# Patient Record
Sex: FEMALE | Race: WHITE | NOT HISPANIC OR LATINO | Employment: OTHER | URBAN - METROPOLITAN AREA
[De-identification: names, ages, dates, MRNs, and addresses within clinical notes are randomized per-mention and may not be internally consistent; named-entity substitution may affect disease eponyms.]

---

## 2017-01-04 ENCOUNTER — OFFICE VISIT (OUTPATIENT)
Dept: MEDICAL GROUP | Facility: MEDICAL CENTER | Age: 77
End: 2017-01-04
Payer: MEDICARE

## 2017-01-04 VITALS
TEMPERATURE: 98.7 F | WEIGHT: 147 LBS | HEIGHT: 60 IN | SYSTOLIC BLOOD PRESSURE: 126 MMHG | DIASTOLIC BLOOD PRESSURE: 80 MMHG | OXYGEN SATURATION: 93 % | RESPIRATION RATE: 16 BRPM | BODY MASS INDEX: 28.86 KG/M2 | HEART RATE: 91 BPM

## 2017-01-04 DIAGNOSIS — E03.9 ACQUIRED HYPOTHYROIDISM: ICD-10-CM

## 2017-01-04 DIAGNOSIS — R73.02 GLUCOSE INTOLERANCE (IMPAIRED GLUCOSE TOLERANCE): ICD-10-CM

## 2017-01-04 DIAGNOSIS — I10 ESSENTIAL HYPERTENSION: ICD-10-CM

## 2017-01-04 DIAGNOSIS — M25.562 CHRONIC PAIN OF LEFT KNEE: ICD-10-CM

## 2017-01-04 DIAGNOSIS — G89.29 CHRONIC PAIN OF LEFT KNEE: ICD-10-CM

## 2017-01-04 PROCEDURE — 99214 OFFICE O/P EST MOD 30 MIN: CPT | Performed by: INTERNAL MEDICINE

## 2017-01-04 RX ORDER — VERAPAMIL HYDROCHLORIDE 240 MG/1
TABLET, FILM COATED, EXTENDED RELEASE ORAL
COMMUNITY
Start: 2016-10-19 | End: 2017-09-08

## 2017-01-04 ASSESSMENT — PATIENT HEALTH QUESTIONNAIRE - PHQ9: CLINICAL INTERPRETATION OF PHQ2 SCORE: 0

## 2017-01-04 NOTE — MR AVS SNAPSHOT
Jill Diaz   2017 11:20 AM   Office Visit   MRN: [id number]    Department:  [address] [place] [address]   Dept Phone:  [phone]    Description:  Female : 1940   Provider:  Hector Neville M.D.           Reason for Visit     Follow-Up Labs      Allergies as of 2017     Allergen Noted Reactions    Demerol 2011   Vomiting    Nsaids 2011       swelling      You were diagnosed with     Acquired hypothyroidism   [3297580]       Essential hypertension   [1056571]       Glucose intolerance (impaired glucose tolerance)   [836778]       Chronic pain of left knee   [231126]         Vital Signs     Blood Pressure Pulse Temperature Respirations Height Weight    126/80 mmHg 91 37.1 °C (98.7 °F) 16 1.524 m (5') 66.679 kg (147 lb)    Body Mass Index Oxygen Saturation Smoking Status             28.71 kg/m2 93% Former Smoker         Basic Information     Date Of Birth Sex Race Ethnicity Preferred Language    1940 Female White Non- English      Your appointments     2017 11:00 AM   Established Patient with Hector Neville M.D.   [facility] [address]    [facility] [address]  [facility] [address] [phone]           You will be receiving a confirmation call a few days before your appointment from our automated call confirmation system.              Problem List              ICD-10-CM Priority Class Noted - Resolved    Essential hypertension I10 Medium  2011 - Present    Dyslipidemia E78.5 Medium  2011 - Present    Acquired hypothyroidism E03.9 Medium Chronic 2014 - Present    Glucose intolerance (impaired glucose tolerance) R73.02 Medium Chronic 2014 - Present    Chronic low back pain M54.5, G89.29 Medium Chronic 2014 - Present    Arrhythmia I49.9 High  10/16/2014 - Present    Chronic pain of left knee M25.562, G89.29   2016 - Present    Skin neoplasm D49.2   2016 - Present    Gastroesophageal reflux disease without  esophagitis K21.9   8/29/2016 - Present      Health Maintenance        Date Due Completion Dates    IMM DTaP/Tdap/Td Vaccine (1 - Tdap) 8/20/1959 ---    IMM ZOSTER VACCINE 8/20/2000 ---    MAMMOGRAM 10/14/2017 10/14/2016, 10/3/2016, 3/1/2014 (Prv Comp), 3/28/2013, 3/22/2012, 6/27/2011, 3/25/2011, 3/2/2011, 1/14/2009, 1/14/2009, 7/25/2007, 7/25/2007, 11/1/2005, 5/25/2004    Override on 3/1/2014: Previously completed    BONE DENSITY 10/17/2019 10/17/2014 (Prv Comp), 6/24/2011    Override on 10/17/2014: Previously completed    COLONOSCOPY 8/21/2023 8/21/2013            Current Immunizations     13-VALENT PCV PREVNAR 8/29/2015    Influenza TIV (IM) 9/1/2014    Influenza Vaccine Adult HD 9/4/2016, 10/6/2015 10:19 AM, 9/30/2014    Pneumococcal polysaccharide vaccine (PPSV-23) 1/10/2012      Below and/or attached are the medications your provider expects you to take. Review all of your home medications and newly ordered medications with your provider and/or pharmacist. Follow medication instructions as directed by your provider and/or pharmacist. Please keep your medication list with you and share with your provider. Update the information when medications are discontinued, doses are changed, or new medications (including over-the-counter products) are added; and carry medication information at all times in the event of emergency situations     Allergies:  DEMEROL - Vomiting     NSAIDS - (reactions not documented)               Medications  Valid as of: January 04, 2017 - 11:37 AM    Generic Name Brand Name Tablet Size Instructions for use    Aspirin (Tablet Delayed Response) ECOTRIN 81 MG Take 81 mg by mouth every day.        Cholecalciferol   Take 2,000 Units by mouth every day.        Diclofenac Sodium (Gel) Diclofenac Sodium 1 % Apply 1 Inch to skin as directed 4 times a day.        Furosemide (Tab) LASIX 20 MG TAKE ONE TABLET BY MOUTH ONCE DAILY        Levothyroxine Sodium (Tab) SYNTHROID 88 MCG Take 0.5 Tabs by mouth  Every morning on an empty stomach.        Losartan Potassium (Tab) COZAAR 25 MG TAKE ONE TABLET BY MOUTH ONCE DAILY IN THE MORNING        Metoprolol Succinate (TABLET SR 24 HR) TOPROL XL 50 MG TAKE ONE TABLET BY MOUTH ONCE DAILY        Omeprazole (CAPSULE DELAYED RELEASE) PRILOSEC 20 MG TAKE ONE CAPSULE BY MOUTH ONCE DAILY        Potassium Chloride (Tab CR) KLOR-CON 10 MEQ TAKE ONE TABLET BY MOUTH TWICE DAILY        Simvastatin (Tab) ZOCOR 40 MG TAKE ONE TABLET BY MOUTH ONCE DAILY        Verapamil HCl (CAPSULE SR 24 HR) Verapamil HCl  MG TAKE ONE CAPSULE BY MOUTH ONCE DAILY        Verapamil HCl (Tab CR) CALAN- MG         .                 Medicines prescribed today were sent to:     [facility] [address] [place] [address] (), [place] - [address]    [address] () [place] [address]    Phone: [phone] Fax: [phone]    Open 24 Hours?: No      Medication refill instructions:       If your prescription bottle indicates you have medication refills left, it is not necessary to call your provider’s office. Please contact your pharmacy and they will refill your medication.    If your prescription bottle indicates you do not have any refills left, you may request refills at any time through one of the following ways: The online [employer] system (except Urgent Care), by calling your provider’s office, or by asking your pharmacy to contact your provider’s office with a refill request. Medication refills are processed only during regular business hours and may not be available until the next business day. Your provider may request additional information or to have a follow-up visit with you prior to refilling your medication.   *Please Note: Medication refills are assigned a new Rx number when refilled electronically. Your pharmacy may indicate that no refills were authorized even though a new prescription for the same medication is available at the pharmacy. Please request the medicine by name with the  pharmacy before contacting your provider for a refill.        Your To Do List     Future Labs/Procedures Complete By Expires    HEMOGLOBIN A1C  As directed 1/5/2018         MyChart Access Code: Activation code not generated  Current MyChart Status: Active

## 2017-01-04 NOTE — PROGRESS NOTES
CC: Follow-up multiple issues    HPI:   Jill presents today with the following.    1. Acquired hypothyroidism  Thyroid recently checked found to be in a good range denying any hair or skin changes.    2. Essential hypertension  Blood pressures will maintain on current regimen denying any chest pain or shortness of breath no edema.    3. Glucose intolerance (impaired glucose tolerance)  Blood sugars again elevated fasting A1c of 5.7. Her  is diabetic so she's well aware of dietary measures. She is also started exercising since results obtained.    4. Chronic pain of left knee  Reports knee is doing significant better she did fill prescription from the pain specialist however she is not needing medication since her injection.       Patient Active Problem List    Diagnosis Date Noted   • Arrhythmia 10/16/2014     Priority: High   • Chronic low back pain 09/11/2014     Priority: Medium     Class: Chronic   • Acquired hypothyroidism 05/08/2014     Priority: Medium     Class: Chronic   • Glucose intolerance (impaired glucose tolerance) 05/08/2014     Priority: Medium     Class: Chronic   • Essential hypertension 07/05/2011     Priority: Medium   • Dyslipidemia 07/05/2011     Priority: Medium   • Gastroesophageal reflux disease without esophagitis 08/29/2016   • Skin neoplasm 06/27/2016   • Chronic pain of left knee 06/08/2016       Current Outpatient Prescriptions   Medication Sig Dispense Refill   • Diclofenac Sodium (VOLTAREN) 1 % Gel Apply 1 Inch to skin as directed 4 times a day. 5 Tube 3   • verapamil ER (CALAN-SR) 240 MG Tab CR      • potassium chloride ER (KLOR-CON) 10 MEQ tablet TAKE ONE TABLET BY MOUTH TWICE DAILY 60 Tab 6   • levothyroxine (SYNTHROID) 88 MCG Tab Take 0.5 Tabs by mouth Every morning on an empty stomach. 45 Tab 3   • Verapamil HCl  MG CAPSULE SR 24 HR TAKE ONE CAPSULE BY MOUTH ONCE DAILY 90 Cap 4   • omeprazole (PRILOSEC) 20 MG delayed-release capsule TAKE ONE CAPSULE BY MOUTH  ONCE DAILY 90 Cap 4   • simvastatin (ZOCOR) 40 MG Tab TAKE ONE TABLET BY MOUTH ONCE DAILY 90 Tab 3   • losartan (COZAAR) 25 MG Tab TAKE ONE TABLET BY MOUTH ONCE DAILY IN THE MORNING 90 Tab 3   • furosemide (LASIX) 20 MG Tab TAKE ONE TABLET BY MOUTH ONCE DAILY 90 Tab 4   • metoprolol SR (TOPROL XL) 50 MG TABLET SR 24 HR TAKE ONE TABLET BY MOUTH ONCE DAILY 90 Tab 3   • aspirin EC (ECOTRIN) 81 MG TBEC Take 81 mg by mouth every day.     • VITAMIN D, CHOLECALCIFEROL, PO Take 2,000 Units by mouth every day.       No current facility-administered medications for this visit.         Allergies as of 01/04/2017 - Martínez as Reviewed 01/04/2017   Allergen Reaction Noted   • Demerol Vomiting 11/14/2011   • Nsaids  07/05/2011        ROS: As per HPI.    /80 mmHg  Pulse 91  Temp(Src) 37.1 °C (98.7 °F)  Resp 16  Ht 1.524 m (5')  Wt 66.679 kg (147 lb)  BMI 28.71 kg/m2  SpO2 93%    Physical Exam:  Gen:         Alert and oriented, No apparent distress.  Neck:        No Lymphadenopathy or Bruits.  Lungs:     Clear to auscultation bilaterally  CV:          Regular rate and rhythm. No murmurs, rubs or gallops.               Ext:          No clubbing, cyanosis, edema.      Assessment and Plan.   76 y.o. female with the following issues.    1. Acquired hypothyroidism  Hypothyroidism.   Currently adequately replaced, recheck 6 months to one year.      2. Essential hypertension  Currently well controlled, Discuss diet, exercise and salt restriction.    3. Glucose intolerance (impaired glucose tolerance)  Discussed diet and exercise will check every 6 months.  - HEMOGLOBIN A1C; Future    4. Chronic pain of left knee  Placing on topical anti-inflammatory will see back if pain should return and worsen.  - Diclofenac Sodium (VOLTAREN) 1 % Gel; Apply 1 Inch to skin as directed 4 times a day.  Dispense: 5 Tube; Refill: 3

## 2017-03-12 ENCOUNTER — HOSPITAL ENCOUNTER (EMERGENCY)
Facility: MEDICAL CENTER | Age: 77
End: 2017-03-13
Attending: EMERGENCY MEDICINE
Payer: MEDICARE

## 2017-03-12 ENCOUNTER — APPOINTMENT (OUTPATIENT)
Dept: RADIOLOGY | Facility: MEDICAL CENTER | Age: 77
End: 2017-03-12
Attending: EMERGENCY MEDICINE
Payer: MEDICARE

## 2017-03-12 ENCOUNTER — APPOINTMENT (OUTPATIENT)
Dept: RADIOLOGY | Facility: MEDICAL CENTER | Age: 77
End: 2017-03-12
Payer: MEDICARE

## 2017-03-12 DIAGNOSIS — I95.89 OTHER SPECIFIED HYPOTENSION: ICD-10-CM

## 2017-03-12 DIAGNOSIS — R00.2 PALPITATIONS: ICD-10-CM

## 2017-03-12 LAB
ALBUMIN SERPL BCP-MCNC: 4.3 G/DL (ref 3.2–4.9)
ALBUMIN/GLOB SERPL: 1.5 G/DL
ALP SERPL-CCNC: 57 U/L (ref 30–99)
ALT SERPL-CCNC: 23 U/L (ref 2–50)
ANION GAP SERPL CALC-SCNC: 11 MMOL/L (ref 0–11.9)
APPEARANCE UR: ABNORMAL
APTT PPP: 29 SEC (ref 24.7–36)
AST SERPL-CCNC: 22 U/L (ref 12–45)
BACTERIA #/AREA URNS HPF: ABNORMAL /HPF
BASOPHILS # BLD AUTO: 0.1 % (ref 0–1.8)
BASOPHILS # BLD: 0.01 K/UL (ref 0–0.12)
BILIRUB SERPL-MCNC: 0.3 MG/DL (ref 0.1–1.5)
BILIRUB UR QL STRIP.AUTO: NEGATIVE
BNP SERPL-MCNC: 16 PG/ML (ref 0–100)
BUN SERPL-MCNC: 26 MG/DL (ref 8–22)
CALCIUM SERPL-MCNC: 9.4 MG/DL (ref 8.5–10.5)
CHLORIDE SERPL-SCNC: 105 MMOL/L (ref 96–112)
CO2 SERPL-SCNC: 24 MMOL/L (ref 20–33)
COLOR UR: ABNORMAL
CREAT SERPL-MCNC: 1.28 MG/DL (ref 0.5–1.4)
CULTURE IF INDICATED INDCX: YES UA CULTURE
EKG IMPRESSION: NORMAL
EOSINOPHIL # BLD AUTO: 0.07 K/UL (ref 0–0.51)
EOSINOPHIL NFR BLD: 1 % (ref 0–6.9)
EPI CELLS #/AREA URNS HPF: ABNORMAL /HPF
ERYTHROCYTE [DISTWIDTH] IN BLOOD BY AUTOMATED COUNT: 44.1 FL (ref 35.9–50)
GFR SERPL CREATININE-BSD FRML MDRD: 40 ML/MIN/1.73 M 2
GLOBULIN SER CALC-MCNC: 2.8 G/DL (ref 1.9–3.5)
GLUCOSE SERPL-MCNC: 159 MG/DL (ref 65–99)
GLUCOSE UR STRIP.AUTO-MCNC: NEGATIVE MG/DL
HCT VFR BLD AUTO: 44 % (ref 37–47)
HGB BLD-MCNC: 15.4 G/DL (ref 12–16)
IMM GRANULOCYTES # BLD AUTO: 0.04 K/UL (ref 0–0.11)
IMM GRANULOCYTES NFR BLD AUTO: 0.5 % (ref 0–0.9)
INR PPP: 0.94 (ref 0.87–1.13)
KETONES UR STRIP.AUTO-MCNC: NEGATIVE MG/DL
LEUKOCYTE ESTERASE UR QL STRIP.AUTO: ABNORMAL
LIPASE SERPL-CCNC: 44 U/L (ref 11–82)
LYMPHOCYTES # BLD AUTO: 2.41 K/UL (ref 1–4.8)
LYMPHOCYTES NFR BLD: 33.1 % (ref 22–41)
MCH RBC QN AUTO: 34.7 PG (ref 27–33)
MCHC RBC AUTO-ENTMCNC: 35 G/DL (ref 33.6–35)
MCV RBC AUTO: 99.1 FL (ref 81.4–97.8)
MICRO URNS: ABNORMAL
MONOCYTES # BLD AUTO: 0.52 K/UL (ref 0–0.85)
MONOCYTES NFR BLD AUTO: 7.1 % (ref 0–13.4)
NEUTROPHILS # BLD AUTO: 4.23 K/UL (ref 2–7.15)
NEUTROPHILS NFR BLD: 58.2 % (ref 44–72)
NITRITE UR QL STRIP.AUTO: NEGATIVE
NRBC # BLD AUTO: 0 K/UL
NRBC BLD AUTO-RTO: 0 /100 WBC
PH UR STRIP.AUTO: 6 [PH]
PLATELET # BLD AUTO: 258 K/UL (ref 164–446)
PMV BLD AUTO: 9.3 FL (ref 9–12.9)
POTASSIUM SERPL-SCNC: 3.4 MMOL/L (ref 3.6–5.5)
PROT SERPL-MCNC: 7.1 G/DL (ref 6–8.2)
PROT UR QL STRIP: NEGATIVE MG/DL
PROTHROMBIN TIME: 12.9 SEC (ref 12–14.6)
RBC # BLD AUTO: 4.44 M/UL (ref 4.2–5.4)
RBC UR QL AUTO: NEGATIVE
SODIUM SERPL-SCNC: 140 MMOL/L (ref 135–145)
SP GR UR STRIP.AUTO: >1.035
TROPONIN I SERPL-MCNC: <0.01 NG/ML (ref 0–0.04)
WBC # BLD AUTO: 7.3 K/UL (ref 4.8–10.8)
WBC #/AREA URNS HPF: ABNORMAL /HPF

## 2017-03-12 PROCEDURE — 74175 CTA ABDOMEN W/CONTRAST: CPT

## 2017-03-12 PROCEDURE — 87077 CULTURE AEROBIC IDENTIFY: CPT

## 2017-03-12 PROCEDURE — 99284 EMERGENCY DEPT VISIT MOD MDM: CPT

## 2017-03-12 PROCEDURE — 302128 INFUSION PUMP: Performed by: EMERGENCY MEDICINE

## 2017-03-12 PROCEDURE — 700117 HCHG RX CONTRAST REV CODE 255: Performed by: EMERGENCY MEDICINE

## 2017-03-12 PROCEDURE — 84484 ASSAY OF TROPONIN QUANT: CPT

## 2017-03-12 PROCEDURE — 87186 SC STD MICRODIL/AGAR DIL: CPT

## 2017-03-12 PROCEDURE — 85730 THROMBOPLASTIN TIME PARTIAL: CPT

## 2017-03-12 PROCEDURE — 87086 URINE CULTURE/COLONY COUNT: CPT

## 2017-03-12 PROCEDURE — 85610 PROTHROMBIN TIME: CPT

## 2017-03-12 PROCEDURE — 85025 COMPLETE CBC W/AUTO DIFF WBC: CPT

## 2017-03-12 PROCEDURE — 93005 ELECTROCARDIOGRAM TRACING: CPT

## 2017-03-12 PROCEDURE — 83880 ASSAY OF NATRIURETIC PEPTIDE: CPT

## 2017-03-12 PROCEDURE — 71010 DX-CHEST-LIMITED (1 VIEW): CPT

## 2017-03-12 PROCEDURE — 81001 URINALYSIS AUTO W/SCOPE: CPT

## 2017-03-12 PROCEDURE — 80053 COMPREHEN METABOLIC PANEL: CPT

## 2017-03-12 PROCEDURE — 83690 ASSAY OF LIPASE: CPT

## 2017-03-12 RX ADMIN — IOHEXOL 100 ML: 350 INJECTION, SOLUTION INTRAVENOUS at 23:23

## 2017-03-12 ASSESSMENT — PAIN SCALES - GENERAL: PAINLEVEL_OUTOF10: 2

## 2017-03-12 NOTE — ED AVS SNAPSHOT
3/13/2017          Jill Acevedo Emily  [address]  [place] [address]    Dear Jill:    [facility] wants to ensure your discharge home is safe and you or your loved ones have had all your questions answered regarding your care after you leave the hospital.    You may receive a telephone call within two days of your discharge.  This call is to make certain you understand your discharge instructions as well as ensure we provided you with the best care possible during your stay with us.     The call will only last approximately 3-5 minutes and will be done by a nurse.    Once again, we want to ensure your discharge home is safe and that you have a clear understanding of any next steps in your care.  If you have any questions or concerns, please do not hesitate to contact us, we are here for you.  Thank you for choosing [facility] for your healthcare needs.    Sincerely,    Mirza Velásquez    [facility]

## 2017-03-12 NOTE — ED AVS SNAPSHOT
Home Care Instructions                                                                                                                Jill Diaz   MRN: [id number]    Department:  [facility], Emergency Dept   Date of Visit:  3/12/2017            [facility] Emergency Dept    [address] [place] [address]    Phone:  [phone]      You were seen by     Anu Hutchins M.D.      Your Diagnosis Was     Other specified hypotension     I95.89       These are the medications you received during your hospitalization from 03/12/2017 2030 to 03/13/2017 0146     Date/Time Order Dose Route Action    03/12/2017 2323 iohexol (OMNIPAQUE) 350 mg/mL 100 mL Intravenous Given      Follow-up Information     1. Follow up with Tereso Hearn M.D.. Call today.    Specialty:  Cardiology    Why:  For follow up and to schedule Holter monitor.    Contact information    [address] [address] [phone]          2. Go to [facility], Emergency Dept.    Specialty:  Emergency Medicine    Why:  If symptoms worsen    Contact information    [address] [place] [address] [phone]      Medication Information     Review all of your home medications and newly ordered medications with your primary doctor and/or pharmacist as soon as possible. Follow medication instructions as directed by your doctor and/or pharmacist.     Please keep your complete medication list with you and share with your physician. Update the information when medications are discontinued, doses are changed, or new medications (including over-the-counter products) are added; and carry medication information at all times in the event of emergency situations.               Medication List      ASK your doctor about these medications        Instructions    Morning Afternoon Evening Bedtime    aspirin EC 81 MG Tbec   Commonly known as:  ECOTRIN        Take 81 mg  by mouth every day.   Dose:  81 mg                        Diclofenac Sodium 1 % Gel   Commonly known as:  VOLTAREN        Apply 1 Inch to skin as directed 4 times a day.   Dose:  1 Inch                        furosemide 20 MG Tabs   Commonly known as:  LASIX        TAKE ONE TABLET BY MOUTH ONCE DAILY                        levothyroxine 88 MCG Tabs   Commonly known as:  SYNTHROID        Take 0.5 Tabs by mouth Every morning on an empty stomach.   Dose:  44 mcg                        losartan 25 MG Tabs   Commonly known as:  COZAAR        TAKE ONE TABLET BY MOUTH ONCE DAILY IN THE MORNING                        metoprolol SR 50 MG Tb24   Commonly known as:  TOPROL XL        TAKE ONE TABLET BY MOUTH ONCE DAILY                        omeprazole 20 MG delayed-release capsule   Commonly known as:  PRILOSEC        TAKE ONE CAPSULE BY MOUTH ONCE DAILY                        potassium chloride ER 10 MEQ tablet   Commonly known as:  KLOR-CON        TAKE ONE TABLET BY MOUTH TWICE DAILY                        simvastatin 40 MG Tabs   Commonly known as:  ZOCOR        TAKE ONE TABLET BY MOUTH ONCE DAILY                        * Verapamil HCl  MG Cp24        TAKE ONE CAPSULE BY MOUTH ONCE DAILY                        * verapamil  MG Tbcr   Commonly known as:  CALAN-SR                             VITAMIN D (CHOLECALCIFEROL) PO        Take 2,000 Units by mouth every day.   Dose:  2000 Units                        * Notice:  This list has 2 medication(s) that are the same as other medications prescribed for you. Read the directions carefully, and ask your doctor or other care provider to review them with you.            Procedures and tests performed during your visit     APTT    BTYPE NATRIURETIC PEPTIDE    CARDIAC MONITORING    CBC WITH DIFFERENTIAL    COMP METABOLIC PANEL    CONSENT FOR CONTRAST INJECTION    CT-CTA COMPLETE THORACOABDOMINAL AORTA    DX-CHEST-LIMITED (1 VIEW)    EKG (ER)    EKG (NOW)    ESTIMATED GFR     INFUSION PUMP    LIPASE    O2 Protocol    PROTHROMBIN TIME    SALINE LOCK    TROPONIN    URINALYSIS,CULTURE IF INDICATED    URINE CULTURE(NEW)    URINE MICROSCOPIC (W/UA)        Discharge Instructions       Please contact cardiology clinic in the morning for Holter monitor. Return to the emergency department if you develop any new or worsening symptoms, or if your lightheadedness, low blood pressure or palpitations return.      Cardiac Event Monitoring  A cardiac event monitor is a small recording device used to help detect abnormal heart rhythms (arrhythmias). The monitor is used to record heart rhythm when noticeable symptoms such as the following occur:  · Fast heartbeats (palpitations), such as heart racing or fluttering.  · Dizziness.  · Fainting or light-headedness.  · Unexplained weakness.  The monitor is wired to two electrodes placed on your chest. Electrodes are flat, sticky disks that attach to your skin. The monitor can be worn for up to 30 days. You will wear the monitor at all times, except when bathing.   HOW TO USE YOUR CARDIAC EVENT MONITOR  A technician will prepare your chest for the electrode placement. The technician will show you how to place the electrodes, how to work the monitor, and how to replace the batteries. Take time to practice using the monitor before you leave the office. Make sure you understand how to send the information from the monitor to your health care provider. This requires a telephone with a landline, not a cell phone. You need to:  · Wear your monitor at all times, except when you are in water:  ¨ Do not get the monitor wet.  ¨ Take the monitor off when bathing. Do not swim or use a hot tub with it on.  · Keep your skin clean. Do not put body lotion or moisturizer on your chest.  · Change the electrodes daily or any time they stop sticking to your skin. You might need to use tape to keep them on.  · It is possible that your skin under the electrodes could become  irritated. To keep this from happening, try to put the electrodes in slightly different places on your chest. However, they must remain in the area under your left breast and in the upper right section of your chest.  · Make sure the monitor is safely clipped to your clothing or in a location close to your body that your health care provider recommends.  · Press the button to record when you feel symptoms of heart trouble, such as dizziness, weakness, light-headedness, palpitations, thumping, shortness of breath, unexplained weakness, or a fluttering or racing heart. The monitor is always on and records what happened slightly before you pressed the button, so do not worry about being too late to get good information.  · Keep a diary of your activities, such as walking, doing chores, and taking medicine. It is especially important to note what you were doing when you pushed the button to record your symptoms. This will help your health care provider determine what might be contributing to your symptoms. The information stored in your monitor will be reviewed by your health care provider alongside your diary entries.  · Send the recorded information as recommended by your health care provider. It is important to understand that it will take some time for your health care provider to process the results.  · Change the batteries as recommended by your health care provider.  SEEK IMMEDIATE MEDICAL CARE IF:   · You have chest pain.  · You have extreme difficulty breathing or shortness of breath.  · You develop a very fast heartbeat that persists.  · You develop dizziness that does not go away.  · You faint or constantly feel you are about to faint.     This information is not intended to replace advice given to you by your health care provider. Make sure you discuss any questions you have with your health care provider.     Document Released: 09/26/2009 Document Revised: 01/08/2016 Document Reviewed: 06/16/2014  [employer]  Interactive Patient Education ©2016 [employer]            Patient Information     Patient Information    Following emergency treatment: all patient requiring follow-up care must return either to a private physician or a clinic if your condition worsens before you are able to obtain further medical attention, please return to the emergency room.     Billing Information    At [facility], we work to make the billing process streamlined for our patients.  Our Representatives are here to answer any questions you may have regarding your hospital bill.  If you have insurance coverage and have supplied your insurance information to us, we will submit a claim to your insurer on your behalf.  Should you have any questions regarding your bill, we can be reached online or by phone as follows:  Online: You are able pay your bills online or live chat with our representatives about any billing questions you may have. We are here to help Monday - Friday from 8:00am to 7:30pm and 9:00am - 12:00pm on Saturdays.  Please visit [email or web address]  for more information.   Phone:  [phone] or [phone]    Please note that your emergency physician, surgeon, pathologist, radiologist, anesthesiologist, and other specialists are not employed by [facility] and will therefore bill separately for their services.  Please contact them directly for any questions concerning their bills at the numbers below:     Emergency Physician Services:  [phone]  [facility]:  [phone]  [facility]:  [phone]  [facility]:  [phone]    1. Your final bill may vary from the amount quoted upon discharge if all procedures are not complete at that time, or if your doctor has additional procedures of which we are not aware. You will receive an additional bill if you return to the Emergency Department at [facility] for suture removal regardless of the  facility of which the sutures were placed.     2. Please arrange for settlement of this account at the emergency registration.    3. All self-pay accounts are due in full at the time of treatment.  If you are unable to meet this obligation then payment is expected within 4-5 days.     4. If you have had radiology studies (CT, X-ray, Ultrasound, MRI), you have received a preliminary result during your emergency department visit. Please contact the radiology department [phone] to receive a copy of your final result. Please discuss the Final result with your primary physician or with the follow up physician provided.     Crisis Hotline:  [place] Crisis Hotline:  8-296-YIFKAIN or 1-524.810.1520  [place] Crisis Hotline:    1-789.788.4311 or 783-507-5250         ED Discharge Follow Up Questions    1. In order to provide you with very good care, we would like to follow up with a phone call in the next few days.  May we have your permission to contact you?     YES /  NO    2. What is the best phone number to call you? (       )_____-__________    3. What is the best time to call you?      Morning  /  Afternoon  /  Evening                   Patient Signature:  ____________________________________________________________    Date:  ____________________________________________________________      Your appointments     Jul 18, 2017 11:00 AM   Established Patient with Hector Neville M.D.   [facility] [address]     [address]  [facility] [address]  [place] [address]   [phone]           You will be receiving a confirmation call a few days before your appointment from our automated call confirmation system.

## 2017-03-12 NOTE — ED AVS SNAPSHOT
[employer] Access Code: Activation code not generated  Current [employer] Status: Active    [email or web address]  A secure, online tool to manage your health information     [employer]® is a secure, online tool that connects you to your personalized health information from the privacy of your home -- day or night - making it very easy for you to manage your healthcare. Once the activation process is completed, you can even access your medical information using the [employer] catherine, which is available for free in the Apple Catherine store or Google Play store.     [employer] provides the following levels of access (as shown below):   My Chart Features   [facility] Primary Care Doctor [facility]  Specialists [facility]  Urgent  Care Non-[facility]  Primary Care  Doctor   Email your healthcare team securely and privately 24/7 X X X X   Manage appointments: schedule your next appointment; view details of past/upcoming appointments X      Request prescription refills. X      View recent personal medical records, including lab and immunizations X X X X   View health record, including health history, allergies, medications X X X X   Read reports about your outpatient visits, procedures, consult and ER notes X X X X   See your discharge summary, which is a recap of your hospital and/or ER visit that includes your diagnosis, lab results, and care plan. X X       How to register for [employer]:  1. Go to  [email or web address].  2. Click on the Sign Up Now box, which takes you to the New Member Sign Up page. You will need to provide the following information:  a. Enter your [employer] Access Code exactly as it appears at the top of this page. (You will not need to use this code after you’ve completed the sign-up process. If you do not sign up before the expiration date, you must request a new code.)   b. Enter your date of birth.   c. Enter your home email address.   d. Click Submit, and follow the next screen’s instructions.  3. Create a [employer] ID. This will  be your [employer] login ID and cannot be changed, so think of one that is secure and easy to remember.  4. Create a [employer] password. You can change your password at any time.  5. Enter your Password Reset Question and Answer. This can be used at a later time if you forget your password.   6. Enter your e-mail address. This allows you to receive e-mail notifications when new information is available in [employer].  7. Click Sign Up. You can now view your health information.    For assistance activating your [employer] account, call [phone]

## 2017-03-13 VITALS
TEMPERATURE: 97.9 F | SYSTOLIC BLOOD PRESSURE: 145 MMHG | DIASTOLIC BLOOD PRESSURE: 88 MMHG | RESPIRATION RATE: 20 BRPM | HEART RATE: 100 BPM | WEIGHT: 147.71 LBS | BODY MASS INDEX: 29 KG/M2 | OXYGEN SATURATION: 95 % | HEIGHT: 60 IN

## 2017-03-13 NOTE — DISCHARGE INSTRUCTIONS
Please contact cardiology clinic in the morning for Holter monitor. Return to the emergency department if you develop any new or worsening symptoms, or if your lightheadedness, low blood pressure or palpitations return.      Cardiac Event Monitoring  A cardiac event monitor is a small recording device used to help detect abnormal heart rhythms (arrhythmias). The monitor is used to record heart rhythm when noticeable symptoms such as the following occur:  · Fast heartbeats (palpitations), such as heart racing or fluttering.  · Dizziness.  · Fainting or light-headedness.  · Unexplained weakness.  The monitor is wired to two electrodes placed on your chest. Electrodes are flat, sticky disks that attach to your skin. The monitor can be worn for up to 30 days. You will wear the monitor at all times, except when bathing.   HOW TO USE YOUR CARDIAC EVENT MONITOR  A technician will prepare your chest for the electrode placement. The technician will show you how to place the electrodes, how to work the monitor, and how to replace the batteries. Take time to practice using the monitor before you leave the office. Make sure you understand how to send the information from the monitor to your health care provider. This requires a telephone with a landline, not a cell phone. You need to:  · Wear your monitor at all times, except when you are in water:  ¨ Do not get the monitor wet.  ¨ Take the monitor off when bathing. Do not swim or use a hot tub with it on.  · Keep your skin clean. Do not put body lotion or moisturizer on your chest.  · Change the electrodes daily or any time they stop sticking to your skin. You might need to use tape to keep them on.  · It is possible that your skin under the electrodes could become irritated. To keep this from happening, try to put the electrodes in slightly different places on your chest. However, they must remain in the area under your left breast and in the upper right section of your  chest.  · Make sure the monitor is safely clipped to your clothing or in a location close to your body that your health care provider recommends.  · Press the button to record when you feel symptoms of heart trouble, such as dizziness, weakness, light-headedness, palpitations, thumping, shortness of breath, unexplained weakness, or a fluttering or racing heart. The monitor is always on and records what happened slightly before you pressed the button, so do not worry about being too late to get good information.  · Keep a diary of your activities, such as walking, doing chores, and taking medicine. It is especially important to note what you were doing when you pushed the button to record your symptoms. This will help your health care provider determine what might be contributing to your symptoms. The information stored in your monitor will be reviewed by your health care provider alongside your diary entries.  · Send the recorded information as recommended by your health care provider. It is important to understand that it will take some time for your health care provider to process the results.  · Change the batteries as recommended by your health care provider.  SEEK IMMEDIATE MEDICAL CARE IF:   · You have chest pain.  · You have extreme difficulty breathing or shortness of breath.  · You develop a very fast heartbeat that persists.  · You develop dizziness that does not go away.  · You faint or constantly feel you are about to faint.     This information is not intended to replace advice given to you by your health care provider. Make sure you discuss any questions you have with your health care provider.     Document Released: 09/26/2009 Document Revised: 01/08/2016 Document Reviewed: 06/16/2014  [employer] Interactive Patient Education ©2016 [employer]

## 2017-03-13 NOTE — ED NOTES
Patient to follow up with PCP/cardiologist in the morning. Patient verbalizes understanding of discharge instructions and home medications. Patient ambulatory upon discharge.

## 2017-03-13 NOTE — ED PROVIDER NOTES
ED Provider Note    Scribed for Anu Hutchins M.D. by Rito Díaz. 3/12/2017, 9:48 PM.    Primary care provider: Hector Neville M.D.  Means of arrival: Walk In  History obtained from: Patient  History limited by: None    CHIEF COMPLAINT  Chief Complaint   Patient presents with   • Dizziness   • Hypotension     at home x2 days 80/40   • Rapid Heart Beat     HPI  Jill Diaz is a 76 y.o. female who presents to the Emergency Department due to hypotension. Three days ago, patient took her blood pressure to be 81/40. Patient states she discontinued her antihypertensive medications at this time. She has not taken her blood pressure medication in the last three days secondary to her hypotension. Today, patient has had lightheadedness and heart palpitations associated with her hypotension. Symptoms have been persistent for the last 3 days. She states she contacted a relative who is a paramedic today who instructed her to present to the emergency department. She then contacted her primary care physician's office who also recommended presentation to the emergency department. The patient experienced a transient episode of chest discomfort today that has since resolved. Chest discomfort described as pressure, localized to the substernal chest however she did note radiation to the back that is new and different from any previous episodes of tachycardia that she is having the past. She rates her worst chest pain today as being 2/10 in severity. Patient has a previous history of super ventricular tachycardia. She currently takes Cozaar, Verapamil, Toprol for her SVT.   Patient denies any fever, chills, dysuria, nausea, vomiting, shortness of breath.     REVIEW OF SYSTEMS  Pertinent positives include hypotension, lightheadedness, heart palpitations. Pertinent negatives include no fever, chills, dysuria, nausea, vomiting, shortness of breath.  All other systems reviewed and negative.    C.     PAST MEDICAL HISTORY   has a  past medical history of Hypertension (7/5/2011); Hyperlipidemia (7/5/2011); Heart burn; Indigestion; Hiatus hernia syndrome; Urinary bladder disorder; Pneumonia; Renal disorder (2009); Unspecified urinary incontinence; Cancer (CMS-HCC); Arrhythmia; Dental disorder; Psychiatric problem; Kidney stones; and Chronic pain of left knee (6/8/2016).    SURGICAL HISTORY   has past surgical history that includes gyn surgery; colposacropexy robotic (11/14/2011); cystoscopy (11/14/2011); cholecystectomy (1971); hysterectomy, total abdominal (1989); and abdominal hysterectomy total.    SOCIAL HISTORY  Social History   Substance Use Topics   • Smoking status: Former Smoker -- 1.00 packs/day for 12 years     Quit date: 01/01/1975   • Smokeless tobacco: Never Used   • Alcohol Use: No      History   Drug Use No     FAMILY HISTORY  Family History   Problem Relation Age of Onset   • Lung Disease Father    • Cancer Mother    • Arrythmia Sister      CURRENT MEDICATIONS  Home Medications     Reviewed by Leonel Amaro R.N. (Registered Nurse) on 03/12/17 at 2143  Med List Status: <None>    Medication Last Dose Status    aspirin EC (ECOTRIN) 81 MG TBEC  Active    Diclofenac Sodium (VOLTAREN) 1 % Gel  Active    furosemide (LASIX) 20 MG Tab  Active    levothyroxine (SYNTHROID) 88 MCG Tab  Active    losartan (COZAAR) 25 MG Tab  Active    metoprolol SR (TOPROL XL) 50 MG TABLET SR 24 HR  Active    omeprazole (PRILOSEC) 20 MG delayed-release capsule  Active    potassium chloride ER (KLOR-CON) 10 MEQ tablet  Active    simvastatin (ZOCOR) 40 MG Tab  Active    verapamil ER (CALAN-SR) 240 MG Tab CR  Active    Verapamil HCl  MG CAPSULE SR 24 HR  Active    VITAMIN D, CHOLECALCIFEROL, PO  Active              ALLERGIES  Allergies   Allergen Reactions   • Demerol Vomiting   • Nsaids      swelling     PHYSICAL EXAM  Vital Signs: /94 mmHg  Pulse 121  Temp(Src) 36.6 °C (97.9 °F)  Resp 19  Ht 1.524 m (5')  Wt 67 kg (147 lb 11.3 oz)  BMI  28.85 kg/m2  SpO2 95%  Constitutional: Alert, no acute distress  HENT: Normocephalic, atraumatic, moist mucus membranes  Eyes: Pupils equal and reactive, normal conjunctiva, non-icteric  Neck: Supple, normal range of motion, no stridor  Cardiovascular: Regular rhythm, Normal peripheral perfusion, no cyanosis, Normal cardiac auscultation  Pulmonary: No respiratory distress, normal work of breathing, no accessory muscle usage, Clear to auscultation bilaterally  Abdomen: Soft, non tender, no peritoneal signs, bowel sounds are present.   Skin: Warm, dry, no rashes or lesions  Back: No pain with active range of motion  Musculoskeletal: Normal range of motion in all extremities, no swelling or deformity noted  Neurologic: Alert, oriented, normal motor function, no speech deficits  Psychiatric: Normal and appropriate mood and affect    DIAGNOSTIC STUDIES/PROCEDURES:    LABS  Labs Reviewed   CBC WITH DIFFERENTIAL - Abnormal; Notable for the following:     MCV 99.1 (*)     MCH 34.7 (*)     All other components within normal limits    Narrative:     Indicate which anticoagulants the patient is on:->UNKNOWN   COMP METABOLIC PANEL - Abnormal; Notable for the following:     Potassium 3.4 (*)     Glucose 159 (*)     Bun 26 (*)     All other components within normal limits    Narrative:     Indicate which anticoagulants the patient is on:->UNKNOWN   ESTIMATED GFR - Abnormal; Notable for the following:     GFR If  49 (*)     GFR If Non  40 (*)     All other components within normal limits    Narrative:     Indicate which anticoagulants the patient is on:->UNKNOWN   URINALYSIS,CULTURE IF INDICATED - Abnormal; Notable for the following:     Character Sl Cloudy (*)     Specific Gravity >1.035 (*)     Leukocyte Esterase Large (*)     All other components within normal limits   URINE MICROSCOPIC (W/UA) - Abnormal; Notable for the following:     -150 (*)     Bacteria Many (*)     All other  components within normal limits   TROPONIN    Narrative:     Indicate which anticoagulants the patient is on:->UNKNOWN   BTYPE NATRIURETIC PEPTIDE    Narrative:     Indicate which anticoagulants the patient is on:->UNKNOWN   PROTHROMBIN TIME    Narrative:     Indicate which anticoagulants the patient is on:->UNKNOWN   APTT    Narrative:     Indicate which anticoagulants the patient is on:->UNKNOWN   LIPASE    Narrative:     Indicate which anticoagulants the patient is on:->UNKNOWN   URINE CULTURE(NEW)     All labs reviewed by me.    EKG  12 Lead EKG interpreted by me to show:  Weight 127, sinus tachycardia, ST depression in V2, V3, no ST elevation, no T-wave inversions    Radiology results revealed:   CT-CTA COMPLETE THORACOABDOMINAL AORTA   Final Result         1. No evidence of aortic dissection or aneurysm.      2. Tortuous mildly prominent proximal splenic artery with moderate calcified and noncalcified atherosclerotic plaque.      3. Patchy bibasilar opacities, likely atelectasis.      4. Descending and sigmoid diverticulosis.      5. Mild anterolisthesis of L4 on L5.      6. Duodenal diverticulum.      DX-CHEST-LIMITED (1 VIEW)   Final Result         1. No acute cardiopulmonary abnormalities are identified.           COURSE & MEDICAL DECISION MAKING  Pertinent Labs & Imaging studies reviewed. (See chart for details)    Differential diagnoses include but are not limited to: Cardiac arrhythmia, orthostatic hypotension, ACS, aortic dissection, aortic aneurysm, pneumonia, infectious etiology    9:48 PM - Patient seen and examined at bedside. Ordered CT CTA Aorta, Chest X Ray, urinalysis, GFR, troponin, BNP, CBC, CMP, prothrombin time, lipase, APTT to evaluate her symptoms.     Decision Making:  This is a 76 y.o. year old female who presents with palpitations and lightheadedness. She has a history of PAT and SVT, states she is followed by Dr. Hearn, cardiologist. She has had lightheadedness, low blood pressures at  home, and episodes of palpitations last 3 days. She presented today because a family member who is an EMT recommended emergency evaluation.    On physical exam she states her symptoms have completely resolved. She no longer has hypotension, blood pressure on arrival is 149/103. Heart rate on arrival was 136, with rest and without further intervention it improved to 91. She remained asymptomatic and chest pain-free throughout her stay in the emergency department. Most recent episode of chest pain was greater than 6 hours prior to arrival. She had been chest pain-free since that time.    On laboratory evaluation she does have a normal white blood count, no evidence of systemic infection. No electrolyte abnormalities. Troponin is undetectable, BNP is negative. Urinalysis with asymptomatic bacteriuria, will not treat at this time due to risk of C. Diff. As patient is asymptomatic will await culture results to determine this is not a contaminant.    CTA is negative for evidence of aortic dissection or aneurysm. No acute process, bibasilar opacities noted likely atelectasis. Diverticulosis without evidence of diverticulitis.    1:21 AM Case discussed with Dr. Hearn, cardiologist. At this time he does not believe the patient requires admission. Plan is for outpatient Holter monitor which he will set up tomorrow in clinic. He will also schedule a clinic follow-up to review results. Patient and family are in agreement with this plan.    Patient is discharged home in stable condition. She will continue to monitor her home blood pressure, and will return immediately if any of her symptoms recur, specifically chest pain, recurrent hypotension, lightheadedness or any new or worsening symptoms. HEART score of 3 indicating low risk of major adverse cardiac event.     Discharge home in stable condition    FINAL IMPRESSION  1. Other specified hypotension    2. Palpitations          I, Rito Díaz (Scribe), am scribing for, and in the  presence of, Anu Hutchins M.D..    Electronically signed by: Rito Díaz (Scribe), 3/12/2017    I, Anu Hutchins M.D. personally performed the services described in this documentation, as scribed by Rito Díaz in my presence, and it is both accurate and complete.    The note accurately reflects work and decisions made by me.  Anu Hutchins  3/13/2017  1:26 AM

## 2017-03-13 NOTE — ED NOTES
Patient to ED triage via wheelchair with complaints of dizziness, lightheadedness, low BP, elevated HR and chest discomfort. She states she first noticed dizziness at home 2 days ago and found her BP to be 80/40. She rested and did not take her BP medication that next day, but continued to have low BP and elevated HR. Continued to have lightheadedness. She is now experiencing chest discomfort.  in triage. EKG completed.     All DC discussed. Pt verbalized understanding of all DC instructions, prescriptions and follow up recommendations. Pt ambulated to lobby with upright and steady gait.

## 2017-03-15 LAB
BACTERIA UR CULT: ABNORMAL
BACTERIA UR CULT: ABNORMAL
SIGNIFICANT IND 70042: ABNORMAL
SITE SITE: ABNORMAL
SOURCE SOURCE: ABNORMAL

## 2017-03-20 NOTE — ED NOTES
ED Positive Culture Follow-up/Notification Note:    Date: 3/20/17     Patient seen in the ED on 3/12/2017 for hypotension - recently discontinued antihypertensive medications.   1. Other specified hypotension    2. Palpitations       Discharge Medication List as of 3/13/2017  1:46 AM          Allergies: Demerol and Nsaids     Final cultures:   Results     Procedure Component Value Units Date/Time    URINE CULTURE(NEW) [[id number]]  (Abnormal) Collected:  03/12/17 2345    Order Status:  Completed Specimen Information:  Urine Updated:  03/15/17 0841     Significant Indicator POS (POS)      Source UR      Site --      Urine Culture Mixed skin ayla >100,000 cfu/mL (A)      Urine Culture -- (A)      Result:        Escherichia coli  >100,000 cfu/mL  Component of mix skin ayla            Plan:   Per MD - UA likely represents asymptomatic bacteriuria.  Patient denies dysuria and other urinary symptoms.   No need to treat at this time.     Jammie Rolle

## 2017-04-18 ENCOUNTER — OFFICE VISIT (OUTPATIENT)
Dept: MEDICAL GROUP | Facility: MEDICAL CENTER | Age: 77
End: 2017-04-18
Payer: MEDICARE

## 2017-04-18 VITALS
OXYGEN SATURATION: 95 % | HEART RATE: 115 BPM | DIASTOLIC BLOOD PRESSURE: 88 MMHG | BODY MASS INDEX: 28.65 KG/M2 | WEIGHT: 145.94 LBS | TEMPERATURE: 98.6 F | SYSTOLIC BLOOD PRESSURE: 128 MMHG | RESPIRATION RATE: 14 BRPM | HEIGHT: 60 IN

## 2017-04-18 DIAGNOSIS — L98.9 SKIN LESIONS: ICD-10-CM

## 2017-04-18 PROCEDURE — G8419 CALC BMI OUT NRM PARAM NOF/U: HCPCS | Performed by: FAMILY MEDICINE

## 2017-04-18 PROCEDURE — 1101F PT FALLS ASSESS-DOCD LE1/YR: CPT | Performed by: FAMILY MEDICINE

## 2017-04-18 PROCEDURE — G8432 DEP SCR NOT DOC, RNG: HCPCS | Performed by: FAMILY MEDICINE

## 2017-04-18 PROCEDURE — 99214 OFFICE O/P EST MOD 30 MIN: CPT | Performed by: FAMILY MEDICINE

## 2017-04-18 PROCEDURE — 4040F PNEUMOC VAC/ADMIN/RCVD: CPT | Performed by: FAMILY MEDICINE

## 2017-04-18 PROCEDURE — 1036F TOBACCO NON-USER: CPT | Performed by: FAMILY MEDICINE

## 2017-04-18 NOTE — MR AVS SNAPSHOT
Jill Diaz   2017 10:40 AM   Office Visit   MRN: [id number]    Department:  [address] [place] [address]   Dept Phone:  [phone]    Description:  Female : 1940   Provider:  Mikki Smith M.D.           Reason for Visit     Nevus Face/Right cheek, and left arm. would like to have checked      Allergies as of 2017     Allergen Noted Reactions    Demerol 2011   Vomiting    Nsaids 2011       swelling      You were diagnosed with     Skin lesions   [0320216]         Vital Signs     Blood Pressure Pulse Temperature Respirations Height Weight    128/88 mmHg 115 37 °C (98.6 °F) 14 1.524 m (5') 66.2 kg (145 lb 15.1 oz)    Body Mass Index Oxygen Saturation Smoking Status             28.50 kg/m2 95% Former Smoker         Basic Information     Date Of Birth Sex Race Ethnicity Preferred Language    1940 Female White Non- English      Your appointments     2017 11:00 AM   Established Patient with Hector Neville M.D.   [facility] [address]    [address] [facility] [address]  [facility] [address]   [phone]           You will be receiving a confirmation call a few days before your appointment from our automated call confirmation system.              Problem List              ICD-10-CM Priority Class Noted - Resolved    Essential hypertension I10 Medium  2011 - Present    Dyslipidemia E78.5 Medium  2011 - Present    Acquired hypothyroidism E03.9 Medium Chronic 2014 - Present    Glucose intolerance (impaired glucose tolerance) R73.02 Medium Chronic 2014 - Present    Chronic low back pain M54.5, G89.29 Medium Chronic 2014 - Present    Arrhythmia I49.9 High  10/16/2014 - Present    Chronic pain of left knee M25.562, G89.29   2016 - Present    Skin neoplasm D49.2   2016 - Present    Gastroesophageal reflux disease without esophagitis K21.9   2016 - Present      Health Maintenance        Date Due Completion  Dates    IMM DTaP/Tdap/Td Vaccine (1 - Tdap) 8/20/1959 ---    IMM ZOSTER VACCINE 8/20/2000 ---    MAMMOGRAM 10/14/2017 10/14/2016, 10/3/2016, 3/1/2014 (Prv Comp), 3/28/2013, 3/22/2012, 6/27/2011, 3/2/2011, 1/14/2009, 1/14/2009, 7/25/2007, 7/25/2007, 11/1/2005, 5/25/2004    Override on 3/1/2014: Previously completed    BONE DENSITY 10/17/2019 10/17/2014 (Prv Comp), 6/24/2011    Override on 10/17/2014: Previously completed    COLONOSCOPY 8/21/2023 8/21/2013            Current Immunizations     13-VALENT PCV PREVNAR 8/29/2015    Influenza TIV (IM) 9/1/2014    Influenza Vaccine Adult HD 9/4/2016, 10/6/2015 10:19 AM, 9/30/2014    Pneumococcal polysaccharide vaccine (PPSV-23) 1/10/2012      Below and/or attached are the medications your provider expects you to take. Review all of your home medications and newly ordered medications with your provider and/or pharmacist. Follow medication instructions as directed by your provider and/or pharmacist. Please keep your medication list with you and share with your provider. Update the information when medications are discontinued, doses are changed, or new medications (including over-the-counter products) are added; and carry medication information at all times in the event of emergency situations     Allergies:  DEMEROL - Vomiting     NSAIDS - (reactions not documented)               Medications  Valid as of: April 18, 2017 - 11:51 AM    Generic Name Brand Name Tablet Size Instructions for use    Aspirin (Tablet Delayed Response) ECOTRIN 81 MG Take 81 mg by mouth every day.        Cholecalciferol   Take 2,000 Units by mouth every day.        Diclofenac Sodium (Gel) Diclofenac Sodium 1 % Apply 1 Inch to skin as directed 4 times a day.        Furosemide (Tab) LASIX 20 MG TAKE ONE TABLET BY MOUTH ONCE DAILY        Levothyroxine Sodium (Tab) SYNTHROID 88 MCG Take 0.5 Tabs by mouth Every morning on an empty stomach.        Losartan Potassium (Tab) COZAAR 25 MG TAKE ONE TABLET BY  MOUTH ONCE DAILY IN THE MORNING        Metoprolol Succinate (TABLET SR 24 HR) TOPROL XL 50 MG TAKE ONE TABLET BY MOUTH ONCE DAILY        Omeprazole (CAPSULE DELAYED RELEASE) PRILOSEC 20 MG TAKE ONE CAPSULE BY MOUTH ONCE DAILY        Potassium Chloride (Tab CR) KLOR-CON 10 MEQ TAKE ONE TABLET BY MOUTH TWICE DAILY        Simvastatin (Tab) ZOCOR 40 MG TAKE ONE TABLET BY MOUTH ONCE DAILY        Verapamil HCl (CAPSULE SR 24 HR) Verapamil HCl  MG TAKE ONE CAPSULE BY MOUTH ONCE DAILY        Verapamil HCl (Tab CR) CALAN- MG         .                 Medicines prescribed today were sent to:     [facility] [address] [place] [address] (), [place] - [address]    [address] () [place] [address]    Phone: [phone] Fax: [phone]    Open 24 Hours?: No      Medication refill instructions:       If your prescription bottle indicates you have medication refills left, it is not necessary to call your provider’s office. Please contact your pharmacy and they will refill your medication.    If your prescription bottle indicates you do not have any refills left, you may request refills at any time through one of the following ways: The online [employer] system (except Urgent Care), by calling your provider’s office, or by asking your pharmacy to contact your provider’s office with a refill request. Medication refills are processed only during regular business hours and may not be available until the next business day. Your provider may request additional information or to have a follow-up visit with you prior to refilling your medication.   *Please Note: Medication refills are assigned a new Rx number when refilled electronically. Your pharmacy may indicate that no refills were authorized even though a new prescription for the same medication is available at the pharmacy. Please request the medicine by name with the pharmacy before contacting your provider for a refill.        Referral     A referral request has  been sent to our patient care coordination department. Please allow 3-5 business days for us to process this request and contact you either by phone or mail. If you do not hear from us by the 5th business day, please call us at [phone].           [employer] Access Code: Activation code not generated  Current [employer] Status: Active

## 2017-04-19 ENCOUNTER — HOSPITAL ENCOUNTER (OUTPATIENT)
Dept: LAB | Facility: MEDICAL CENTER | Age: 77
End: 2017-04-19
Attending: INTERNAL MEDICINE
Payer: MEDICARE

## 2017-04-19 LAB
ALBUMIN SERPL BCP-MCNC: 4.6 G/DL (ref 3.2–4.9)
ALBUMIN/GLOB SERPL: 1.4 G/DL
ALP SERPL-CCNC: 50 U/L (ref 30–99)
ALT SERPL-CCNC: 17 U/L (ref 2–50)
ANION GAP SERPL CALC-SCNC: 12 MMOL/L (ref 0–11.9)
AST SERPL-CCNC: 19 U/L (ref 12–45)
BILIRUB SERPL-MCNC: 0.5 MG/DL (ref 0.1–1.5)
BUN SERPL-MCNC: 28 MG/DL (ref 8–22)
CALCIUM SERPL-MCNC: 10.5 MG/DL (ref 8.5–10.5)
CHLORIDE SERPL-SCNC: 103 MMOL/L (ref 96–112)
CHOLEST SERPL-MCNC: 159 MG/DL (ref 100–199)
CK SERPL-CCNC: 56 U/L (ref 0–154)
CO2 SERPL-SCNC: 24 MMOL/L (ref 20–33)
CREAT SERPL-MCNC: 1.65 MG/DL (ref 0.5–1.4)
GFR SERPL CREATININE-BSD FRML MDRD: 30 ML/MIN/1.73 M 2
GLOBULIN SER CALC-MCNC: 3.2 G/DL (ref 1.9–3.5)
GLUCOSE SERPL-MCNC: 102 MG/DL (ref 65–99)
HDLC SERPL-MCNC: 49 MG/DL
LDLC SERPL CALC-MCNC: 79 MG/DL
POTASSIUM SERPL-SCNC: 4.4 MMOL/L (ref 3.6–5.5)
PROT SERPL-MCNC: 7.8 G/DL (ref 6–8.2)
SODIUM SERPL-SCNC: 139 MMOL/L (ref 135–145)
TRIGL SERPL-MCNC: 155 MG/DL (ref 0–149)
TSH SERPL DL<=0.005 MIU/L-ACNC: 1.66 UIU/ML (ref 0.3–3.7)

## 2017-04-19 PROCEDURE — 80061 LIPID PANEL: CPT

## 2017-04-19 PROCEDURE — 84443 ASSAY THYROID STIM HORMONE: CPT

## 2017-04-19 PROCEDURE — 80053 COMPREHEN METABOLIC PANEL: CPT

## 2017-04-19 PROCEDURE — 36415 COLL VENOUS BLD VENIPUNCTURE: CPT

## 2017-04-19 PROCEDURE — 82550 ASSAY OF CK (CPK): CPT

## 2017-04-19 NOTE — PROGRESS NOTES
CC: Skin lesions on the face, and forearm.    HPI:   Jill presents today because she has been having skin lesions on :     - The right side of her face, has been there for a while ( more than a year), lately it has been getting more crusty, and sometimes bleeds when patient tries to remove the crust. Patient denies any pain on it.   -  Left forearm,brown lesion,  has been there for more than a year as well, has getting more issue, and it it increases in size, but no bleeding, or pain.      Patient Active Problem List    Diagnosis Date Noted   • Arrhythmia 10/16/2014     Priority: High   • Chronic low back pain 09/11/2014     Priority: Medium     Class: Chronic   • Acquired hypothyroidism 05/08/2014     Priority: Medium     Class: Chronic   • Glucose intolerance (impaired glucose tolerance) 05/08/2014     Priority: Medium     Class: Chronic   • Essential hypertension 07/05/2011     Priority: Medium   • Dyslipidemia 07/05/2011     Priority: Medium   • Gastroesophageal reflux disease without esophagitis 08/29/2016   • Skin neoplasm 06/27/2016   • Chronic pain of left knee 06/08/2016       Current Outpatient Prescriptions   Medication Sig Dispense Refill   • Diclofenac Sodium (VOLTAREN) 1 % Gel Apply 1 Inch to skin as directed 4 times a day. 5 Tube 3   • potassium chloride ER (KLOR-CON) 10 MEQ tablet TAKE ONE TABLET BY MOUTH TWICE DAILY 60 Tab 6   • levothyroxine (SYNTHROID) 88 MCG Tab Take 0.5 Tabs by mouth Every morning on an empty stomach. 45 Tab 3   • omeprazole (PRILOSEC) 20 MG delayed-release capsule TAKE ONE CAPSULE BY MOUTH ONCE DAILY 90 Cap 4   • simvastatin (ZOCOR) 40 MG Tab TAKE ONE TABLET BY MOUTH ONCE DAILY 90 Tab 3   • furosemide (LASIX) 20 MG Tab TAKE ONE TABLET BY MOUTH ONCE DAILY 90 Tab 4   • metoprolol SR (TOPROL XL) 50 MG TABLET SR 24 HR TAKE ONE TABLET BY MOUTH ONCE DAILY 90 Tab 3   • aspirin EC (ECOTRIN) 81 MG TBEC Take 81 mg by mouth every day.     • VITAMIN D, CHOLECALCIFEROL, PO Take 2,000  Units by mouth every day.     • verapamil ER (CALAN-SR) 240 MG Tab CR      • Verapamil HCl  MG CAPSULE SR 24 HR TAKE ONE CAPSULE BY MOUTH ONCE DAILY 90 Cap 4   • losartan (COZAAR) 25 MG Tab TAKE ONE TABLET BY MOUTH ONCE DAILY IN THE MORNING 90 Tab 3     No current facility-administered medications for this visit.         Allergies as of 04/18/2017 - Martínez as Reviewed 04/18/2017   Allergen Reaction Noted   • Demerol Vomiting 11/14/2011   • Nsaids  07/05/2011        ROS: Denies any chest pain, Shortness of breath, Changes bowel or bladder, Lower extremity edema.    Physical Exam:  /88 mmHg  Pulse 115  Temp(Src) 37 °C (98.6 °F)  Resp 14  Ht 1.524 m (5')  Wt 66.2 kg (145 lb 15.1 oz)  BMI 28.50 kg/m2  SpO2 95%  Gen.: Well-developed, well-nourished, no apparent distress,pleasant and cooperative with the examination  Skin:  - Small ( less them 0.5 cm)crusty pimples on the right face            - brown skin lesion, 0.5-1 cm in diameter, no bleeding( left forearm)          Assessment and Plan.   76 y.o. female     1. Skin lesions  On the face (right check): R/O BCC  On the forearm( left): R/O Melanoma.  Will refer to dermatology (urgent referral placed).Call Me or PCP for any issue with referral.    - REFERRAL TO DERMATOLOGY

## 2017-06-01 DIAGNOSIS — I10 ESSENTIAL HYPERTENSION: ICD-10-CM

## 2017-06-01 RX ORDER — FUROSEMIDE 20 MG/1
20 TABLET ORAL DAILY
Qty: 90 TAB | Refills: 3 | Status: SHIPPED | OUTPATIENT
Start: 2017-06-01 | End: 2018-02-02 | Stop reason: SDUPTHER

## 2017-06-01 RX ORDER — METOPROLOL SUCCINATE 50 MG/1
50 TABLET, EXTENDED RELEASE ORAL DAILY
Qty: 90 TAB | Refills: 3 | Status: SHIPPED | OUTPATIENT
Start: 2017-06-01 | End: 2018-06-02 | Stop reason: SDUPTHER

## 2017-06-19 ENCOUNTER — HOSPITAL ENCOUNTER (OUTPATIENT)
Dept: LAB | Facility: MEDICAL CENTER | Age: 77
End: 2017-06-19
Attending: INTERNAL MEDICINE
Payer: MEDICARE

## 2017-06-19 ENCOUNTER — TELEPHONE (OUTPATIENT)
Dept: MEDICAL GROUP | Facility: MEDICAL CENTER | Age: 77
End: 2017-06-19

## 2017-06-19 DIAGNOSIS — N39.0 RECURRENT UTI: ICD-10-CM

## 2017-06-19 DIAGNOSIS — R30.0 DYSURIA: ICD-10-CM

## 2017-06-19 DIAGNOSIS — N30.00 ACUTE CYSTITIS WITHOUT HEMATURIA: ICD-10-CM

## 2017-06-19 LAB
APPEARANCE UR: CLEAR
BACTERIA #/AREA URNS HPF: ABNORMAL /HPF
BILIRUB UR QL STRIP.AUTO: NEGATIVE
COLOR UR: ABNORMAL
CULTURE IF INDICATED INDCX: YES UA CULTURE
EPI CELLS #/AREA URNS HPF: ABNORMAL /HPF
GLUCOSE UR STRIP.AUTO-MCNC: NEGATIVE MG/DL
HYALINE CASTS #/AREA URNS LPF: ABNORMAL /LPF
KETONES UR STRIP.AUTO-MCNC: NEGATIVE MG/DL
LEUKOCYTE ESTERASE UR QL STRIP.AUTO: ABNORMAL
MICRO URNS: ABNORMAL
NITRITE UR QL STRIP.AUTO: NEGATIVE
PH UR STRIP.AUTO: 6 [PH]
PROT UR QL STRIP: NEGATIVE MG/DL
RBC # URNS HPF: ABNORMAL /HPF
RBC UR QL AUTO: NEGATIVE
SP GR UR STRIP.AUTO: 1.01
WBC #/AREA URNS HPF: ABNORMAL /HPF

## 2017-06-19 PROCEDURE — 87077 CULTURE AEROBIC IDENTIFY: CPT

## 2017-06-19 PROCEDURE — 87086 URINE CULTURE/COLONY COUNT: CPT

## 2017-06-19 PROCEDURE — 81001 URINALYSIS AUTO W/SCOPE: CPT

## 2017-06-19 PROCEDURE — 87186 SC STD MICRODIL/AGAR DIL: CPT

## 2017-06-19 RX ORDER — NITROFURANTOIN 25; 75 MG/1; MG/1
100 CAPSULE ORAL 2 TIMES DAILY
Qty: 10 CAP | Refills: 0 | Status: SHIPPED | OUTPATIENT
Start: 2017-06-19 | End: 2017-07-18 | Stop reason: SDUPTHER

## 2017-06-19 RX ORDER — SULFAMETHOXAZOLE AND TRIMETHOPRIM 800; 160 MG/1; MG/1
1 TABLET ORAL 2 TIMES DAILY
Qty: 28 TAB | OUTPATIENT
Start: 2017-06-19

## 2017-06-19 NOTE — TELEPHONE ENCOUNTER
1. Caller Name: Jill Diaz                                           Call Back Number: [phone] (home)         Patient approves a detailed voicemail message: N\A    Patient wanted to know if you would order a urinalysis, patient is having signs of UTI? Please advise

## 2017-06-21 LAB
BACTERIA UR CULT: ABNORMAL
BACTERIA UR CULT: ABNORMAL
SIGNIFICANT IND 70042: ABNORMAL
SOURCE SOURCE: ABNORMAL

## 2017-07-05 ENCOUNTER — HOSPITAL ENCOUNTER (OUTPATIENT)
Dept: LAB | Facility: MEDICAL CENTER | Age: 77
End: 2017-07-05
Attending: INTERNAL MEDICINE
Payer: MEDICARE

## 2017-07-05 DIAGNOSIS — E78.5 DYSLIPIDEMIA: ICD-10-CM

## 2017-07-05 LAB
ANION GAP SERPL CALC-SCNC: 10 MMOL/L (ref 0–11.9)
BUN SERPL-MCNC: 15 MG/DL (ref 8–22)
CALCIUM SERPL-MCNC: 9.5 MG/DL (ref 8.5–10.5)
CHLORIDE SERPL-SCNC: 105 MMOL/L (ref 96–112)
CO2 SERPL-SCNC: 28 MMOL/L (ref 20–33)
CREAT SERPL-MCNC: 1.08 MG/DL (ref 0.5–1.4)
GFR SERPL CREATININE-BSD FRML MDRD: 49 ML/MIN/1.73 M 2
GLUCOSE SERPL-MCNC: 91 MG/DL (ref 65–99)
POTASSIUM SERPL-SCNC: 4.1 MMOL/L (ref 3.6–5.5)
SODIUM SERPL-SCNC: 143 MMOL/L (ref 135–145)

## 2017-07-05 PROCEDURE — 80048 BASIC METABOLIC PNL TOTAL CA: CPT

## 2017-07-05 PROCEDURE — 36415 COLL VENOUS BLD VENIPUNCTURE: CPT

## 2017-07-05 RX ORDER — SIMVASTATIN 40 MG
40 TABLET ORAL EVERY EVENING
Qty: 90 TAB | Refills: 0 | Status: SHIPPED | OUTPATIENT
Start: 2017-07-05 | End: 2017-09-30 | Stop reason: SDUPTHER

## 2017-07-07 ENCOUNTER — HOSPITAL ENCOUNTER (OUTPATIENT)
Dept: CARDIOLOGY | Facility: MEDICAL CENTER | Age: 77
End: 2017-07-07
Attending: INTERNAL MEDICINE
Payer: MEDICARE

## 2017-07-07 DIAGNOSIS — I10 ESSENTIAL HYPERTENSION, BENIGN: ICD-10-CM

## 2017-07-07 PROCEDURE — 93306 TTE W/DOPPLER COMPLETE: CPT

## 2017-07-10 ENCOUNTER — TELEPHONE (OUTPATIENT)
Dept: MEDICAL GROUP | Facility: MEDICAL CENTER | Age: 77
End: 2017-07-10

## 2017-07-10 NOTE — TELEPHONE ENCOUNTER
Future Appointments       Provider Department Center    7/18/2017 11:00 AM Hector Neville M.D. [facility] [address]        ESTABLISHED PATIENT PRE-VISIT PLANNING     Note: Patient will not be contacted if there is no indication to call.     1.  Reviewed note from last office visit with PCP and/or other med group provider: Yes    2.  If any orders were placed at last visit, do we have Results/Consult Notes?        •  Labs - Labs ordered, completed and results are in chart.       •  Imaging - Imaging was not ordered at last office visit.       •  Referrals - Referral ordered, patient has NOT been seen.    3.  Immunizations were updated in [employer] using [employer]?: Yes       •  [employer] Recommendations: HEPATITIS A , MMR , TD and ZOSTAVAX (Shingles)    4.  Patient is due for the following Health Maintenance Topics:   Health Maintenance Due   Topic Date Due   • IMM DTaP/Tdap/Td Vaccine (1 - Tdap) 08/20/1959   • IMM ZOSTER VACCINE  08/20/2000           5.  Patient was not informed to arrive 15 min prior to their scheduled appointment and bring in their medication bottles.

## 2017-07-11 ENCOUNTER — TELEPHONE (OUTPATIENT)
Dept: MEDICAL GROUP | Facility: MEDICAL CENTER | Age: 77
End: 2017-07-11

## 2017-07-11 DIAGNOSIS — R41.3 LOSS OF MEMORY: ICD-10-CM

## 2017-07-11 LAB
LV EJECT FRACT MOD 2C 99903: 59.19
LV EJECT FRACT MOD 4C 99902: 51.89
LV EJECT FRACT MOD BP 99901: 54.85

## 2017-07-11 NOTE — TELEPHONE ENCOUNTER
1. Caller Name: Jill                                         Call Back Number: [phone]      Patient approves a detailed voicemail message: N\A    2. SPECIFIC Action To Be Taken: Referral pending, please sign.    3. Diagnosis/Clinical Reason for Request: Loss of memory    4. Specialty & Provider Name/Lab/Imaging Location: Neurology    5. Is appointment scheduled for requested order/referral: no    Patient informed they will receive a return phone call from the office ONLY if there are any questions before processing their request. Advised to call back if they haven't received a call from the referral department in 5 days.    Patient had an episode at granddaughter's wedding of loss of memory for about an hour. She did not lose consciousness. Her cardiologist would like her to see a neurologist. Insurance requires a referral from PCP.

## 2017-07-11 NOTE — TELEPHONE ENCOUNTER
Patient has been advised about the message below, I did let the patient know about the referral process.

## 2017-07-12 ENCOUNTER — HOSPITAL ENCOUNTER (OUTPATIENT)
Dept: LAB | Facility: MEDICAL CENTER | Age: 77
End: 2017-07-12
Attending: INTERNAL MEDICINE
Payer: MEDICARE

## 2017-07-12 DIAGNOSIS — R73.02 GLUCOSE INTOLERANCE (IMPAIRED GLUCOSE TOLERANCE): ICD-10-CM

## 2017-07-12 LAB
EST. AVERAGE GLUCOSE BLD GHB EST-MCNC: 117 MG/DL
HBA1C MFR BLD: 5.7 % (ref 0–5.6)

## 2017-07-12 PROCEDURE — 83036 HEMOGLOBIN GLYCOSYLATED A1C: CPT

## 2017-07-12 PROCEDURE — 36415 COLL VENOUS BLD VENIPUNCTURE: CPT

## 2017-07-17 RX ORDER — POTASSIUM CHLORIDE 750 MG/1
TABLET, FILM COATED, EXTENDED RELEASE ORAL
Qty: 180 TAB | Refills: 3 | Status: SHIPPED | OUTPATIENT
Start: 2017-07-17 | End: 2018-11-29 | Stop reason: SDUPTHER

## 2017-07-18 ENCOUNTER — OFFICE VISIT (OUTPATIENT)
Dept: MEDICAL GROUP | Facility: MEDICAL CENTER | Age: 77
End: 2017-07-18
Payer: MEDICARE

## 2017-07-18 VITALS
TEMPERATURE: 98.1 F | RESPIRATION RATE: 16 BRPM | HEIGHT: 60 IN | SYSTOLIC BLOOD PRESSURE: 116 MMHG | DIASTOLIC BLOOD PRESSURE: 80 MMHG | HEART RATE: 104 BPM | BODY MASS INDEX: 29.37 KG/M2 | OXYGEN SATURATION: 94 % | WEIGHT: 149.6 LBS

## 2017-07-18 DIAGNOSIS — R73.02 GLUCOSE INTOLERANCE (IMPAIRED GLUCOSE TOLERANCE): ICD-10-CM

## 2017-07-18 DIAGNOSIS — R41.3 TRANSIENT MEMORY LOSS: ICD-10-CM

## 2017-07-18 DIAGNOSIS — E03.9 ACQUIRED HYPOTHYROIDISM: ICD-10-CM

## 2017-07-18 DIAGNOSIS — I10 ESSENTIAL HYPERTENSION: ICD-10-CM

## 2017-07-18 DIAGNOSIS — K21.9 GASTROESOPHAGEAL REFLUX DISEASE WITHOUT ESOPHAGITIS: ICD-10-CM

## 2017-07-18 DIAGNOSIS — Z00.00 MEDICARE ANNUAL WELLNESS VISIT, SUBSEQUENT: ICD-10-CM

## 2017-07-18 DIAGNOSIS — E78.5 DYSLIPIDEMIA: ICD-10-CM

## 2017-07-18 PROCEDURE — G0439 PPPS, SUBSEQ VISIT: HCPCS | Mod: 25 | Performed by: INTERNAL MEDICINE

## 2017-07-18 PROCEDURE — 99213 OFFICE O/P EST LOW 20 MIN: CPT | Mod: 25 | Performed by: INTERNAL MEDICINE

## 2017-07-18 RX ORDER — NITROFURANTOIN 25; 75 MG/1; MG/1
100 CAPSULE ORAL 2 TIMES DAILY
Qty: 10 CAP | Refills: 0 | Status: SHIPPED | OUTPATIENT
Start: 2017-07-18 | End: 2017-07-18

## 2017-07-18 ASSESSMENT — PATIENT HEALTH QUESTIONNAIRE - PHQ9: CLINICAL INTERPRETATION OF PHQ2 SCORE: 0

## 2017-07-18 NOTE — MR AVS SNAPSHOT
Jill Diaz   2017 11:00 AM   Office Visit   MRN: [id number]    Department:  [address] [place] [address]   Dept Phone:  [phone]    Description:  Female : 1940   Provider:  Hector Neville M.D.           Reason for Visit     Memory Loss discuss episode of memory loss    Results review A1c      Allergies as of 2017     Allergen Noted Reactions    Demerol 2011   Vomiting    Nsaids 2011       swelling      You were diagnosed with     Medicare annual wellness visit, subsequent   [713057]       Essential hypertension   [3671294]       Dyslipidemia   [874602]       Acquired hypothyroidism   [1634620]       Glucose intolerance (impaired glucose tolerance)   [865579]       Gastroesophageal reflux disease without esophagitis   [804913]       Transient memory loss   [715149]         Vital Signs     Blood Pressure Pulse Temperature Respirations Height Weight    116/80 mmHg 104 36.7 °C (98.1 °F) 16 1.524 m (5') 67.858 kg (149 lb 9.6 oz)    Body Mass Index Oxygen Saturation Smoking Status             29.22 kg/m2 94% Former Smoker         Basic Information     Date Of Birth Sex Race Ethnicity Preferred Language    1940 Female White Non- English      Problem List              ICD-10-CM Priority Class Noted - Resolved    Essential hypertension I10 Medium  2011 - Present    Dyslipidemia E78.5 Medium  2011 - Present    Acquired hypothyroidism E03.9 Medium Chronic 2014 - Present    Glucose intolerance (impaired glucose tolerance) R73.02 Medium Chronic 2014 - Present    Arrhythmia I49.9 High  10/16/2014 - Present    Chronic pain of left knee M25.562, G89.29   2016 - Present    Skin neoplasm D49.2   2016 - Present    Gastroesophageal reflux disease without esophagitis K21.9   2016 - Present      Health Maintenance        Date Due Completion Dates    IMM ZOSTER VACCINE 2000 ---    IMM INFLUENZA (1) 2017, 10/6/2015, 2014, 2014      MAMMOGRAM 10/14/2017 10/14/2016, 10/3/2016, 3/1/2014 (Prv Comp), 3/28/2013, 3/22/2012, 6/27/2011, 3/2/2011, 1/14/2009, 1/14/2009, 7/25/2007, 7/25/2007, 11/1/2005, 5/25/2004    Override on 3/1/2014: Previously completed    BONE DENSITY 10/17/2019 10/17/2014 (Prv Comp), 6/24/2011    Override on 10/17/2014: Previously completed    COLONOSCOPY 8/21/2023 8/21/2013    IMM DTaP/Tdap/Td Vaccine (2 - Td) 4/18/2025 4/18/2015            Current Immunizations     13-VALENT PCV PREVNAR 8/29/2015    Influenza TIV (IM) 9/1/2014    Influenza Vaccine Adult HD 9/4/2016, 10/6/2015 10:19 AM, 9/30/2014    Pneumococcal polysaccharide vaccine (PPSV-23) 1/10/2012    Tdap Vaccine 4/18/2015      Below and/or attached are the medications your provider expects you to take. Review all of your home medications and newly ordered medications with your provider and/or pharmacist. Follow medication instructions as directed by your provider and/or pharmacist. Please keep your medication list with you and share with your provider. Update the information when medications are discontinued, doses are changed, or new medications (including over-the-counter products) are added; and carry medication information at all times in the event of emergency situations     Allergies:  DEMEROL - Vomiting     NSAIDS - (reactions not documented)               Medications  Valid as of: July 18, 2017 - 11:38 AM    Generic Name Brand Name Tablet Size Instructions for use    Aspirin (Tablet Delayed Response) ECOTRIN 81 MG Take 81 mg by mouth every day.        Cholecalciferol   Take 2,000 Units by mouth every day.        Diclofenac Sodium (Gel) Diclofenac Sodium 1 % Apply 1 Inch to skin as directed 4 times a day.        Furosemide (Tab) LASIX 20 MG Take 1 Tab by mouth every day.        Levothyroxine Sodium (Tab) SYNTHROID 88 MCG Take 0.5 Tabs by mouth Every morning on an empty stomach.        Metoprolol Succinate (TABLET SR 24 HR) TOPROL XL 50 MG Take 1 Tab by mouth  every day.        Nitrofurantoin Monohyd Macro (Cap) MACROBID 100 MG Take 1 Cap by mouth 2 times a day for 5 days.        Omeprazole (CAPSULE DELAYED RELEASE) PRILOSEC 20 MG TAKE ONE CAPSULE BY MOUTH ONCE DAILY        Potassium Chloride (Tab CR) KLOR-CON 10 MEQ TAKE ONE TABLET BY MOUTH TWICE DAILY        Simvastatin (Tab) ZOCOR 40 MG Take 1 Tab by mouth every evening.        Verapamil HCl (Tab CR) CALAN- MG         .                 Medicines prescribed today were sent to:     [address] (), [place] - [address]    [address] () [place] [address]    Phone: [phone] Fax: [phone]    Open 24 Hours?: No      Medication refill instructions:       If your prescription bottle indicates you have medication refills left, it is not necessary to call your provider’s office. Please contact your pharmacy and they will refill your medication.    If your prescription bottle indicates you do not have any refills left, you may request refills at any time through one of the following ways: The online [employer] system (except Urgent Care), by calling your provider’s office, or by asking your pharmacy to contact your provider’s office with a refill request. Medication refills are processed only during regular business hours and may not be available until the next business day. Your provider may request additional information or to have a follow-up visit with you prior to refilling your medication.   *Please Note: Medication refills are assigned a new Rx number when refilled electronically. Your pharmacy may indicate that no refills were authorized even though a new prescription for the same medication is available at the pharmacy. Please request the medicine by name with the pharmacy before contacting your provider for a refill.        Your To Do List     Future Labs/Procedures Complete By Expires    MR-BRAIN-W/O  As directed 7/18/2018    US-CAROTID DOPPLER  As directed 1/18/2018          [employer] Access Code: Activation code not generated  Current [employer] Status: Active

## 2017-07-18 NOTE — PROGRESS NOTES
CC: Memory loss due for wellness examination.    HPI:   Jill presents today with the following.    1. Medicare annual wellness visit, subsequent  Screenings performed below    2. Essential hypertension  Patient recently taken off verapamil and start by her cardiologist. She denies any chest pain or palpitations no edema.    3. Dyslipidemia  Maintain on statin with good control denying any myalgias.    4. Acquired hypothyroidism  Thyroid adequate replaced as last check denying any hair skin changes.    5. Glucose intolerance (impaired glucose tolerance)  History of elevated blood sugar recent check A1c at 5.7 stable. Weight has gone up slightly since last visit.    6. Gastroesophageal reflux disease without esophagitis  Reports controlled symptomology no blood in her stool or dark tarry stool.    7. Transient memory loss  Presents after an episode approximately one week ago while at her daughter's wedding. She reports she has no recollection of the ceremony completely. Family members were concerned for her and she states some nurses at the wedding looked at her and she had no obvious slurred speech blurred vision or focal numbness or weakness. She does not remember the event herself that last approximately 2 hours. She was seen by cardiology who ordered echocardiogram. She denies any residual effects and has not had anything previous similar and nothing similar after.      Depression Screening    Little interest or pleasure in doing things?  0 - not at all  Feeling down, depressed , or hopeless? 0 - not at all  Trouble falling or staying asleep, or sleeping too much?     Feeling tired or having little energy?     Poor appetite or overeating?     Feeling bad about yourself - or that you are a failure or have let yourself or your family down?    Trouble concentrating on things, such as reading the newspaper or watching television?    Moving or speaking so slowly that other people could have noticed.  Or the  opposite - being so fidgety or restless that you have been moving around a lot more than usual?     Thoughts that you would be better off dead, or of hurting yourself?     Patient Health Questionnaire Score:      If depressive symptoms identified deferred to follow up visit unless specifically addressed in assessment and plan.    Interpretation of PHQ-9 Total Score   Score Severity   1-4 No Depression   5-9 Mild Depression   10-14 Moderate Depression   15-19 Moderately Severe Depression   20-27 Severe Depression      Screening for Cognitive Impairment    Three Minute Recall (apple, watch, petar)  3/3    Draw clock face with all 12 numbers set to the hand to show 10 minutes past 11 o'clock  1 5/5  Cognitive concerns identified deferred for follow up unless specifically addressed in assessment and plan.    Fall Risk Assessment    Has the patient had two or more falls in the last year or any fall with injury in the last year?  No    Safety Assessment    Throw rugs on floor.  Yes  Handrails on all stairs.  No  Good lighting in all hallways.  No  Difficulty hearing.  Yes  Patient counseled about all safety risks that were identified.    Functional Assessment ADLs    Are there any barriers preventing you from cooking for yourself or meeting nutritional needs?  No.    Are there any barriers preventing you from driving safely or obtaining transportation?  No.    Are there any barriers preventing you from using a telephone or calling for help?  No.    Are there any barriers preventing you from shopping?  No.    Are there any barriers preventing you from taking care of your own finances?  No.    Are there any barriers preventing you from managing your medications?  No.    Are currently engaging any exercise or physical activity?  Yes.       Health Maintenance Summary                IMM ZOSTER VACCINE Overdue 8/20/2000     Annual Wellness Visit Next Due 8/30/2017      Done 8/29/2016 Visit Dx: Medicare annual wellness visit,  subsequent     Patient has more history with this topic...    IMM INFLUENZA Next Due 9/1/2017      Done 9/4/2016 Imm Admin: Influenza Vaccine Adult HD     Patient has more history with this topic...    MAMMOGRAM Next Due 10/14/2017      Done 10/14/2016 MA-DIAGNOSTIC MAMMO-UNILAT     Patient has more history with this topic...    BONE DENSITY Next Due 10/17/2019      Previously completed 10/17/2014      Patient has more history with this topic...    COLONOSCOPY Next Due 8/21/2023      Done 8/21/2013 AMB REFERRAL TO GI FOR COLONOSCOPY    IMM DTaP/Tdap/Td Vaccine Next Due 4/18/2025      Done 4/18/2015 Imm Admin: Tdap Vaccine          Patient Care Team:  Hector Neville M.D. as PCP - General (Internal Medicine)  Tereso Hearn M.D. as Consulting Physician (Cardiology)  Micaela Judd M.D. as Consulting Physician (Orthopaedics)      Patient Active Problem List    Diagnosis Date Noted   • Arrhythmia 10/16/2014     Priority: High   • Acquired hypothyroidism 05/08/2014     Priority: Medium     Class: Chronic   • Glucose intolerance (impaired glucose tolerance) 05/08/2014     Priority: Medium     Class: Chronic   • Essential hypertension 07/05/2011     Priority: Medium   • Dyslipidemia 07/05/2011     Priority: Medium   • Gastroesophageal reflux disease without esophagitis 08/29/2016   • Skin neoplasm 06/27/2016   • Chronic pain of left knee 06/08/2016       Current Outpatient Prescriptions   Medication Sig Dispense Refill   • nitrofurantoin monohydr macro (MACROBID) 100 MG Cap Take 1 Cap by mouth 2 times a day for 5 days. 10 Cap 0   • potassium chloride ER (KLOR-CON) 10 MEQ tablet TAKE ONE TABLET BY MOUTH TWICE DAILY 180 Tab 3   • simvastatin (ZOCOR) 40 MG Tab Take 1 Tab by mouth every evening. 90 Tab 0   • metoprolol SR (TOPROL XL) 50 MG TABLET SR 24 HR Take 1 Tab by mouth every day. 90 Tab 3   • furosemide (LASIX) 20 MG Tab Take 1 Tab by mouth every day. 90 Tab 3   • Diclofenac Sodium (VOLTAREN) 1 % Gel Apply 1 Inch to skin  as directed 4 times a day. 5 Tube 3   • levothyroxine (SYNTHROID) 88 MCG Tab Take 0.5 Tabs by mouth Every morning on an empty stomach. 45 Tab 3   • omeprazole (PRILOSEC) 20 MG delayed-release capsule TAKE ONE CAPSULE BY MOUTH ONCE DAILY 90 Cap 4   • aspirin EC (ECOTRIN) 81 MG TBEC Take 81 mg by mouth every day.     • VITAMIN D, CHOLECALCIFEROL, PO Take 2,000 Units by mouth every day.     • verapamil ER (CALAN-SR) 240 MG Tab CR        No current facility-administered medications for this visit.         Allergies as of 07/18/2017 - Martínez as Reviewed 07/18/2017   Allergen Reaction Noted   • Demerol Vomiting 11/14/2011   • Nsaids  07/05/2011        ROS: As per HPI.    /80 mmHg  Pulse 104  Temp(Src) 36.7 °C (98.1 °F)  Resp 16  Ht 1.524 m (5')  Wt 67.858 kg (149 lb 9.6 oz)  BMI 29.22 kg/m2  SpO2 94%    Physical Exam:  Gen:         Alert and oriented, No apparent distress.  Neck:        No Lymphadenopathy or Bruits.  Lungs:     Clear to auscultation bilaterally  CV:          Regular rate and rhythm. No murmurs, rubs or gallops.  Abd:         Soft non tender, non distended. Normal active bowel sounds.  No  Hepatosplenomegaly, No pulsatile masses.                   Ext:          No clubbing, cyanosis, edema.  Neuro:  CN II-XII gorssly intact, Strenght 5/5 upper lower extremities, DTR's 2+ both               Upper and lower extremities.  Down going Babinski, Neg Romberg.    Assessment and Plan.   76 y.o. female with the following issues.    1. Medicare annual wellness visit, subsequent  Discussed healthy lifestyle habits as well as screening regimens. Information given on advanced directives    2. Essential hypertension  Currently well controlled, Discuss diet, exercise and salt restriction.    3. Dyslipidemia  Lipids currently well controlled. Discussed continued diet and exercise recheck 6 months to 1 year.    4. Acquired hypothyroidism  Currently adequately replaced, recheck 6 months to one year.    5. Glucose  intolerance (impaired glucose tolerance)  Discussed diet exercise and mild weight loss.    6. Gastroesophageal reflux disease without esophagitis  Clinical history stable no change to treatment    7. Transient memory loss  No alcohol involved during this event sounds like possible TAA already had echocardiogram. Have ordered MRI of the brain as well as carotid ultrasound and she is already been referred to neurology. Have given ER precautions if she has any further episodes.  - MR-BRAIN-W/O; Future  - US-CAROTID DOPPLER; Future

## 2017-07-31 ENCOUNTER — HOSPITAL ENCOUNTER (OUTPATIENT)
Dept: RADIOLOGY | Facility: MEDICAL CENTER | Age: 77
End: 2017-07-31
Attending: INTERNAL MEDICINE
Payer: MEDICARE

## 2017-07-31 DIAGNOSIS — R41.3 TRANSIENT MEMORY LOSS: ICD-10-CM

## 2017-07-31 PROCEDURE — 93880 EXTRACRANIAL BILAT STUDY: CPT

## 2017-07-31 PROCEDURE — 70551 MRI BRAIN STEM W/O DYE: CPT

## 2017-08-24 ENCOUNTER — PATIENT OUTREACH (OUTPATIENT)
Dept: HEALTH INFORMATION MANAGEMENT | Facility: OTHER | Age: 77
End: 2017-08-24

## 2017-08-24 NOTE — PROGRESS NOTES
Outbound call to [facility] for medication review. Patient is currently driving and will be out of town until 9/6. Scheduled review for 9/7 at 10 am.     Gwen Skaggs, JASOND

## 2017-09-07 ENCOUNTER — PATIENT OUTREACH (OUTPATIENT)
Dept: HEALTH INFORMATION MANAGEMENT | Facility: OTHER | Age: 77
End: 2017-09-07

## 2017-09-07 NOTE — PROGRESS NOTES
Outbound call to [facility] for scheduled medication review. Jaycee states she was just heading out to go to another appointment. Rescheduled review for tomorrow morning 9/8.     Gwen Skaggs, PharmD

## 2017-09-08 ENCOUNTER — PATIENT OUTREACH (OUTPATIENT)
Dept: HEALTH INFORMATION MANAGEMENT | Facility: OTHER | Age: 77
End: 2017-09-08

## 2017-09-08 NOTE — PROGRESS NOTES
Outbound call to [facility] for medication reconciliation.    Updated allergy and medication lists.    Patient demonstrates adherence to medication schedule and understanding of indications for medications. Patient utilizes a weekly pill container and reports no missed doses in the past week. Her  is very organized and helps patient with medication management.     Medications that meet Beer's criteria for potentially inappropriate use in patients over 65 include:  Omeprazole - patient states she has rebound symptoms of GERD if she misses one dose of this medication. She states this is effective and she does not wish to discontinue.     Patient denies any side effects from medications.     Patient feels satisfied with medication regimen.      Patient can afford medications now that she has been taken off of the higher co-pay medications.     Jill does not use tobacco. She quit 42 years ago.     Jill monitors her blood pressures about weekly and states values are around 117/ 70s-80s.     Calculated CrCl = 37.5 mL/min. Medications dosed appropriately.     Reviewed vaccinations with patient. Patient states she had received her shingles vaccination in the past but cannot recall the date. She believes it was around 2012.     Plan:    Recommend annual flu vaccination. Patient plans to get within the upcoming month.    Gwen Skaggs, PharmD

## 2017-11-01 DIAGNOSIS — E03.9 ACQUIRED HYPOTHYROIDISM: ICD-10-CM

## 2017-11-01 RX ORDER — LEVOTHYROXINE SODIUM 88 UG/1
44 TABLET ORAL
Qty: 45 TAB | Refills: 11 | Status: SHIPPED | OUTPATIENT
Start: 2017-11-01 | End: 2019-09-09 | Stop reason: SDUPTHER

## 2017-11-13 ENCOUNTER — OFFICE VISIT (OUTPATIENT)
Dept: NEUROLOGY | Facility: MEDICAL CENTER | Age: 77
End: 2017-11-13
Payer: MEDICARE

## 2017-11-13 VITALS
WEIGHT: 148.1 LBS | HEIGHT: 60 IN | RESPIRATION RATE: 16 BRPM | OXYGEN SATURATION: 95 % | SYSTOLIC BLOOD PRESSURE: 138 MMHG | DIASTOLIC BLOOD PRESSURE: 80 MMHG | BODY MASS INDEX: 29.08 KG/M2 | TEMPERATURE: 97.3 F | HEART RATE: 71 BPM

## 2017-11-13 DIAGNOSIS — S06.0X9S: ICD-10-CM

## 2017-11-13 DIAGNOSIS — G45.4 TRANSIENT GLOBAL AMNESIA: ICD-10-CM

## 2017-11-13 PROBLEM — S06.0XAA BRAIN CONCUSSION: Status: ACTIVE | Noted: 2017-11-13

## 2017-11-13 PROCEDURE — 99204 OFFICE O/P NEW MOD 45 MIN: CPT | Performed by: PSYCHIATRY & NEUROLOGY

## 2017-11-13 RX ORDER — CHOLECALCIFEROL (VITAMIN D3) 125 MCG
1000 CAPSULE ORAL DAILY
COMMUNITY
End: 2018-04-19

## 2017-11-13 NOTE — PROGRESS NOTES
NEUROLOGY NOTE    Referring Physician  Hcetor Neville M.D.      CHIEF COMPLAINT:  July 1st --she lost the memory for that day  Her daughter's marriage date ( July 1st 2017)  Chief Complaint   Patient presents with   • Establish Care     Memory loss       PRESENT ILLNESS:   July 1st --she lost the memory for that day  Her daughter's marriage date ( July 1st 2017)        PAST MEDICAL HISTORY:  Past Medical History:   Diagnosis Date   • Arrhythmia     PAT, last episode 2009; cardiologist, Dr. Hearn   • Cancer (CMS-[facility])     squamous cell skin   • Chronic pain of left knee 6/8/2016   • Dental disorder     implants   • Heart burn    • Hiatus hernia syndrome    • Hyperlipidemia 7/5/2011   • Hypertension 7/5/2011   • Indigestion    • Kidney stones    • Pneumonia    • Psychiatric problem     depression   • Renal disorder 2009    stones   • Unspecified urinary incontinence    • Urinary bladder disorder        PAST SURGICAL HISTORY:  Past Surgical History:   Procedure Laterality Date   • COLPOSACROPEXY ROBOTIC  11/14/2011    Performed by SUMMER RAINES at SURGERY [facility]S   • CYSTOSCOPY  11/14/2011    Performed by SUMMER RAINES at SURGERY [facility]   • HYSTERECTOMY, TOTAL ABDOMINAL  1989   • CHOLECYSTECTOMY  1971   • ABDOMINAL HYSTERECTOMY TOTAL     • GYN SURGERY      bladder slings x2       FAMILY HISTORY:  Family History   Problem Relation Age of Onset   • Lung Disease Father    • Cancer Mother    • Arrythmia Sister        SOCIAL HISTORY:  Social History     Social History   • Marital status:      Spouse name: N/A   • Number of children: N/A   • Years of education: N/A     Occupational History   • Not on file.     Social History Main Topics   • Smoking status: Former Smoker     Packs/day: 1.00     Years: 12.00     Quit date: 1/1/1975   • Smokeless tobacco: Never Used   • Alcohol use No   • Drug use: No   • Sexual activity: Not Currently     Other Topics Concern   • Not on file     Social  History Narrative   • No narrative on file     ALLERGIES:  Allergies   Allergen Reactions   • Demerol Vomiting   • Nsaids      swelling     TOBHX  History   Smoking Status   • Former Smoker   • Packs/day: 1.00   • Years: 12.00   • Quit date: 1/1/1975   Smokeless Tobacco   • Never Used     ALCHX  History   Alcohol Use No     DRUGHX  History   Drug Use No           MEDICATIONS:  Current Outpatient Prescriptions   Medication   • cyanocobalamin (VITAMIN B-12) 500 MCG Tab   • levothyroxine (SYNTHROID) 88 MCG Tab   • simvastatin (ZOCOR) 40 MG Tab   • potassium chloride ER (KLOR-CON) 10 MEQ tablet   • metoprolol SR (TOPROL XL) 50 MG TABLET SR 24 HR   • furosemide (LASIX) 20 MG Tab   • omeprazole (PRILOSEC) 20 MG delayed-release capsule   • aspirin EC (ECOTRIN) 81 MG TBEC   • VITAMIN D, CHOLECALCIFEROL, PO   • Diclofenac Sodium (VOLTAREN) 1 % Gel     No current facility-administered medications for this visit.        REVIEW OF SYSTEM:    Constitutional: Denies fevers, Denies weight changes   Eyes: Denies changes in vision, no eye pain   Ears/Nose/Throat/Mouth: Denies nasal congestion or sore throat   Cardiovascular: Denies chest pain or palpitations   Respiratory: Denies SOB.   Gastrointestinal/Hepatic: Denies abdominal pain, nausea, vomiting, diarrhea, constipation or GI bleeding   Genitourinary: Denies bladder dysfunction, dysuria or frequency   Musculoskeletal/Rheum: Denies joint pain and swelling   Skin/Breast: Denies rash, denies breast lumps or discharge   Neurological: memory problems on July 1st 2017  Psychiatric: denies mood disorder   Endocrine: denies hx of diabetes or thyroid dysfunction   Heme/Oncology/Lymph Nodes: Denies enlarged lymph nodes, denies brusing or known bleeding disorder   Allergic/Immunologic: Denies hx of allergies         PHYSICAL AND NEUROLOGICAL EXMAINATIONS:  VITAL SIGNS: /80   Pulse 71   Temp 36.3 °C (97.3 °F)   Resp 16   Ht 1.524 m (5')   Wt 67.2 kg (148 lb 1.6 oz)   SpO2 95%    BMI 28.92 kg/m²   CURRENT WEIGHT: BMI: Body mass index is 28.92 kg/m².  PREVIOUS WEIGHTS:  Wt Readings from Last 25 Encounters:   17 67.2 kg (148 lb 1.6 oz)   17 67.9 kg (149 lb 9.6 oz)   17 66.2 kg (145 lb 15.1 oz)   17 67 kg (147 lb 11.3 oz)   17 66.7 kg (147 lb)   16 68 kg (150 lb)   16 68.9 kg (152 lb)   16 70.8 kg (156 lb)   08/04/15 68.9 kg (152 lb)   04/17/15 68.9 kg (152 lb)   01/22/15 68.5 kg (151 lb)   12/15/14 67.6 kg (149 lb)   14 68 kg (150 lb)   10/17/14 69.7 kg (153 lb 9.6 oz)   14 72.1 kg (159 lb)   14 73.5 kg (162 lb)   10/21/13 68.9 kg (152 lb)   13 68 kg (150 lb)   13 70.3 kg (155 lb)   10/18/11 69.9 kg (154 lb)   11 64.4 kg (142 lb)       General appearance of patient: WDWN(+) NAD(+)    EYES  o Fundus : Papilledem(-) Exudates(-) Hemorrhage(-)  Nervous System  Orientation to time, place and person(+)  Memory-- one day of memory problem on the day of her daughter's   Language: aphasia(-)  Knowledge: past(+) Current(+)  Attention(+)  Cranial Nerves  • Nerve 2: intact  • Nerve 3,4,6: intact  • Nerve 5 : intact  • Nerve 7: intact  • Nerve 8: intact  • Nerve 9 & 10: intact  • Nerve 11: intact  • Nerve 12: intact  Muscle Power and muscle tone: symmetric, normal in upper and lower  Sensory System: Pin sensation intact(+)  Reflexes: symmetric throughout  Cerebellar Function FNP normal   Gait : Steady(+) TandemGait steady(+)  Heart and Vascular  Peripheral Vasucular system : Edema (-) Swelling(-)  RHB, Breathing sound clear  abdomen bowel sound normoactive  Extremities freely moveable  Joints no contracture       NEUROIMAGING: I reviewed the MRI/CT of brain     Grandmother had dementia-- started at the age of 80+  Aunt developed dementia at the age of 80+  Mother  at the age of 80+ due to cancer      Registration 3/3  Recall 2/3    IMPRESSION:    1. One episode of  Memory loss at 2017--- she could not recall  what happened afterwards  2. Hx of brain concussion   3. Hx of kidney stones, Hypothyrodism, HTN  4. Mild cognitive decline for one year or so ( family hx of Dementia)    PLAN/RECOMMENDATIONS:      Explained to the patient that in order to make the diagnosis of TGA ( transient global amnesia)    We need the following information from the witness    1. People with TGA tends to ask the same questions again and again during that TGA spell  2. The patient should not have shaking and head injury before this TGA event ( the rationale: the exclude the post-ictal amnesia and posttraumatic amnesia)    Please talk to the witness and call us regarding the contents of conversation the patient had with the witness and her family during that wedding ceremony at July 1st 2017      In the meantime, we will offer EEG       Regarding the dementia prevention-- We would advise the following  ________________________________________________________________________        Exercise muscle and brain    Weight watch    Quit alcohol altogether    Could try fasting 12 hours every other day    Fasting is a challenge to your brain, and we think that your brain reacts by activating adaptive stress responses that help it cope with disease.    Reduce anxiety by praying, yoga, meditation and etc    Eat healthy    If memory continue to deteriorate, please call us again  ________________________________________________________________________    ________________________________________________________________________      Fasting is a challenge to your brain, and we think that your brain reacts by activating adaptive stress responses that help it cope with disease.  .  http://[email or web address].com/issues/spring-[email or web address]/are-[email or web address]-[email or web address]-benefits-to-fasting    ________________________________________________________________________              SIGNATURE:  Bunny Sorto    CC:  Hector Neville M.D.      7/2017  1. MRI OF THE  BRAIN WITHOUT CONTRAST WITHIN NORMAL LIMITS FOR AGE WITH MILD ATROPHY AND MILD WHITE MATTER CHANGES.

## 2018-02-02 DIAGNOSIS — I10 ESSENTIAL HYPERTENSION: ICD-10-CM

## 2018-02-02 RX ORDER — FUROSEMIDE 20 MG/1
TABLET ORAL
Qty: 90 TAB | Refills: 3 | Status: SHIPPED | OUTPATIENT
Start: 2018-02-02 | End: 2018-10-14 | Stop reason: SDUPTHER

## 2018-02-07 DIAGNOSIS — K21.9 GASTROESOPHAGEAL REFLUX DISEASE WITHOUT ESOPHAGITIS: ICD-10-CM

## 2018-02-07 RX ORDER — OMEPRAZOLE 20 MG/1
20 CAPSULE, DELAYED RELEASE ORAL DAILY
Qty: 90 CAP | Refills: 4 | Status: SHIPPED | OUTPATIENT
Start: 2018-02-07 | End: 2019-03-26 | Stop reason: SDUPTHER

## 2018-03-05 ENCOUNTER — NON-PROVIDER VISIT (OUTPATIENT)
Dept: NEUROLOGY | Facility: MEDICAL CENTER | Age: 78
End: 2018-03-05
Payer: MEDICARE

## 2018-03-05 DIAGNOSIS — G45.4 TRANSIENT GLOBAL AMNESIA: ICD-10-CM

## 2018-03-05 DIAGNOSIS — S06.0X9S: ICD-10-CM

## 2018-03-05 PROCEDURE — 95819 EEG AWAKE AND ASLEEP: CPT | Performed by: PSYCHIATRY & NEUROLOGY

## 2018-03-12 NOTE — PROGRESS NOTES
ROUTINE ELECTROENCEPHALOGRAM REPORT      NAME: Jill Diaz    REFERRING Dr:    INDICATION: 1. One episode of  Memory loss at July 1st 2017--- she could not recall what happened afterwards      TECHNIQUE: 30 channel routine electroencephalogram (EEG) was performed in accordance with the international 10-20 system. The study was reviewed in bipolar and referential montages. The recording examined the patient during wakeful and drowsy state(s).     DESCRIPTION OF THE RECORD:      Background rhythm during awake stage shows well-organized, well-developed, average voltage 10 to 11 hertz alpha activity in the posterior regions.  It blocks with eye opening and it is bilaterally synchronous and symmetrical.  No spike-and-wave discharges or any presistant lateralizing abnormalities are seen. There are some short theta/delta slow over the left(<2 seconds)  Photic stimulation did not produce any abnormalities.  Stage I sleep was achieved.      ACTIVATION PROCEDURES:      Photic Stimulation were done    ICTAL AND/OR INTERICTAL FINDINGS:    No focal or generalized epileptiform activity noted. No spike-and-wave discharges or any presistant lateralizing abnormalities are seen. There are some short theta/delta slow over the left(<2 seconds)   No clinical events or seizures were reported or recorded during the study.      EKG: sampling of the EKG recording demonstrated sinus rhythm.        INTERPRETATION:      ________________________________________________________________________    This scalp EEG is consistent with mild focal cortical dysfunction over the left.     This remains nonspecific.     If clinical suspicion of seizure remains high.  Prolonged  EEG   monitoring may be of help.    ________________________________________________________________________

## 2018-03-12 NOTE — PROCEDURES
DATE OF SERVICE:  03/05/2018    This outpatient EEG was done on 03/05/2018.    ROUTINE ELECTROENCEPHALOGRAM REPORT        NAME: Jill Diaz     REFERRING Dr:     INDICATION: 1. One episode of  Memory loss at July 1st 2017--- she could not recall what happened afterwards        TECHNIQUE: 30 channel routine electroencephalogram (EEG) was performed in accordance with the international 10-20 system. The study was reviewed in bipolar and referential montages. The recording examined the patient during wakeful and drowsy state(s).      DESCRIPTION OF THE RECORD:        Background rhythm during awake stage shows well-organized, well-developed, average voltage 10 to 11 hertz alpha activity in the posterior regions.  It blocks with eye opening and it is bilaterally synchronous and symmetrical.  No spike-and-wave discharges or any presistant lateralizing abnormalities are seen. There are some short theta/delta slow over the left(<2 seconds)  Photic stimulation did not produce any abnormalities.  Stage I sleep was achieved.        ACTIVATION PROCEDURES:       Photic Stimulation were done     ICTAL AND/OR INTERICTAL FINDINGS:    No focal or generalized epileptiform activity noted. No spike-and-wave discharges or any presistant lateralizing abnormalities are seen. There are some short theta/delta slow over the left(<2 seconds)   No clinical events or seizures were reported or recorded during the study.       EKG: sampling of the EKG recording demonstrated sinus rhythm.          INTERPRETATION:        ________________________________________________________________________     This scalp EEG is consistent with mild focal cortical dysfunction over the left.      This remains nonspecific.     If clinical suspicion of seizure remains high.  Prolonged  EEG   monitoring may be of help.     ________________________________________________________________________                        ____________________________________      MD SAFIA LYONS / ANIYA    DD:  03/11/2018 22:51:42  DT:  03/11/2018 23:04:49    D#:  [id number]  Job#:  [id number]

## 2018-03-14 ENCOUNTER — PATIENT OUTREACH (OUTPATIENT)
Dept: HEALTH INFORMATION MANAGEMENT | Facility: OTHER | Age: 78
End: 2018-03-14

## 2018-03-14 NOTE — PROGRESS NOTES
Attempt #:1    WebIZ Checked & Epic Updated: yes  [facility] Verified: no  Verify PCP: yes    Communication Preference Obtained: yes     Review Care Team: yes    Annual Wellness Visit Scheduling  1. Scheduling Status:Not Scheduled. Patient states they are not interested     Care Gap Scheduling (Attempt to Schedule EACH Overdue Care Gap!)  Health Maintenance Due   Topic Date Due   • IMM ZOSTER VACCINE  08/20/2000   • IMM INFLUENZA (1) 09/01/2017   • MAMMOGRAM  10/14/2017           MyChart Activation: already active

## 2018-03-23 ENCOUNTER — OFFICE VISIT (OUTPATIENT)
Dept: MEDICAL GROUP | Facility: MEDICAL CENTER | Age: 78
End: 2018-03-23
Payer: MEDICARE

## 2018-03-23 VITALS
HEIGHT: 60 IN | HEART RATE: 101 BPM | RESPIRATION RATE: 16 BRPM | TEMPERATURE: 98.1 F | BODY MASS INDEX: 29.25 KG/M2 | DIASTOLIC BLOOD PRESSURE: 84 MMHG | SYSTOLIC BLOOD PRESSURE: 124 MMHG | OXYGEN SATURATION: 97 % | WEIGHT: 149 LBS

## 2018-03-23 DIAGNOSIS — Z01.810 PREOP CARDIOVASCULAR EXAM: ICD-10-CM

## 2018-03-23 DIAGNOSIS — E03.9 ACQUIRED HYPOTHYROIDISM: ICD-10-CM

## 2018-03-23 DIAGNOSIS — R73.02 IGT (IMPAIRED GLUCOSE TOLERANCE): ICD-10-CM

## 2018-03-23 DIAGNOSIS — G89.29 CHRONIC PAIN OF LEFT KNEE: ICD-10-CM

## 2018-03-23 DIAGNOSIS — M25.562 CHRONIC PAIN OF LEFT KNEE: ICD-10-CM

## 2018-03-23 DIAGNOSIS — R41.3 MEMORY LOSS: ICD-10-CM

## 2018-03-23 DIAGNOSIS — E78.5 DYSLIPIDEMIA: ICD-10-CM

## 2018-03-23 PROCEDURE — 99214 OFFICE O/P EST MOD 30 MIN: CPT | Performed by: INTERNAL MEDICINE

## 2018-03-23 PROCEDURE — 93000 ELECTROCARDIOGRAM COMPLETE: CPT | Performed by: INTERNAL MEDICINE

## 2018-03-23 NOTE — PROGRESS NOTES
CC: Preop evaluation for knee surgery and multiple other issues.    HPI:   Jill presents today with the following.    1. Preop cardiovascular exam  Scheduled for upcoming surgery she denies any history of heart disease denies chest pain or shortness of breath with activity. She's never had any known coronary events.    2. Chronic pain of left knee  Knee showing meniscal issues as well as arthritis again planning to have surgery in the near future.    3. Memory loss  She is having persistent episodes of memory loss possibly related to dementia she is still in the midst of following up with neurology.    4. Dyslipidemia  Maintain on statin without myalgias coming due for recheck.    5. Acquired hypothyroidism  On thyroid medications due for recheck    6. IGT (impaired glucose tolerance)  Blood sugar slightly elevated in the past never been diabetic again due for recheck.      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 90 Cap 4   • furosemide (LASIX) 20 MG Tab TAKE ONE TABLET BY MOUTH ONCE DAILY 90 Tab 3   • cyanocobalamin (VITAMIN B-12) 500 MCG Tab Take 1,000 mcg by mouth every day.     • levothyroxine (SYNTHROID) 88 MCG Tab Take 0.5 Tabs by mouth Every morning on an empty stomach. 45 Tab 11   • simvastatin (ZOCOR) 40 MG Tab TAKE ONE TABLET  BY MOUTH IN THE EVENING 90 Tab 3   • potassium chloride ER (KLOR-CON) 10 MEQ tablet TAKE ONE TABLET BY MOUTH TWICE DAILY (Patient taking differently: 10 mEq every day. TAKE ONE TABLET BY MOUTH TWICE DAILY) 180 Tab 3   • metoprolol SR (TOPROL XL) 50 MG TABLET SR 24 HR Take 1 Tab by mouth every day. 90 Tab 3   • Diclofenac Sodium (VOLTAREN) 1 % Gel Apply 1 Inch to skin as directed 4 times a day. (Patient not taking: Reported on 11/13/2017) 5 Tube 3   • aspirin EC (ECOTRIN) 81 MG TBEC Take 81 mg by mouth every day.     • VITAMIN D, CHOLECALCIFEROL, PO Take 2,000 Units by mouth every day.       No current facility-administered medications for this visit.          Allergies as of 03/23/2018 - Reviewed 03/23/2018   Allergen Reaction Noted   • Demerol Vomiting 11/14/2011   • Nsaids  07/05/2011        ROS: Denies Chest pain, SOB, LE edema.    /84   Pulse (!) 101   Temp 36.7 °C (98.1 °F)   Resp 16   Ht 1.524 m (5')   Wt 67.6 kg (149 lb)   SpO2 97%   BMI 29.10 kg/m²      Physical Exam:  Gen:         Alert and oriented, No apparent distress.  Neck:        No Lymphadenopathy or Bruits.  Lungs:     Clear to auscultation bilaterally  CV:          Regular rate and rhythm. No murmurs, rubs or gallops.               Ext:          No clubbing, cyanosis, edema.    EKG:  Normal sinus rythm, no acute st-t wave changes.    Assessment and Plan.   77 y.o. female with the following issues.    1. Preop cardiovascular exam  Patient is cleared for procedure with average risk for age.  - EKG    2. Chronic pain of left knee  And will follow along special see back after surgery.  - EKG    3. Memory loss  Following with neurology    4. Dyslipidemia  Continue statin recheck cholesterol  - COMP METABOLIC PANEL; Future  - LIPID PROFILE; Future    5. Acquired hypothyroidism  Recheck thyroid  - TSH; Future    6. IGT (impaired glucose tolerance)  Discussion about diet and exercise weight loss.  - HEMOGLOBIN A1C; Future

## 2018-03-27 ENCOUNTER — OFFICE VISIT (OUTPATIENT)
Dept: NEUROLOGY | Facility: MEDICAL CENTER | Age: 78
End: 2018-03-27
Payer: MEDICARE

## 2018-03-27 VITALS
DIASTOLIC BLOOD PRESSURE: 82 MMHG | HEART RATE: 88 BPM | WEIGHT: 150.4 LBS | BODY MASS INDEX: 29.53 KG/M2 | OXYGEN SATURATION: 96 % | RESPIRATION RATE: 16 BRPM | SYSTOLIC BLOOD PRESSURE: 120 MMHG | TEMPERATURE: 97.1 F | HEIGHT: 60 IN

## 2018-03-27 DIAGNOSIS — F09 MILD COGNITIVE DISORDER: ICD-10-CM

## 2018-03-27 DIAGNOSIS — E56.9 VITAMIN DEFICIENCY: ICD-10-CM

## 2018-03-27 PROCEDURE — 99214 OFFICE O/P EST MOD 30 MIN: CPT | Performed by: PSYCHIATRY & NEUROLOGY

## 2018-03-27 RX ORDER — MEMANTINE HYDROCHLORIDE 5 MG/1
5 TABLET ORAL 2 TIMES DAILY
Qty: 60 TAB | Refills: 3 | Status: SHIPPED | OUTPATIENT
Start: 2018-03-27 | End: 2018-08-21 | Stop reason: SDUPTHER

## 2018-03-27 NOTE — PROGRESS NOTES
NEUROLOGY NOTE    Referring Physician  Hector Neville M.D.      CHIEF COMPLAINT:  July 1st --she lost the memory for that day  Her daughter's marriage date ( July 1st 2017)  Chief Complaint   Patient presents with   • Follow-Up     Transient global amnesia       PRESENT ILLNESS:   July 1st --she lost the memory for that day  Her daughter's marriage date ( July 1st 2017)      Since last visit, the patient noticed a couple of events that she had memory problems  Like she could not recall what she did in one morning-- like watering plants etc  At times , she had trouble findings words-- her  has hearing problem          PAST MEDICAL HISTORY:  Past Medical History:   Diagnosis Date   • Arrhythmia     PAT, last episode 2009; cardiologist, Dr. Hearn   • Cancer ([facility])     squamous cell skin   • Chronic pain of left knee 6/8/2016   • Dental disorder     implants   • Heart burn    • Hiatus hernia syndrome    • Hyperlipidemia 7/5/2011   • Hypertension 7/5/2011   • Indigestion    • Kidney stones    • Pneumonia    • Psychiatric problem     depression   • Renal disorder 2009    stones   • Unspecified urinary incontinence    • Urinary bladder disorder        PAST SURGICAL HISTORY:  Past Surgical History:   Procedure Laterality Date   • COLPOSACROPEXY ROBOTIC  11/14/2011    Performed by [facility] at SURGERY [facility] ORS   • CYSTOSCOPY  11/14/2011    Performed by [facility] at SURGERY [facility] ORS   • HYSTERECTOMY, TOTAL ABDOMINAL  1989   • CHOLECYSTECTOMY  1971   • ABDOMINAL HYSTERECTOMY TOTAL     • GYN SURGERY      bladder slings x2       FAMILY HISTORY:  Family History   Problem Relation Age of Onset   • Lung Disease Father    • Cancer Mother    • Arrythmia Sister        SOCIAL HISTORY:  Social History     Social History   • Marital status:      Spouse name: N/A   • Number of children: N/A   • Years of education: N/A     Occupational History   • Not on file.     Social History Main  Topics   • Smoking status: Former Smoker     Packs/day: 1.00     Years: 12.00     Quit date: 1/1/1975   • Smokeless tobacco: Never Used   • Alcohol use No   • Drug use: No   • Sexual activity: Not Currently     Other Topics Concern   • Not on file     Social History Narrative   • No narrative on file     ALLERGIES:  Allergies   Allergen Reactions   • Demerol Vomiting   • Nsaids      swelling     TOBHX  History   Smoking Status   • Former Smoker   • Packs/day: 1.00   • Years: 12.00   • Quit date: 1/1/1975   Smokeless Tobacco   • Never Used     ALCHX  History   Alcohol Use No     DRUGHX  History   Drug Use No           MEDICATIONS:  Current Outpatient Prescriptions   Medication   • omeprazole (PRILOSEC) 20 MG delayed-release capsule   • furosemide (LASIX) 20 MG Tab   • cyanocobalamin (VITAMIN B-12) 500 MCG Tab   • levothyroxine (SYNTHROID) 88 MCG Tab   • simvastatin (ZOCOR) 40 MG Tab   • potassium chloride ER (KLOR-CON) 10 MEQ tablet   • metoprolol SR (TOPROL XL) 50 MG TABLET SR 24 HR   • aspirin EC (ECOTRIN) 81 MG TBEC   • VITAMIN D, CHOLECALCIFEROL, PO   • Diclofenac Sodium (VOLTAREN) 1 % Gel     No current facility-administered medications for this visit.        REVIEW OF SYSTEM:    Constitutional: Denies fevers, Denies weight changes   Eyes: Denies changes in vision, no eye pain   Ears/Nose/Throat/Mouth: Denies nasal congestion or sore throat   Cardiovascular: Denies chest pain or palpitations   Respiratory: Denies SOB.   Gastrointestinal/Hepatic: Denies abdominal pain, nausea, vomiting, diarrhea, constipation or GI bleeding   Genitourinary: Denies bladder dysfunction, dysuria or frequency   Musculoskeletal/Rheum: Denies joint pain and swelling   Skin/Breast: Denies rash, denies breast lumps or discharge   Neurological: memory problems on July 1st 2017  Psychiatric: denies mood disorder   Endocrine: denies hx of diabetes or thyroid dysfunction   Heme/Oncology/Lymph Nodes: Denies enlarged lymph nodes, denies  brusing or known bleeding disorder   Allergic/Immunologic: Denies hx of allergies         PHYSICAL AND NEUROLOGICAL EXMAINATIONS:  VITAL SIGNS: /82   Pulse 88   Temp 36.2 °C (97.1 °F)   Resp 16   Ht 1.524 m (5')   Wt 68.2 kg (150 lb 6.4 oz)   SpO2 96%   BMI 29.37 kg/m²   CURRENT WEIGHT: BMI: Body mass index is 29.37 kg/m².  PREVIOUS WEIGHTS:  Wt Readings from Last 25 Encounters:   03/27/18 68.2 kg (150 lb 6.4 oz)   03/23/18 67.6 kg (149 lb)   11/13/17 67.2 kg (148 lb 1.6 oz)   07/18/17 67.9 kg (149 lb 9.6 oz)   04/18/17 66.2 kg (145 lb 15.1 oz)   03/12/17 67 kg (147 lb 11.3 oz)   01/04/17 66.7 kg (147 lb)   08/29/16 68 kg (150 lb)   06/27/16 68.9 kg (152 lb)   06/08/16 70.8 kg (156 lb)   08/04/15 68.9 kg (152 lb)   04/17/15 68.9 kg (152 lb)   01/22/15 68.5 kg (151 lb)   12/15/14 67.6 kg (149 lb)   11/18/14 68 kg (150 lb)   10/17/14 69.7 kg (153 lb 9.6 oz)   09/11/14 72.1 kg (159 lb)   05/08/14 73.5 kg (162 lb)   10/21/13 68.9 kg (152 lb)   08/08/13 68 kg (150 lb)   07/25/13 70.3 kg (155 lb)   10/18/11 69.9 kg (154 lb)   07/05/11 64.4 kg (142 lb)       General appearance of patient: WDWN(+) NAD(+)    EYES  o Fundus : Papilledem(-) Exudates(-) Hemorrhage(-)  Nervous System  Orientation to time, place and person(+)  Memory-- one day of memory problem on the day of her daughter's   Language: aphasia(-)  Knowledge: past(+) Current(+)  Attention(+)  Cranial Nerves  • Nerve 2: intact  • Nerve 3,4,6: intact  • Nerve 5 : intact  • Nerve 7: intact  • Nerve 8: intact  • Nerve 9 & 10: intact  • Nerve 11: intact  • Nerve 12: intact  Muscle Power and muscle tone: symmetric, normal in upper and lower  Sensory System: Pin sensation intact(+)  Reflexes: symmetric throughout  Cerebellar Function FNP normal   Gait : Steady(+) TandemGait steady(+)  Heart and Vascular  Peripheral Vasucular system : Edema (-) Swelling(-)  RHB, Breathing sound clear  abdomen bowel sound normoactive  Extremities freely moveable  Joints no  contracture       NEUROIMAGING: I reviewed the MRI/CT of brain     Grandmother had dementia-- started at the age of 80+  Aunt developed dementia at the age of 80+  Mother  at the age of 80+ due to cancer      Registration 3/3  Recall 1/3    IMPRESSION:    1. One episode of  Memory loss at 2017--- she could not recall what happened afterwards  2. Hx of brain concussion   3. Hx of kidney stones, Hypothyrodism, HTN  4. Mild cognitive decline since 2016 ( family hx of Dementia)    PLAN/RECOMMENDATIONS:      Explained to the patient that in order to make the diagnosis of TGA ( transient global amnesia)    We need the following information from the witness    1. People with TGA tends to ask the same questions again and again during that TGA spell  2. The patient should not have shaking and head injury before this TGA event ( the rationale: the exclude the post-ictal amnesia and posttraumatic amnesia)    Please talk to the witness and call us regarding the contents of conversation the patient had with the witness and her family during that wedding ceremony at 2017       This time, we will add Namenda 5mg two times per day for memory problems  Please stop if any side effects  Will also get some blood tests  We will see Emily in 4 months      Regarding the dementia prevention-- We would advise the following  ________________________________________________________________________        Exercise muscle and brain    Weight watch    Quit alcohol altogether    Could try fasting 12 hours every other day    Fasting is a challenge to your brain, and we think that your brain reacts by activating adaptive stress responses that help it cope with disease.    Reduce anxiety by praying, yoga, meditation and etc    Eat healthy    ________________________________________________________________________    ________________________________________________________________________      Fasting is a challenge to your  brain, and we think that your brain reacts by activating adaptive stress responses that help it cope with disease.  .  http://www.[employer].com/issues/spring-summer-2016/articles/are-there-any-proven-benefits-to-fasting    ________________________________________________________________________            SIGNATURE:  Bunny Sorto    CC:  Hector Neville M.D.  ROUTINE ELECTROENCEPHALOGRAM REPORT          INTERPRETATION:      ________________________________________________________________________    This scalp EEG is consistent with mild focal cortical dysfunction over the left.     This remains nonspecific.     If clinical suspicion of seizure remains high.  Prolonged  EEG   monitoring may be of help.    ________________________________________________________________________                        7/2017  1. MRI OF THE BRAIN WITHOUT CONTRAST WITHIN NORMAL LIMITS FOR AGE WITH MILD ATROPHY AND MILD WHITE MATTER CHANGES.

## 2018-03-27 NOTE — PATIENT INSTRUCTIONS
Registration 3/3  Recall 1/3    IMPRESSION:    1. One episode of  Memory loss at July 1st 2017--- she could not recall what happened afterwards  2. Hx of brain concussion   3. Hx of kidney stones, Hypothyrodism, HTN  4. Mild cognitive decline since 2016 ( family hx of Dementia)    PLAN/RECOMMENDATIONS:      Explained to the patient that in order to make the diagnosis of TGA ( transient global amnesia)    We need the following information from the witness    1. People with TGA tends to ask the same questions again and again during that TGA spell  2. The patient should not have shaking and head injury before this TGA event ( the rationale: the exclude the post-ictal amnesia and posttraumatic amnesia)    Please talk to the witness and call us regarding the contents of conversation the patient had with the witness and her family during that wedding ceremony at July 1st 2017       This time, we will add Namenda 5mg two times per day for memory problems  Please stop if any side effects  Will also get some blood tests  We will see Emily in 4 months      Regarding the dementia prevention-- We would advise the following  ________________________________________________________________________        Exercise muscle and brain    Weight watch    Quit alcohol altogether    Could try fasting 12 hours every other day    Fasting is a challenge to your brain, and we think that your brain reacts by activating adaptive stress responses that help it cope with disease.    Reduce anxiety by praying, yoga, meditation and etc    Eat healthy    ________________________________________________________________________    ________________________________________________________________________      Fasting is a challenge to your brain, and we think that your brain reacts by activating adaptive stress responses that help it cope with  disease.  .  http://[email or web address]/issues/spring-summer-2016/articles/are-there-any-proven-benefits-to-fasting    ________________________________________________________________________

## 2018-04-18 NOTE — PROGRESS NOTES
1. Attempt #: 2    2. [facility] Verified: yes    3. Verify PCP: yes    4. Care Team Updated:       •   DME Company (gait device, O2, CPAP, etc.): YES       •   Other Specialists (eye doctor, derm, GYN, cardiology, endo, etc): YES    5.  Reviewed/Updated the following with patient:       •   Communication Preference Obtained? YES       •   Preferred Pharmacy? YES       •   Preferred Lab? YES       •   Family History (document living status of immediate family members and if + hx of cancer, diabetes, hypertension, hyperlipidemia, heart attack, stroke) YES. Was Abstract Encounter opened and chart updated? YES    6. Ask The Doctor Activation: already active    7. Ask The Doctor Catherine: no    8. Annual Wellness Visit Scheduling  Scheduling Status:Scheduled      9. Care Gap Scheduling (Attempt to Schedule EACH Overdue Care Gap!)     There are no preventive care reminders to display for this patient.     Scheduled patient for Annual Wellness Visit    10. Patient was advised: “This is a free wellness visit. The provider will screen for medical conditions to help you stay healthy. If you have other concerns to address you may be asked to discuss these at a separate visit or there may be an additional fee.”     11. Patient was informed to arrive 15 min prior to their scheduled appointment and bring in their medication bottles.

## 2018-04-19 DIAGNOSIS — Z01.812 PRE-OPERATIVE LABORATORY EXAMINATION: ICD-10-CM

## 2018-04-19 DIAGNOSIS — Z01.810 PRE-OPERATIVE CARDIOVASCULAR EXAMINATION: ICD-10-CM

## 2018-04-19 LAB
ANION GAP SERPL CALC-SCNC: 10 MMOL/L (ref 0–11.9)
APTT PPP: 31.1 SEC (ref 24.7–36)
BASOPHILS # BLD AUTO: 0.5 % (ref 0–1.8)
BASOPHILS # BLD: 0.04 K/UL (ref 0–0.12)
BUN SERPL-MCNC: 17 MG/DL (ref 8–22)
CALCIUM SERPL-MCNC: 10.5 MG/DL (ref 8.5–10.5)
CHLORIDE SERPL-SCNC: 103 MMOL/L (ref 96–112)
CO2 SERPL-SCNC: 30 MMOL/L (ref 20–33)
CREAT SERPL-MCNC: 1.11 MG/DL (ref 0.5–1.4)
EKG IMPRESSION: NORMAL
EOSINOPHIL # BLD AUTO: 0.1 K/UL (ref 0–0.51)
EOSINOPHIL NFR BLD: 1.3 % (ref 0–6.9)
ERYTHROCYTE [DISTWIDTH] IN BLOOD BY AUTOMATED COUNT: 50 FL (ref 35.9–50)
GLUCOSE SERPL-MCNC: 85 MG/DL (ref 65–99)
HCT VFR BLD AUTO: 45.2 % (ref 37–47)
HGB BLD-MCNC: 15.4 G/DL (ref 12–16)
HIV 1+2 AB+HIV1 P24 AG SERPL QL IA: NON REACTIVE
IMM GRANULOCYTES # BLD AUTO: 0.04 K/UL (ref 0–0.11)
IMM GRANULOCYTES NFR BLD AUTO: 0.5 % (ref 0–0.9)
INR PPP: 0.95 (ref 0.87–1.13)
LYMPHOCYTES # BLD AUTO: 1.92 K/UL (ref 1–4.8)
LYMPHOCYTES NFR BLD: 24.4 % (ref 22–41)
MCH RBC QN AUTO: 35.5 PG (ref 27–33)
MCHC RBC AUTO-ENTMCNC: 34.1 G/DL (ref 33.6–35)
MCV RBC AUTO: 104.1 FL (ref 81.4–97.8)
MONOCYTES # BLD AUTO: 0.57 K/UL (ref 0–0.85)
MONOCYTES NFR BLD AUTO: 7.3 % (ref 0–13.4)
NEUTROPHILS # BLD AUTO: 5.19 K/UL (ref 2–7.15)
NEUTROPHILS NFR BLD: 66 % (ref 44–72)
NRBC # BLD AUTO: 0 K/UL
NRBC BLD-RTO: 0 /100 WBC
PLATELET # BLD AUTO: 290 K/UL (ref 164–446)
PMV BLD AUTO: 9.7 FL (ref 9–12.9)
POTASSIUM SERPL-SCNC: 3.8 MMOL/L (ref 3.6–5.5)
PROTHROMBIN TIME: 12.4 SEC (ref 12–14.6)
RBC # BLD AUTO: 4.34 M/UL (ref 4.2–5.4)
SCCMEC + MECA PNL NOSE NAA+PROBE: NEGATIVE
SCCMEC + MECA PNL NOSE NAA+PROBE: NEGATIVE
SODIUM SERPL-SCNC: 143 MMOL/L (ref 135–145)
WBC # BLD AUTO: 7.9 K/UL (ref 4.8–10.8)

## 2018-04-19 PROCEDURE — 83036 HEMOGLOBIN GLYCOSYLATED A1C: CPT

## 2018-04-19 PROCEDURE — 85025 COMPLETE CBC W/AUTO DIFF WBC: CPT

## 2018-04-19 PROCEDURE — 85610 PROTHROMBIN TIME: CPT

## 2018-04-19 PROCEDURE — 93010 ELECTROCARDIOGRAM REPORT: CPT | Performed by: INTERNAL MEDICINE

## 2018-04-19 PROCEDURE — 87641 MR-STAPH DNA AMP PROBE: CPT

## 2018-04-19 PROCEDURE — 93005 ELECTROCARDIOGRAM TRACING: CPT

## 2018-04-19 PROCEDURE — 85730 THROMBOPLASTIN TIME PARTIAL: CPT

## 2018-04-19 PROCEDURE — 36415 COLL VENOUS BLD VENIPUNCTURE: CPT

## 2018-04-19 PROCEDURE — 80048 BASIC METABOLIC PNL TOTAL CA: CPT

## 2018-04-19 PROCEDURE — 87389 HIV-1 AG W/HIV-1&-2 AB AG IA: CPT

## 2018-04-19 PROCEDURE — 87640 STAPH A DNA AMP PROBE: CPT

## 2018-04-19 RX ORDER — LANOLIN ALCOHOL/MO/W.PET/CERES
1000 CREAM (GRAM) TOPICAL DAILY
COMMUNITY

## 2018-04-19 NOTE — OR NURSING
"Preadmit appointment: \" Preparing for your Procedure information\" sheet given to patient with verbal and written instructions. Patient instructed to continue prescribed medications through the day before surgery, instructed to take the following medications the day of surgery per anesthesia protocol: Levothyroxine, Metoprolol, Omeprazole,   "

## 2018-04-19 NOTE — DISCHARGE PLANNING
DISCHARGE PLANNING NOTE - TOTAL JOINT     Procedure: Procedure(s):  KNEE ARTHROPLASTY TOTAL  Procedure Date: 4/25/2018  Insurance:  Payor: [employer] / Plan: [employer]US / Product Type: *No Product type* /   Equipment currently available at home? cane, front-wheel walker, raised toilet seat and shower chair  Steps into the home? 2  Steps within the home? 1  Toilet height? Standard  Type of shower? tub-shower  Who will be with you during your recovery? spouse  Is Outpatient Physical Therapy set up after surgery? Yes   Did you take the Total Joint Class and where? Yes, at [facility]     Plan:

## 2018-04-20 LAB
EST. AVERAGE GLUCOSE BLD GHB EST-MCNC: 126 MG/DL
HBA1C MFR BLD: 6 % (ref 0–5.6)

## 2018-04-25 ENCOUNTER — HOSPITAL ENCOUNTER (OUTPATIENT)
Facility: MEDICAL CENTER | Age: 78
End: 2018-04-26
Attending: ORTHOPAEDIC SURGERY | Admitting: ORTHOPAEDIC SURGERY
Payer: MEDICARE

## 2018-04-25 ENCOUNTER — APPOINTMENT (OUTPATIENT)
Dept: RADIOLOGY | Facility: MEDICAL CENTER | Age: 78
End: 2018-04-25
Attending: PHYSICIAN ASSISTANT
Payer: MEDICARE

## 2018-04-25 DIAGNOSIS — G89.18 POST-OP PAIN: ICD-10-CM

## 2018-04-25 DIAGNOSIS — M17.12 PRIMARY OSTEOARTHRITIS OF LEFT KNEE: ICD-10-CM

## 2018-04-25 PROCEDURE — 160041 HCHG SURGERY MINUTES - EA ADDL 1 MIN LEVEL 4: Performed by: ORTHOPAEDIC SURGERY

## 2018-04-25 PROCEDURE — 160029 HCHG SURGERY MINUTES - 1ST 30 MINS LEVEL 4: Performed by: ORTHOPAEDIC SURGERY

## 2018-04-25 PROCEDURE — 700105 HCHG RX REV CODE 258: Performed by: ORTHOPAEDIC SURGERY

## 2018-04-25 PROCEDURE — A9270 NON-COVERED ITEM OR SERVICE: HCPCS | Performed by: PHYSICIAN ASSISTANT

## 2018-04-25 PROCEDURE — 700105 HCHG RX REV CODE 258: Performed by: PHYSICIAN ASSISTANT

## 2018-04-25 PROCEDURE — 502000 HCHG MISC OR IMPLANTS RC 0278: Performed by: ORTHOPAEDIC SURGERY

## 2018-04-25 PROCEDURE — 160035 HCHG PACU - 1ST 60 MINS PHASE I: Performed by: ORTHOPAEDIC SURGERY

## 2018-04-25 PROCEDURE — 502579 HCHG PACK, TOTAL KNEE: Performed by: ORTHOPAEDIC SURGERY

## 2018-04-25 PROCEDURE — 700112 HCHG RX REV CODE 229: Performed by: PHYSICIAN ASSISTANT

## 2018-04-25 PROCEDURE — 700101 HCHG RX REV CODE 250: Performed by: PHYSICIAN ASSISTANT

## 2018-04-25 PROCEDURE — 700101 HCHG RX REV CODE 250

## 2018-04-25 PROCEDURE — 160048 HCHG OR STATISTICAL LEVEL 1-5: Performed by: ORTHOPAEDIC SURGERY

## 2018-04-25 PROCEDURE — 73560 X-RAY EXAM OF KNEE 1 OR 2: CPT | Mod: LT

## 2018-04-25 PROCEDURE — 160036 HCHG PACU - EA ADDL 30 MINS PHASE I: Performed by: ORTHOPAEDIC SURGERY

## 2018-04-25 PROCEDURE — 160002 HCHG RECOVERY MINUTES (STAT): Performed by: ORTHOPAEDIC SURGERY

## 2018-04-25 PROCEDURE — 700111 HCHG RX REV CODE 636 W/ 250 OVERRIDE (IP): Performed by: PHYSICIAN ASSISTANT

## 2018-04-25 PROCEDURE — 160009 HCHG ANES TIME/MIN: Performed by: ORTHOPAEDIC SURGERY

## 2018-04-25 PROCEDURE — 700102 HCHG RX REV CODE 250 W/ 637 OVERRIDE(OP)

## 2018-04-25 PROCEDURE — 160022 HCHG BLOCK: Performed by: ORTHOPAEDIC SURGERY

## 2018-04-25 PROCEDURE — 700111 HCHG RX REV CODE 636 W/ 250 OVERRIDE (IP)

## 2018-04-25 PROCEDURE — 96374 THER/PROPH/DIAG INJ IV PUSH: CPT

## 2018-04-25 PROCEDURE — 501838 HCHG SUTURE GENERAL: Performed by: ORTHOPAEDIC SURGERY

## 2018-04-25 PROCEDURE — G0378 HOSPITAL OBSERVATION PER HR: HCPCS

## 2018-04-25 PROCEDURE — 700102 HCHG RX REV CODE 250 W/ 637 OVERRIDE(OP): Performed by: PHYSICIAN ASSISTANT

## 2018-04-25 PROCEDURE — L8699 PROSTHETIC IMPLANT NOS: HCPCS | Performed by: ORTHOPAEDIC SURGERY

## 2018-04-25 PROCEDURE — A9270 NON-COVERED ITEM OR SERVICE: HCPCS

## 2018-04-25 DEVICE — IMPLANT GNS II CMT TIB SIZE  3 LEFT: Type: IMPLANTABLE DEVICE | Site: KNEE | Status: FUNCTIONAL

## 2018-04-25 DEVICE — IMPLANTABLE DEVICE: Type: IMPLANTABLE DEVICE | Site: KNEE | Status: FUNCTIONAL

## 2018-04-25 DEVICE — IMPLANT LGN PS HIGH FLEX XLPE SZ 3-4 9MM (1EA): Type: IMPLANTABLE DEVICE | Site: KNEE | Status: FUNCTIONAL

## 2018-04-25 DEVICE — IMPLANT GII OVAL RESURFACING PAT 29MM (1EA): Type: IMPLANTABLE DEVICE | Site: KNEE | Status: FUNCTIONAL

## 2018-04-25 DEVICE — CEMENT ORTHOPEDIC HV US  (10/PK): Type: IMPLANTABLE DEVICE | Site: KNEE | Status: FUNCTIONAL

## 2018-04-25 RX ORDER — ACETAMINOPHEN 500 MG
TABLET ORAL
Status: COMPLETED
Start: 2018-04-25 | End: 2018-04-25

## 2018-04-25 RX ORDER — DIPHENHYDRAMINE HCL 25 MG
25 TABLET ORAL NIGHTLY PRN
Status: DISCONTINUED | OUTPATIENT
Start: 2018-04-26 | End: 2018-04-26 | Stop reason: HOSPADM

## 2018-04-25 RX ORDER — AMOXICILLIN 250 MG
1 CAPSULE ORAL NIGHTLY
Status: DISCONTINUED | OUTPATIENT
Start: 2018-04-25 | End: 2018-04-26 | Stop reason: HOSPADM

## 2018-04-25 RX ORDER — TRAMADOL HYDROCHLORIDE 50 MG/1
50 TABLET ORAL EVERY 4 HOURS PRN
Status: DISCONTINUED | OUTPATIENT
Start: 2018-04-25 | End: 2018-04-26 | Stop reason: HOSPADM

## 2018-04-25 RX ORDER — ONDANSETRON 4 MG/1
4 TABLET, ORALLY DISINTEGRATING ORAL EVERY 4 HOURS PRN
Status: DISCONTINUED | OUTPATIENT
Start: 2018-04-25 | End: 2018-04-26 | Stop reason: HOSPADM

## 2018-04-25 RX ORDER — OXYCODONE HYDROCHLORIDE 5 MG/1
5 TABLET ORAL
Status: DISCONTINUED | OUTPATIENT
Start: 2018-04-25 | End: 2018-04-26 | Stop reason: HOSPADM

## 2018-04-25 RX ORDER — DIPHENHYDRAMINE HYDROCHLORIDE 50 MG/ML
25 INJECTION INTRAMUSCULAR; INTRAVENOUS EVERY 6 HOURS PRN
Status: DISCONTINUED | OUTPATIENT
Start: 2018-04-25 | End: 2018-04-26 | Stop reason: HOSPADM

## 2018-04-25 RX ORDER — METOPROLOL SUCCINATE 25 MG/1
50 TABLET, EXTENDED RELEASE ORAL DAILY
Status: DISCONTINUED | OUTPATIENT
Start: 2018-04-26 | End: 2018-04-26 | Stop reason: HOSPADM

## 2018-04-25 RX ORDER — DIAZEPAM 5 MG/1
5 TABLET ORAL EVERY 6 HOURS PRN
Status: DISCONTINUED | OUTPATIENT
Start: 2018-04-25 | End: 2018-04-26 | Stop reason: HOSPADM

## 2018-04-25 RX ORDER — AMOXICILLIN 250 MG
1 CAPSULE ORAL
Status: DISCONTINUED | OUTPATIENT
Start: 2018-04-25 | End: 2018-04-26 | Stop reason: HOSPADM

## 2018-04-25 RX ORDER — TRANEXAMIC ACID
POWDER (GRAM) MISCELLANEOUS
Status: DISCONTINUED | OUTPATIENT
Start: 2018-04-25 | End: 2018-04-25 | Stop reason: HOSPADM

## 2018-04-25 RX ORDER — DEXAMETHASONE SODIUM PHOSPHATE 4 MG/ML
4 INJECTION, SOLUTION INTRA-ARTICULAR; INTRALESIONAL; INTRAMUSCULAR; INTRAVENOUS; SOFT TISSUE
Status: DISCONTINUED | OUTPATIENT
Start: 2018-04-25 | End: 2018-04-26 | Stop reason: HOSPADM

## 2018-04-25 RX ORDER — DEXTROSE MONOHYDRATE, SODIUM CHLORIDE, AND POTASSIUM CHLORIDE 50; 1.49; 4.5 G/1000ML; G/1000ML; G/1000ML
INJECTION, SOLUTION INTRAVENOUS CONTINUOUS
Status: DISCONTINUED | OUTPATIENT
Start: 2018-04-25 | End: 2018-04-26 | Stop reason: HOSPADM

## 2018-04-25 RX ORDER — HYDROMORPHONE HYDROCHLORIDE 2 MG/ML
0.5 INJECTION, SOLUTION INTRAMUSCULAR; INTRAVENOUS; SUBCUTANEOUS
Status: DISCONTINUED | OUTPATIENT
Start: 2018-04-25 | End: 2018-04-26 | Stop reason: HOSPADM

## 2018-04-25 RX ORDER — ROPIVACAINE HYDROCHLORIDE 5 MG/ML
INJECTION, SOLUTION EPIDURAL; INFILTRATION; PERINEURAL
Status: DISCONTINUED | OUTPATIENT
Start: 2018-04-25 | End: 2018-04-25 | Stop reason: HOSPADM

## 2018-04-25 RX ORDER — SODIUM CHLORIDE, SODIUM LACTATE, POTASSIUM CHLORIDE, CALCIUM CHLORIDE 600; 310; 30; 20 MG/100ML; MG/100ML; MG/100ML; MG/100ML
INJECTION, SOLUTION INTRAVENOUS CONTINUOUS
Status: DISCONTINUED | OUTPATIENT
Start: 2018-04-25 | End: 2018-04-26 | Stop reason: HOSPADM

## 2018-04-25 RX ORDER — TAMSULOSIN HYDROCHLORIDE 0.4 MG/1
0.4 CAPSULE ORAL
Status: DISCONTINUED | OUTPATIENT
Start: 2018-04-25 | End: 2018-04-26 | Stop reason: HOSPADM

## 2018-04-25 RX ORDER — POLYETHYLENE GLYCOL 3350 17 G/17G
1 POWDER, FOR SOLUTION ORAL 2 TIMES DAILY PRN
Status: DISCONTINUED | OUTPATIENT
Start: 2018-04-25 | End: 2018-04-26 | Stop reason: HOSPADM

## 2018-04-25 RX ORDER — SCOLOPAMINE TRANSDERMAL SYSTEM 1 MG/1
1 PATCH, EXTENDED RELEASE TRANSDERMAL
Status: DISCONTINUED | OUTPATIENT
Start: 2018-04-25 | End: 2018-04-26 | Stop reason: HOSPADM

## 2018-04-25 RX ORDER — CELECOXIB 200 MG/1
CAPSULE ORAL
Status: DISPENSED
Start: 2018-04-25 | End: 2018-04-25

## 2018-04-25 RX ORDER — ONDANSETRON 2 MG/ML
4 INJECTION INTRAMUSCULAR; INTRAVENOUS EVERY 4 HOURS PRN
Status: DISCONTINUED | OUTPATIENT
Start: 2018-04-25 | End: 2018-04-26 | Stop reason: HOSPADM

## 2018-04-25 RX ORDER — LEVOTHYROXINE SODIUM 88 UG/1
44 TABLET ORAL
Status: DISCONTINUED | OUTPATIENT
Start: 2018-04-26 | End: 2018-04-26 | Stop reason: HOSPADM

## 2018-04-25 RX ORDER — ENEMA 19; 7 G/133ML; G/133ML
1 ENEMA RECTAL
Status: DISCONTINUED | OUTPATIENT
Start: 2018-04-25 | End: 2018-04-26 | Stop reason: HOSPADM

## 2018-04-25 RX ORDER — GABAPENTIN 300 MG/1
CAPSULE ORAL
Status: COMPLETED
Start: 2018-04-25 | End: 2018-04-25

## 2018-04-25 RX ORDER — DEXAMETHASONE SODIUM PHOSPHATE 4 MG/ML
8 INJECTION, SOLUTION INTRA-ARTICULAR; INTRALESIONAL; INTRAMUSCULAR; INTRAVENOUS; SOFT TISSUE ONCE
Status: COMPLETED | OUTPATIENT
Start: 2018-04-26 | End: 2018-04-26

## 2018-04-25 RX ORDER — DOCUSATE SODIUM 100 MG/1
100 CAPSULE, LIQUID FILLED ORAL 2 TIMES DAILY
Status: DISCONTINUED | OUTPATIENT
Start: 2018-04-25 | End: 2018-04-26 | Stop reason: HOSPADM

## 2018-04-25 RX ORDER — OXYCODONE HYDROCHLORIDE 10 MG/1
10 TABLET ORAL
Status: DISCONTINUED | OUTPATIENT
Start: 2018-04-25 | End: 2018-04-26 | Stop reason: HOSPADM

## 2018-04-25 RX ORDER — BISACODYL 10 MG
10 SUPPOSITORY, RECTAL RECTAL
Status: DISCONTINUED | OUTPATIENT
Start: 2018-04-25 | End: 2018-04-26 | Stop reason: HOSPADM

## 2018-04-25 RX ORDER — SIMVASTATIN 20 MG
40 TABLET ORAL EVERY EVENING
Status: DISCONTINUED | OUTPATIENT
Start: 2018-04-25 | End: 2018-04-26 | Stop reason: HOSPADM

## 2018-04-25 RX ORDER — OMEPRAZOLE 20 MG/1
20 CAPSULE, DELAYED RELEASE ORAL DAILY
Status: DISCONTINUED | OUTPATIENT
Start: 2018-04-26 | End: 2018-04-26 | Stop reason: HOSPADM

## 2018-04-25 RX ORDER — SODIUM CHLORIDE 9 MG/ML
INJECTION, SOLUTION INTRAVENOUS ONCE
Status: COMPLETED | OUTPATIENT
Start: 2018-04-25 | End: 2018-04-26

## 2018-04-25 RX ORDER — ACETAMINOPHEN 500 MG
750 TABLET ORAL EVERY 6 HOURS
Status: DISCONTINUED | OUTPATIENT
Start: 2018-04-25 | End: 2018-04-26 | Stop reason: HOSPADM

## 2018-04-25 RX ORDER — KETOROLAC TROMETHAMINE 30 MG/ML
INJECTION, SOLUTION INTRAMUSCULAR; INTRAVENOUS
Status: DISCONTINUED | OUTPATIENT
Start: 2018-04-25 | End: 2018-04-25 | Stop reason: HOSPADM

## 2018-04-25 RX ORDER — EPINEPHRINE 1 MG/ML(1)
AMPUL (ML) INJECTION
Status: DISCONTINUED | OUTPATIENT
Start: 2018-04-25 | End: 2018-04-25 | Stop reason: HOSPADM

## 2018-04-25 RX ORDER — HALOPERIDOL 5 MG/ML
1 INJECTION INTRAMUSCULAR EVERY 6 HOURS PRN
Status: DISCONTINUED | OUTPATIENT
Start: 2018-04-25 | End: 2018-04-26 | Stop reason: HOSPADM

## 2018-04-25 RX ORDER — TRANEXAMIC ACID 100 MG/ML
INJECTION, SOLUTION INTRAVENOUS
Status: COMPLETED
Start: 2018-04-25 | End: 2018-04-25

## 2018-04-25 RX ORDER — DIPHENHYDRAMINE HCL 25 MG
25 TABLET ORAL EVERY 6 HOURS PRN
Status: DISCONTINUED | OUTPATIENT
Start: 2018-04-25 | End: 2018-04-26 | Stop reason: HOSPADM

## 2018-04-25 RX ORDER — FUROSEMIDE 20 MG/1
20 TABLET ORAL
Status: DISCONTINUED | OUTPATIENT
Start: 2018-04-25 | End: 2018-04-26 | Stop reason: HOSPADM

## 2018-04-25 RX ADMIN — ACETAMINOPHEN 1000 MG: 500 TABLET, COATED ORAL at 10:23

## 2018-04-25 RX ADMIN — ASPIRIN 81 MG: 81 TABLET, COATED ORAL at 21:04

## 2018-04-25 RX ADMIN — SODIUM CHLORIDE: 9 INJECTION, SOLUTION INTRAVENOUS at 23:15

## 2018-04-25 RX ADMIN — ACETAMINOPHEN 750 MG: 500 TABLET ORAL at 23:14

## 2018-04-25 RX ADMIN — POTASSIUM CHLORIDE, DEXTROSE MONOHYDRATE AND SODIUM CHLORIDE: 150; 5; 450 INJECTION, SOLUTION INTRAVENOUS at 16:32

## 2018-04-25 RX ADMIN — DOCUSATE SODIUM 100 MG: 100 CAPSULE, LIQUID FILLED ORAL at 21:04

## 2018-04-25 RX ADMIN — ACETAMINOPHEN 750 MG: 500 TABLET ORAL at 16:32

## 2018-04-25 RX ADMIN — SODIUM CHLORIDE, SODIUM LACTATE, POTASSIUM CHLORIDE, CALCIUM CHLORIDE 1000 ML: 600; 310; 30; 20 INJECTION, SOLUTION INTRAVENOUS at 10:30

## 2018-04-25 RX ADMIN — TRANEXAMIC ACID 1000 MG: 100 INJECTION, SOLUTION INTRAVENOUS at 14:26

## 2018-04-25 RX ADMIN — TAMSULOSIN HYDROCHLORIDE 0.4 MG: 0.4 CAPSULE ORAL at 16:33

## 2018-04-25 RX ADMIN — WATER 2 G: 100 INJECTION, SOLUTION INTRAVENOUS at 21:05

## 2018-04-25 RX ADMIN — GABAPENTIN 300 MG: 300 CAPSULE ORAL at 10:23

## 2018-04-25 RX ADMIN — SIMVASTATIN 40 MG: 20 TABLET, FILM COATED ORAL at 21:04

## 2018-04-25 RX ADMIN — Medication: at 13:15

## 2018-04-25 RX ADMIN — DOCUSATE SODIUM AND SENNOSIDES 1 TABLET: 8.6; 5 TABLET, FILM COATED ORAL at 21:01

## 2018-04-25 ASSESSMENT — PAIN SCALES - GENERAL
PAINLEVEL_OUTOF10: 7
PAINLEVEL_OUTOF10: 0
PAINLEVEL_OUTOF10: ASSUMED PAIN PRESENT
PAINLEVEL_OUTOF10: ASSUMED PAIN PRESENT
PAINLEVEL_OUTOF10: 0

## 2018-04-25 NOTE — OP REPORT
Date of Surgery:  4-25-18  Pre-operative Diagnosis:  left knee arthritis    Post-operative Diagnosis:  left knee arthritis    Procedure:  left knee replacement    Implants used:  A [employer] PS total knee arthroplasty system with the following components  Femur: 4 left Narrow PS oxinium  Tibia: 3 left  Polyethylene: 9 mm PS XLPE  Patella: 29 oval  Fixation: 2 pacakges of simplex HV    Surgeon:  Micaela Judd MD    1st Assistant:   CROW Pike    Anesthesiologist:   JOSÉ Santana    EBL:  minimal    Specimen:  None    Findings:   Severe tricompartmental OA    Indications for procedure:  This patient is a 77 y.o. female with a long standing history of left knee arthritis. The patient had failed conservative therapy including anti-inflammatory medications, activity modifications, injections, physical therapy and assistive devices. She was indicated for a left total knee replacement. The patient expressed an understanding of the risks of pain, bleeding, infection, dislocation, malalignment, need for further surgery, damage to surrounding structures, neurovascular compromise, blood clots, leg-length discrepancy both apparent and actual, anesthetic complications, and serious medical consequences including but not limited to heart attack, stroke or even death. It was explained that the implants are man-made products and thus subject to possible failure.  The patient expressed an understanding and wished to proceed. Specific risks based on the patient's medical history were addressed. A clearance was obtained by the medical physicians and the patient was taken to the operating room on the day of surgery for the above named procedure.     Description of procedure:  The patient was met in the pre-operative holding area. The left knee was marked as the appropriate surgical site with indelible ink. They were taken to the operating room where the anesthesia department started a adductor/general for intraoperative and  "post-operative anesthesia and pain control. The patient was placed on the OR table and a tourniquet was placed high on the operative thigh. All bony prominences were padded appropriately. The left knee was prepped and draped in a standard sterile surgical fashion. A time out procedure was called to verify the side and site of surgery, the proposed surgical procedure, and the administration of pre-operative antibiotics. After successful completion of the time out procedure, our attention was turned to the left knee.  The tourniquet was inflated after exsanguination with an Esmarch bandage. The knee was flexed and a straight incision was made just medial to the midline. The capsule of the knee was cleared and a midvastus arthrotomy was performed. The patella was subluxated laterally and a medial release was performed to properly visualize the posteromedial aspect of the tibial plateau. The knee was extended, the patellar tendon was protected and the infrapatellar fat pad was excised. A lateral release was performed. The patella was turned on its side and held in place with two penetrating towel clips. A free-hand patellar cut was made, the patella was sized and the appropriate lug holes were drilled. The patella was subluxed, the knee was flexed. The tourniquet was released. Hemostasis was obtained. Each hemijoint was cleared of soft tissue. The ACL was removed. The femoral canal was entered with the step drill and the distal femoral cutting guide was pinned in place in 5 degrees of valgus. The distal cut was made and the bony pieces were removed. The femur was sized to a size 4 and the appropriate cutting jig was pinned in place in 3 degrees of external rotation. The bony cuts were made, we noted a \"grand piano sign\" anteriorly. The patient's anatomy was noted to be appropriate for the Narrow component. The box for the PS post was cut in the standard fashion   Now the femur was retracted posteriorly with a lap sponge " to protect the intercondylar area. The proximal tibia was exposed, the depth of our resection was based on taking 2 mm off the low side of the bone. We used the intramedullary drill to enter the tibial canal and set our alignment based on the patient's anatomy.  We first set our depth and then our checked our varus/valgus alignment with the extramedullary guide caleb. We made our bony cuts and removed the tibial piece en bloc. The laminar spreaders were placed and the hypercurved osteotomes were used to remove posterior femoral osteophytes. The ligaments were balanced in flexion and extension.   The tibia was then sized to a size 3 and the trial component was placed in the proper amount of external rotation. A trial femoral component was placed and with the 9 mm PS polyethylene we noted full extension without recurvatum and greater than 125 degrees of flexion with appropriate patellar tracking. At this point we removed the trial components and thoroughly irrigated the wound. Osteophytes were removed. The tourniquet was reinflated.  The real components were opened in a sterile fashion on the back table and then cemented into place sequentially, first the femur, then the tibia, then the patella. The cement was allowed to cure with the trial polyethylene component in place. After the cement had hardened and all excess cement was removed, the knee was taken through a range of motion. Again we noted full extension and greater than 125 degrees of flexion with appropriate varus/valgus stability and patellar tracking. Therefore the real polyethylene was opened and inserted into the tibial tray. An audible click was heard as it engaged the tibia. Now a dilute betadine solution with 3 grams of tranexamic acid was instilled into the knee for 3 minutes before being pulse-lavaged out. The knee was flexed, the arthrotomy was closed with #1 Quill, followed by 2-0 Vicryl in the deep dermal layer in a buried interrupted fashion. The  skin was closed with 3-0 monocryl in a running subcuticular fashion, and a sterile silver impregnated dressing was applied. The patient is currently in the operating room awaiting reversal of anesthesia.

## 2018-04-25 NOTE — FLOWSHEET NOTE
04/25/18 1652   Education   Education Yes - Pt. / Family has been Instructed in use of Respiratory Equipment   Incentive Spirometry Group   Incentive Spirometry Instruction Yes   Incentive Spirometer Volume 2250 mL   Incentive Spirometer Date Last Changed 04/25/18   Incentive Spirometer Next Change Date (Q 30 Days) 05/25/18   Chest Exam   Respiration 16   Pulse 86   Breath Sounds   RUL Breath Sounds Clear   RML Breath Sounds Clear   RLL Breath Sounds Diminished   IKE Breath Sounds Clear   LLL Breath Sounds Diminished   Oximetry   Continuous Oximetry Yes   Oxygen   Pulse Oximetry 96 %   O2 (LPM) 2   O2 Daily Delivery Respiratory  Silicone Nasal Cannula

## 2018-04-25 NOTE — PROGRESS NOTES
Pt arrive don GSU, no signs of distress.  at bedside. Pt deneis pain or nausea at this time. CMS intact, dressing c/d/I. Safety measures in place.

## 2018-04-25 NOTE — OR NURSING
1330: To PACU from OR via bed, sleeping, respirations spontaneous and non-labored via LMA.Icepack applied over c/d/i L knee surgical dressings.  1343: Pt awake and able to open mouth and lift head, LMA removed, pt able to cough on request.  1345: Sleepy, but arouses to voice, no pain, no nausea. Tolerating decrease in supplemental O2.  1400: More awake, sitting up in bed more. No pain, no nausea, tolerating decrease in supplemental O2.  1408: Xray at bedside, pt tolerating procedure well.  1415: Still no pain, no nausea, resting comfortably. Tolerating decrease in supplemental O2. Tried on room air, but saturations dropped to 80%. Placed her back on 2 LPM via NC. No change in surgical site assessment.  1430: Still no pain, no nausea, will transport to floor as soon as medication is done infusing, see MAR.  1435: Meets criteria to transfer to floor, RN that is to receive is discharging another pt, so she will call when she is available to take report.  1445: Still no pain, no nausea, sleepy, but arouses to voice, explained that we are waiting on floor RN to become available so we can transfer.  1500: Still resting comfortably, no pain, no nausea.  1515: Still waiting on room, resting comfortably, no nausea.  1520: Report given to ANN Ribera and pt readied for transfer.

## 2018-04-26 VITALS
RESPIRATION RATE: 18 BRPM | TEMPERATURE: 99.2 F | BODY MASS INDEX: 29.65 KG/M2 | HEART RATE: 80 BPM | OXYGEN SATURATION: 96 % | WEIGHT: 151.01 LBS | HEIGHT: 60 IN | DIASTOLIC BLOOD PRESSURE: 59 MMHG | SYSTOLIC BLOOD PRESSURE: 125 MMHG

## 2018-04-26 PROBLEM — M17.12 PRIMARY OSTEOARTHRITIS OF LEFT KNEE: Status: ACTIVE | Noted: 2018-04-26

## 2018-04-26 PROBLEM — G89.18 POST-OP PAIN: Status: ACTIVE | Noted: 2018-04-26

## 2018-04-26 LAB
HCT VFR BLD AUTO: 32.8 % (ref 37–47)
HGB BLD-MCNC: 11.3 G/DL (ref 12–16)

## 2018-04-26 PROCEDURE — 85018 HEMOGLOBIN: CPT

## 2018-04-26 PROCEDURE — 700101 HCHG RX REV CODE 250: Performed by: PHYSICIAN ASSISTANT

## 2018-04-26 PROCEDURE — 700112 HCHG RX REV CODE 229: Performed by: PHYSICIAN ASSISTANT

## 2018-04-26 PROCEDURE — G8978 MOBILITY CURRENT STATUS: HCPCS | Mod: CJ

## 2018-04-26 PROCEDURE — 700102 HCHG RX REV CODE 250 W/ 637 OVERRIDE(OP): Performed by: PHYSICIAN ASSISTANT

## 2018-04-26 PROCEDURE — G8980 MOBILITY D/C STATUS: HCPCS | Mod: CJ

## 2018-04-26 PROCEDURE — 85014 HEMATOCRIT: CPT

## 2018-04-26 PROCEDURE — 96375 TX/PRO/DX INJ NEW DRUG ADDON: CPT

## 2018-04-26 PROCEDURE — A9270 NON-COVERED ITEM OR SERVICE: HCPCS | Performed by: PHYSICIAN ASSISTANT

## 2018-04-26 PROCEDURE — G0378 HOSPITAL OBSERVATION PER HR: HCPCS

## 2018-04-26 PROCEDURE — G8989 SELF CARE D/C STATUS: HCPCS | Mod: CI

## 2018-04-26 PROCEDURE — 36415 COLL VENOUS BLD VENIPUNCTURE: CPT

## 2018-04-26 PROCEDURE — 97535 SELF CARE MNGMENT TRAINING: CPT

## 2018-04-26 PROCEDURE — G8987 SELF CARE CURRENT STATUS: HCPCS | Mod: CI

## 2018-04-26 PROCEDURE — G8988 SELF CARE GOAL STATUS: HCPCS | Mod: CI

## 2018-04-26 PROCEDURE — 96376 TX/PRO/DX INJ SAME DRUG ADON: CPT

## 2018-04-26 PROCEDURE — 700105 HCHG RX REV CODE 258: Performed by: ORTHOPAEDIC SURGERY

## 2018-04-26 PROCEDURE — 700111 HCHG RX REV CODE 636 W/ 250 OVERRIDE (IP): Performed by: PHYSICIAN ASSISTANT

## 2018-04-26 PROCEDURE — 97162 PT EVAL MOD COMPLEX 30 MIN: CPT

## 2018-04-26 PROCEDURE — 97165 OT EVAL LOW COMPLEX 30 MIN: CPT

## 2018-04-26 PROCEDURE — G8979 MOBILITY GOAL STATUS: HCPCS | Mod: CJ

## 2018-04-26 RX ORDER — ASPIRIN 81 MG/1
81 TABLET ORAL 2 TIMES DAILY
Qty: 60 TAB | Refills: 0 | Status: SHIPPED | OUTPATIENT
Start: 2018-04-26 | End: 2020-11-09

## 2018-04-26 RX ORDER — TRAMADOL HYDROCHLORIDE 50 MG/1
50-100 TABLET ORAL EVERY 4 HOURS PRN
Qty: 80 TAB | Refills: 2 | Status: SHIPPED | OUTPATIENT
Start: 2018-04-26 | End: 2018-05-10

## 2018-04-26 RX ORDER — SODIUM CHLORIDE 9 MG/ML
INJECTION, SOLUTION INTRAVENOUS ONCE
Status: COMPLETED | OUTPATIENT
Start: 2018-04-26 | End: 2018-04-26

## 2018-04-26 RX ORDER — ACETAMINOPHEN 500 MG
750 TABLET ORAL EVERY 6 HOURS
Qty: 30 TAB | Refills: 0 | Status: SHIPPED | OUTPATIENT
Start: 2018-04-26 | End: 2020-11-09

## 2018-04-26 RX ORDER — ONDANSETRON 4 MG/1
4 TABLET, ORALLY DISINTEGRATING ORAL EVERY 4 HOURS PRN
Qty: 10 TAB | Refills: 0 | Status: SHIPPED | OUTPATIENT
Start: 2018-04-26 | End: 2019-09-09

## 2018-04-26 RX ORDER — PSEUDOEPHEDRINE HCL 30 MG
100 TABLET ORAL 2 TIMES DAILY
Qty: 60 CAP | Refills: 0 | Status: SHIPPED | OUTPATIENT
Start: 2018-04-26 | End: 2019-09-09

## 2018-04-26 RX ORDER — OXYCODONE HYDROCHLORIDE 5 MG/1
5 TABLET ORAL EVERY 8 HOURS PRN
Qty: 60 TAB | Refills: 0 | Status: SHIPPED | OUTPATIENT
Start: 2018-04-26 | End: 2018-05-10

## 2018-04-26 RX ORDER — DIAZEPAM 5 MG/1
5 TABLET ORAL EVERY 6 HOURS PRN
Qty: 50 TAB | Refills: 0 | Status: SHIPPED | OUTPATIENT
Start: 2018-04-26 | End: 2018-05-10

## 2018-04-26 RX ADMIN — SODIUM CHLORIDE: 9 INJECTION, SOLUTION INTRAVENOUS at 00:12

## 2018-04-26 RX ADMIN — LEVOTHYROXINE SODIUM 44 MCG: 88 TABLET ORAL at 06:13

## 2018-04-26 RX ADMIN — ACETAMINOPHEN 750 MG: 500 TABLET ORAL at 06:11

## 2018-04-26 RX ADMIN — OXYCODONE HYDROCHLORIDE 2.5 MG: 5 TABLET ORAL at 06:14

## 2018-04-26 RX ADMIN — DOCUSATE SODIUM 100 MG: 100 CAPSULE, LIQUID FILLED ORAL at 09:04

## 2018-04-26 RX ADMIN — WATER 2 G: 100 INJECTION, SOLUTION INTRAVENOUS at 04:04

## 2018-04-26 RX ADMIN — ASPIRIN 81 MG: 81 TABLET, COATED ORAL at 09:06

## 2018-04-26 RX ADMIN — DEXAMETHASONE SODIUM PHOSPHATE 8 MG: 4 INJECTION, SOLUTION INTRAMUSCULAR; INTRAVENOUS at 06:18

## 2018-04-26 RX ADMIN — METOPROLOL SUCCINATE 50 MG: 25 TABLET, EXTENDED RELEASE ORAL at 09:03

## 2018-04-26 RX ADMIN — OMEPRAZOLE 20 MG: 20 CAPSULE, DELAYED RELEASE ORAL at 09:03

## 2018-04-26 ASSESSMENT — GAIT ASSESSMENTS
DEVIATION: STEP TO
DISTANCE (FEET): 150
GAIT LEVEL OF ASSIST: STAND BY ASSIST
ASSISTIVE DEVICE: FRONT WHEEL WALKER

## 2018-04-26 ASSESSMENT — LIFESTYLE VARIABLES
ALCOHOL_USE: NO
EVER_SMOKED: YES

## 2018-04-26 ASSESSMENT — PAIN SCALES - GENERAL
PAINLEVEL_OUTOF10: 4
PAINLEVEL_OUTOF10: 0

## 2018-04-26 ASSESSMENT — PATIENT HEALTH QUESTIONNAIRE - PHQ9
2. FEELING DOWN, DEPRESSED, IRRITABLE, OR HOPELESS: NOT AT ALL
SUM OF ALL RESPONSES TO PHQ9 QUESTIONS 1 AND 2: 0
1. LITTLE INTEREST OR PLEASURE IN DOING THINGS: NOT AT ALL

## 2018-04-26 ASSESSMENT — ACTIVITIES OF DAILY LIVING (ADL): TOILETING: INDEPENDENT

## 2018-04-26 NOTE — PROGRESS NOTES
S:  s/p left TKA  Pain controlled  No N/V  No Chest Pain/SOB  Afebrile  Exam:    NAD A& O x3    RLE: +DF/PF/EHL SILT L4/L5/S1  LLE:  +DF/PF/EHL SILT L4/L5/S1    Plan:  Continue standard plan of care  Weight bearing: as tolerated with walker/cane  DVT prophylaxis: SCD/Teds + ASA   Dispo: home today

## 2018-04-26 NOTE — THERAPY
"Physical Therapy Evaluation completed.   Bed Mobility:  Supine to Sit: Modified Independent  Transfers: Sit to Stand: Stand by Assist  Gait: Level Of Assist: Stand by Assist with Front-Wheel Walker   X 150    Plan of Care: Patient with no further skilled PT needs in the acute care setting at this time  Discharge Recommendations: Equipment: No Equipment Needed. Post-acute therapy Discharge to home with outpatient or home health for additional skilled therapy services.    See \"Rehab Therapy-Acute\" Patient Summary Report for complete documentation.     "

## 2018-04-26 NOTE — DISCHARGE SUMMARY
Jill Diaz was admitted on 4/25/2018 for DJD LEFT KNEE  Degenerative arthritis of left knee  Degenerative arthritis of left knee  Patient was diagnosed with severe degenerative arthritis in the left knee and underwent a left total knee arthroplasty by Dr. Micaela Judd on the date of admission. Please see dictated operative note for further information.    Hospital course: standard    The patient has done well, with no complications.  Patient denies chest pain, calf pain or shortness of breath.   Pain is well-controlled at present.  Patient is ambulating well with the use of an assistive device, and progressing in physical therapy.   Patient is neurologically and vascularly intact with palpable pedal pulses bilaterally.      Discharge date: 4-26-18    Patient is being discharged to home after am physical therapy.     Allergies:  Nsaids and Demerol       Medication List      START taking these medications      Instructions   acetaminophen 500 MG Tabs  Commonly known as:  TYLENOL   Take 1.5 Tabs by mouth every 6 hours.  Dose:  750 mg     diazePAM 5 MG Tabs  Commonly known as:  VALIUM   Take 1 Tab by mouth every 6 hours as needed (muscle spasms) for up to 14 days.  Dose:  5 mg     docusate sodium 100 MG Caps   Take 100 mg by mouth 2 Times a Day.  Dose:  100 mg     ondansetron 4 MG Tbdp  Commonly known as:  ZOFRAN ODT   Take 1 Tab by mouth every four hours as needed for Nausea.  Dose:  4 mg     oxyCODONE immediate-release 5 MG Tabs  Commonly known as:  ROXICODONE   Take 1 Tab by mouth every 8 hours as needed (for pain; can be alternated with Tramadol for pain control) for up to 14 days.  Dose:  5 mg     tramadol 50 MG Tabs  Commonly known as:  ULTRAM   Take 1-2 Tabs by mouth every four hours as needed (Moderate Pain (NRS Pain Scale 4-6; CPOT Pain Scale 3-5) if opiates not ordered or tolerated) for up to 14 days.  Dose:   mg        CHANGE how you take these medications      Instructions   aspirin 81 MG  EC tablet  What changed:  when to take this   Take 1 Tab by mouth 2 times a day.  Dose:  81 mg     memantine 5 MG Tabs  What changed:  additional instructions  Commonly known as:  NAMENDA   Take 1 Tab by mouth 2 times a day.  Dose:  5 mg     potassium chloride ER 10 MEQ tablet  What changed:  · how much to take  · when to take this  · additional instructions  Commonly known as:  KLOR-CON   TAKE ONE TABLET BY MOUTH TWICE DAILY        CONTINUE taking these medications      Instructions   furosemide 20 MG Tabs  Commonly known as:  LASIX   TAKE ONE TABLET BY MOUTH ONCE DAILY     levothyroxine 88 MCG Tabs  Commonly known as:  SYNTHROID   Take 0.5 Tabs by mouth Every morning on an empty stomach.  Dose:  44 mcg     metoprolol SR 50 MG Tb24  Commonly known as:  TOPROL XL   Take 1 Tab by mouth every day.  Dose:  50 mg     omeprazole 20 MG delayed-release capsule  Commonly known as:  PRILOSEC   Take 1 Cap by mouth every day.  Dose:  20 mg     simvastatin 40 MG Tabs  Commonly known as:  ZOCOR   TAKE ONE TABLET BY MOUTH IN THE EVENING     vitamin B-12 1000 MCG Tabs   Take 1,000 mcg by mouth every day.  Dose:  1000 mcg     VITAMIN D (CHOLECALCIFEROL) PO   Take 2,000 Units by mouth every day.  Dose:  2000 Units            Discharge Instructions:     Patient is instructed to ambulate and weight bear as tolerated with the use of an assistive device, and to continue physical therapy exercises given during this hospital stay. Strict posterior hip precautions are to be observed for patients who underwent a total hip replacement.   Patient is to ice and elevate the surgical leg regularly, with pillows under the ankle, nothing is to be placed under the knee.   Patient was given detailed wound care instructions, and will leave the silver dressing on until first post-op visit.   ASA twice daily for DVT prophylaxis.  Patient is to follow up with Dr. Judd's office in 1-2 weeks.

## 2018-04-26 NOTE — PROGRESS NOTES
Patient's blood pressure increased to 94/49. Will continue to monitor it. Hooked back up to maintenance fluids.

## 2018-04-26 NOTE — PROGRESS NOTES
Bolus given patient's blood pressure 84/43. Administering second bolus per order in 250 ml increments.

## 2018-04-26 NOTE — PROGRESS NOTES
Discharge instructions given and disused. Pt states she has a walker at home. No acute changes. RX not given, RX shredded. Pt states she got RX pre surgery from [facility]. Pt discharged home in stable condition.

## 2018-04-26 NOTE — DISCHARGE INSTRUCTIONS
Discharge Instructions    The first week after your joint replacement is a time to recover from the surgery. We expect light exercise to keep you active and mobile. Dr. Judd requires a walker or cane for most patients in the first few days following surgery to try to prevent falls or complications.     Most patients are prescribed two medications for pain control: a narcotic such as Oxycodone or Hydrocodone and a milder medication called Tramadol. These can be alternated for pain control, and the priority is to decrease use of the stronger narcotic as soon as tolerated.   Take your pain medication as appropriate to ensure that your pain is not limiting your recovery. You'll be seen in clinic in 7-10 days (this appointment has already been made, call our office if you're unsure of the time). At that time, we'll prescribe your physical therapy to help with the recovery phase.     -Keep dressing clean and dry. Leave dressing in place until follow up. If you have an incisional vac dressing, the battery may die before your first post operative appointment, but you can leave the surgical dressing in place and it will be removed at your clinic visit. Call the office if you notice drainage from the surgical dressing.     -OK to shower, keep dressing in place. Pat dressing dry, do not rub incision     -Do not soak incision in bath, hot tub or pool     -Follow up with Dr. Judd at regularly scheduled time     -Call [facility] office at [phone] for questions     -Weight bearing status: as tolerated with walker/cane for ambulation support     -Patient may already have her post op prescriptions (written in the office)     -Take medication as prescribed for DVT prophylaxis    Discharged to home by car with relative. Discharged via wheelchair, hospital escort: Yes.  Special equipment needed: Not Applicable    Be sure to schedule a follow-up appointment with your primary care doctor or any specialists as instructed.     Discharge  Plan:   Diet Plan: Discussed  Activity Level: Discussed  Confirmed Follow up Appointment: Patient to Call and Schedule Appointment  Confirmed Symptoms Management: Discussed  Medication Reconciliation Updated: Yes  Pneumococcal Vaccine Administered/Refused: Not given - Patient refused pneumococcal vaccine  Influenza Vaccine Indication: Not indicated: Previously immunized this influenza season and > 8 years of age    I understand that a diet low in cholesterol, fat, and sodium is recommended for good health. Unless I have been given specific instructions below for another diet, I accept this instruction as my diet prescription.   Other diet: regular    Special Instructions: Discharge instructions for the Orthopedic Patient    Follow up with Primary Care Physician within 2 weeks of discharge to home, regarding:  Review of medications and diagnostic testing.  Surveillance for medical complications.  Workup and treatment of osteoporosis, if appropriate.     -Is this a Joint Replacement patient? Yes Total Joint Knee Replacement Discharge Instructions    Pain  - The goal is to slowly wean off the prescription pain medicine.  - Ice can be used for pain control.  20 minutes at a time is recommended, and never directly against your skin or incision.  - Most patients are off the pain pills by 3 weeks; others may require a low level of pain medications for many months.  If your pain continues to be severe, follow up with your physician.  Infection    Knee joint infections; occur in fewer than 2% of patients. The most common causes of infection following total knee replacement surgery are from bacteria that enter the bloodstream during dental procedures, urinary tract infections, or skin infections. These bacteria can lodge around your knee replacement and cause an infection.  - Keep the incision as clean and dry as possible.  - Always wash your hands before touching your incision.  - Skin infections tend to develop around 7-10  days after surgery; most can be treated with oral antibiotics.  - Dental Care should be delayed for 3 months after surgery, your surgeon recommends taking a dose of antibiotics 1 hour prior to any dental procedure. After 2 years, most surgeons recommend antibiotics only before an extensive procedure.  Ask your surgeon what he recommends.  - Signs and symptoms of infection can include:  low grade fever, redness, pain, swelling and drainage from your incision.  Notify your surgeon immediately if you develop any of these symptoms.  Other instructions  - Bowel habits - constipation is extremely common and is caused by a combination of anesthesia, lack of mobility and pain medicine.  Use stool softeners or laxatives if necessary. It is important not to ignore this problem, as bowel obstructions can be a serious complication after joint replacement surgery.  - Mood/Energy Level - Many patients experience a lack of energy and endurance for up to 2-3 months after surgery.  Some may also feel down and can even become depressed.  This is likely due to the postoperative anemia, change in activity level, lack of sleep, pain medicine and just the emotional reaction to the surgery itself that is a big disruption in a person’s life.  This usually passes.  If symptoms persist, follow up with your primary physician.  - Returning to work - Your surgeon will give you more specific instructions. Depending on the type of activities you perform, it may be 6 to 8 weeks before you return to work.   Generally, if you work a sedentary job requiring little standing or walking, most patients may return within 2-6 weeks.  Manual labor jobs involving walking, lifting and standing may take longer. Your surgeon’s office can provide a release to part-time or light duty work early on in your recovery and progress you to full duty as able.    - Driving - If your left knee was replaced and you have an automatic transmission, you may be able to begin  driving in a week or so, provided you are no longer taking narcotic pain medication. If your right knee was replaced, avoid driving for 6 to 8 weeks. Remember that your reflexes may not be as sharp as before your surgery. Ask your surgeon for specific instructions.   - Avoiding falls - A fall during the first few weeks after surgery can damage your new knee and may result in a need for further surgery.   throw rugs and tack down loose carpeting.  Be aware of floor hazards such as pets, small objects or uneven surfaces.    - [employer] Detectors - The sensitivity of metal detectors varies and it is likely that your prosthesis will cause an alarm.  Inform the  of your artificial joint.  Diet  - Resume your normal diet as tolerated.  - It is important to achieve a healthy nutritional status by eating a well balanced diet on a regular basis.  - Your physician may recommend that you take iron and vitamin supplements.   - Continue to drink plenty of fluids.  Shower/Bathing  - You may shower as soon as you get home from the hospital unless otherwise instructed.  - Keep your incision out of water.  To keep the incision dry when showering, cover it with a plastic bag or plastic wrap.  - Pat incision dry if it gets wet.  Don’t rub.  - Do not submerge in a bath until staples are out and the incision is completely healed. (Approximately 6-8 weeks)  Dressing Change:  Procedure (if recommended by your physician)  - Wash hands.  - Open all new dressing change materials.  - Remove old dressing and discard.  - Inspect incision for redness, increase in clear drainage, yellow/green drainage, odor and surrounding skin hot to touch.  -  ABD (large gauze) pad or “island dressing” by one corner and lay over the incision.  Be careful not to touch the inside of the dressing that will lay over the incision.  - Secure in place as instructed (Ace wrap or tape).    Swelling/Bruising    - Swelling can last from  3-6 months.  - Elevate your leg higher than your heart while reclining.   The first week you are home you should elevate your leg an equal amount of time, as you are active.    - Anti-inflammatory pills can be taken once you have stopped the blood thinners.  - The swelling is usually worse after you go home since you are upright for longer periods of time.  - Bruising is common and can involve the entire leg including the thigh, calf and even foot. Bruising often does not appear until after you arrive home and it can be quite dramatic- purple, black, and green.  The bruising you can see is not usually concerning and will subside without any treatment.      Blood Clot Prevention  Blood clots in the legs and the less common, but frightening, clots that travel to the lungs are a real focus of our preventative. Most patients are at standard risk for them, but those patients who are at higher risk include people who have had previous clots, a family history of clotting, smoking, diabetes, obesity, advanced age, use of estrogen and a sedentary lifestyle.    - Signs of blood clots in legs - Swelling in thigh, calf or ankle that does not go down with elevation.  Pain, heat and tenderness in calf, back of calf or groin area.  NOTE: blood clots can occur in either leg.  - You have been receiving anticoagulant therapy (blood thinners) in the hospital and you may be instructed to continue at home depending on your risk factors.  - Your risk for developing a clot continues for up to 2-3 months after surgery.  You should avoid prolonged sitting and dehydration during that time (long air trips and car trips).  If you do take a trip during this time, please get up and move around every 1- 1.5 hours.  - If you are prescribed blood-thinning medication for home, follow instructions as directed. (Handouts provided if applicable).      Activity  Once home, you should continue to stay active. The key is to remember not to overdo it!  While you can expect some good days and some bad days, you should notice a gradual improvement and a gradual increase in your endurance over the next 6 to 12 months. Exercise is a critical component of home care, particularly during the first few weeks after surgery.     - Normal activities of daily living You should be able to resume most within 3 to 6 weeks following surgery. Some pain with activity and at night is common for several weeks after surgery  -  Physical Therapy Exercises - Continue to do the exercises prescribed for at least two months after surgery. Riding a stationary bicycle can help maintain muscle tone and keep your knee flexible. Try to achieve the maximum degree of bending and extension possible. (handout provided by Therapist).  - Sexual Activity -. Your surgeon can tell you when it safe to resume sexual activity.    - Sleeping Positions - You can safely sleep on your back, on either side, or on your stomach.   - Other Activities - Walk as much as you like, but remember that walking is no substitute for the exercises your doctor and physical therapist will prescribe. Lower impact activities are preferred.  If you have specific questions, consult your Surgeon.    When to Call the Doctor   Call the physician if:   - Fever over 100.5? F  - Increased pain, drainage, redness, odor or heat around the incision area  - Shaking chills  - Increased knee pain with activity and rest  - Increased pain in calf, tenderness or redness above or below the knee  - Increased swelling of calf, ankle, foot  - Sudden increased shortness of breath, sudden onset of chest pain, localized chest pain with coughing  - Incision opening  Or, if there are any questions or concerns about medications or care.       -Is this patient being discharged with medication to prevent blood clots?  Yes, Aspirin Aspirin, ASA oral tablets  What is this medicine?  ASPIRIN (AS pir in) is a pain reliever. It is used to treat mild pain and  fever. This medicine is also used as directed by a doctor to prevent and to treat heart attacks, to prevent strokes, and to treat arthritis or inflammation.  This medicine may be used for other purposes; ask your health care provider or pharmacist if you have questions.  COMMON BRAND NAME(S): Aspir-Low, Aspir-Geovanna, Aspirtab, Kathleen Advanced Aspirin, Kathleen Aspirin, Kathleen Aspirin Extra Strength, Kathleen Aspirin Plus, Kathleen Extra Strength, Kathleen Extra Strength Plus, Kathleen Genuine Aspirin, Kathleen Womens Aspirin, Bufferin, Bufferin Extra Strength, Bufferin Low Dose  What should I tell my health care provider before I take this medicine?  They need to know if you have any of these conditions:  -anemia  -asthma  -bleeding problems  -child with chickenpox, the flu, or other viral infection  -diabetes  -gout  -if you frequently drink alcohol containing drinks  -kidney disease  -liver disease  -low level of vitamin K  -lupus  -smoke tobacco  -stomach ulcers or other problems  -an unusual or allergic reaction to aspirin, tartrazine dye, other medicines, dyes, or preservatives  -pregnant or trying to get pregnant  -breast-feeding  How should I use this medicine?  Take this medicine by mouth with a glass of water. Follow the directions on the package or prescription label. You can take this medicine with or without food. If it upsets your stomach, take it with food. Do not take your medicine more often than directed.  Talk to your pediatrician regarding the use of this medicine in children. While this drug may be prescribed for children as young as 12 years of age for selected conditions, precautions do apply. Children and teenagers should not use this medicine to treat chicken pox or flu symptoms unless directed by a doctor.  Patients over 65 years old may have a stronger reaction and need a smaller dose.  Overdosage: If you think you have taken too much of this medicine contact a poison control center or emergency room at  once.  NOTE: This medicine is only for you. Do not share this medicine with others.  What if I miss a dose?  If you are taking this medicine on a regular schedule and miss a dose, take it as soon as you can. If it is almost time for your next dose, take only that dose. Do not take double or extra doses.  What may interact with this medicine?  Do not take this medicine with any of the following medications:  -cidofovir  -ketorolac  -probenecid  This medicine may also interact with the following medications:  -alcohol  -alendronate  -bismuth subsalicylate  -flavocoxid  -herbal supplements like feverfew, garlic, edmar, ginkgo biloba, horse chestnut  -medicines for diabetes or glaucoma like acetazolamide, methazolamide  -medicines for gout  -medicines that treat or prevent blood clots like enoxaparin, heparin, ticlopidine, warfarin  -other aspirin and aspirin-like medicines  -NSAIDs, medicines for pain and inflammation, like ibuprofen or naproxen  -pemetrexed  -sulfinpyrazone  -varicella live vaccine  This list may not describe all possible interactions. Give your health care provider a list of all the medicines, herbs, non-prescription drugs, or dietary supplements you use. Also tell them if you smoke, drink alcohol, or use illegal drugs. Some items may interact with your medicine.  What should I watch for while using this medicine?  If you are treating yourself for pain, tell your doctor or health care professional if the pain lasts more than 10 days, if it gets worse, or if there is a new or different kind of pain. Tell your doctor if you see redness or swelling. Also, check with your doctor if you have a fever that lasts for more than 3 days. Only take this medicine to prevent heart attacks or blood clotting if prescribed by your doctor or health care professional.  Do not take aspirin or aspirin-like medicines with this medicine. Too much aspirin can be dangerous. Always read the labels carefully.  This medicine  can irritate your stomach or cause bleeding problems. Do not smoke cigarettes or drink alcohol while taking this medicine. Do not lie down for 30 minutes after taking this medicine to prevent irritation to your throat.  If you are scheduled for any medical or dental procedure, tell your healthcare provider that you are taking this medicine. You may need to stop taking this medicine before the procedure.  This medicine may be used to treat migraines. If you take migraine medicines for 10 or more days a month, your migraines may get worse. Keep a diary of headache days and medicine use. Contact your healthcare professional if your migraine attacks occur more frequently.  What side effects may I notice from receiving this medicine?  Side effects that you should report to your doctor or health care professional as soon as possible:  -allergic reactions like skin rash, itching or hives, swelling of the face, lips, or tongue  -breathing problems  -changes in hearing, ringing in the ears  -confusion  -general ill feeling or flu-like symptoms  -pain on swallowing  -redness, blistering, peeling or loosening of the skin, including inside the mouth or nose  -signs and symptoms of bleeding such as bloody or black, tarry stools; red or dark-brown urine; spitting up blood or brown material that looks like coffee grounds; red spots on the skin; unusual bruising or bleeding from the eye, gums, or nose  -trouble passing urine or change in the amount of urine  -unusually weak or tired  -yellowing of the eyes or skin  Side effects that usually do not require medical attention (report to your doctor or health care professional if they continue or are bothersome):  -diarrhea or constipation  -headache  -nausea, vomiting  -stomach gas, heartburn  This list may not describe all possible side effects. Call your doctor for medical advice about side effects. You may report side effects to FDA at 4-659-FDA-1119.  Where should I keep my  medicine?  Keep out of the reach of children.  Store at room temperature between 15 and 30 degrees C (59 and 86 degrees F). Protect from heat and moisture. Do not use this medicine if it has a strong vinegar smell. Throw away any unused medicine after the expiration date.  NOTE: This sheet is a summary. It may not cover all possible information. If you have questions about this medicine, talk to your doctor, pharmacist, or health care provider.  © 2018 [employer] (2014-08-19 11:30:31)      · Is patient discharged on Warfarin / Coumadin?   No       Total Knee Replacement, Care After  Refer to this sheet in the next few weeks. These instructions provide you with information about caring for yourself after your procedure. Your health care provider may also give you more specific instructions. Your treatment has been planned according to current medical practices, but problems sometimes occur. Call your health care provider if you have any problems or questions after your procedure.  What can I expect after the procedure?  After the procedure, it is common to have:  · Pain and swelling.  · A small amount of blood or clear fluid coming from your incision.  · Limited range of motion.  Follow these instructions at home:  Medicines  · Take over-the-counter and prescription medicines only as told by your health care provider.  · If you were prescribed an antibiotic medicine, take it as told by your health care provider. Do not stop taking the antibiotic even if you start to feel better.  · If you were prescribed a blood thinner (anticoagulant), take it as told by your health care provider.  If you have a splint or brace:  · Wear the immobilizer as told by your health care provider. Remove it only as told by your health care provider.  · Loosen the immobilizer if your toes tingle, become numb, or turn cold and blue.  · Do not let your immobilizer get wet if it is not waterproof.  · Keep the immobilizer  clean.  Bathing  · Do not take baths, swim, or use a hot tub until your health care provider approves. Ask your health care provider if you can take showers. You may only be allowed to take sponge baths for bathing.  · If you have an immobilizer that is not waterproof, cover it with a watertight covering when you take a bath or shower.  · Keep your bandage (dressing) dry until your health care provider says it can be removed.  Incision care and drain care  · Check your incision area and drain site every day for signs of infection. Check for:  ¨ More redness, swelling, or pain.  ¨ More fluid or blood.  ¨ Warmth.  ¨ Pus or a bad smell.  · Follow instructions from your health care provider about how to take care of your incision. Make sure you:  ¨ Wash your hands with soap and water before you change your dressing. If soap and water are not available, use hand .  ¨ Change your dressing as told by your health care provider.  ¨ Leave stitches (sutures), skin glue, or adhesive strips in place. These skin closures may need to stay in place for 2 weeks or longer. If adhesive strip edges start to loosen and curl up, you may trim the loose edges. Do not remove adhesive strips completely unless your health care provider tells you to do that.  · If you have a drain, follow instructions from your health care provider about caring for it. Do not remove the drain tube or any dressings around the tube opening unless your health care provider approves.  Managing pain, stiffness, and swelling  · If directed, put ice on your knee.  ¨ Put ice in a plastic bag.  ¨ Place a towel between your skin and the bag.  ¨ Leave the ice on for 20 minutes, 2-3 times per day.  · If directed, apply heat to the affected area as often as told by your health care provider. Use the heat source that your health care provider recommends, such as a moist heat pack or a heating pad.  ¨ Place a towel between your skin and the heat source.  ¨ Leave the  heat on for 20-30 minutes.  ¨ Remove the heat if your skin turns bright red. This is especially important if you are unable to feel pain, heat, or cold. You may have a greater risk of getting burned.  · Move your toes often to avoid stiffness and to lessen swelling.  · Raise (elevate) your knee above the level of your heart while you are sitting or lying down.  · Wear elastic knee support for as long as told by your health care provider.  Driving  · Do not drive until your health care provider approves. Ask your health care provider when it is safe to drive if you have an immobilizer on your knee.  · Do not drive or operate heavy machinery while taking prescription pain medicine.  · Do not drive for 24 hours if you received a sedative.  Activity  · Do not lift anything that is heavier than 10 lb (4.5 kg) until your health care provider approves.  · Do not play contact sports until your health care provider approves.  · Avoid high-impact activities, including running, jumping rope, and jumping jacks.  · Avoid sitting for a long time without moving. Get up and move around at least every few hours.  · If physical therapy was prescribed, do exercises as told by your health care provider.  · Return to your normal activities as told by your health care provider. Ask your health care provider what activities are safe for you.  Safety  · Do not use your leg to support your body weight until your health care provider approves. Use crutches or a walker as told by your health care provider.  General instructions  · Do not have any dental work done for at least 3 months after your surgery. When you do have dental work done, tell your dentist about your joint replacement.  · Do not use any tobacco products, such as cigarettes, chewing tobacco, or e-cigarettes. If you need help quitting, ask your health care provider.  · Wear compression stockings as told by your health care provider. These stockings help to prevent blood clots  and reduce swelling in your legs.  · If you have been sent home with a continuous passive motion machine, use it as told by your health care provider.  · Drink enough fluid to keep your urine clear or pale yellow.  · If you have been instructed to lose weight, follow instructions from your health care provider about how to do this safely.  · Keep all follow-up visits as told by your health care provider. This is important.  Contact a health care provider if:  · You have more redness, swelling, or pain around your incision or drain.  · You have more fluid or blood coming from your incision or drain.  · Your incision or drain site feels warm to the touch.  · You have pus or a bad smell coming from your incision or drain.  · You have a fever.  · Your incision breaks open after your health care provider removes your sutures, skin glue, or adhesive tape.  · Your prosthesis feels loose.  · You have knee pain that does not go away.  Get help right away if:  · You have a rash.  · You have pain or swelling in your calf or thigh.  · You have shortness of breath or difficulty breathing.  · You have chest pain.  · Your range of motion in your knee is getting worse.  This information is not intended to replace advice given to you by your health care provider. Make sure you discuss any questions you have with your health care provider.  Document Released: 07/07/2006 Document Revised: 08/21/2017 Document Reviewed: 11/23/2016  [employer] Patient Education © 2017 [employer]        Tramadol tablets  What is this medicine?  TRAMADOL (TRA ma dole) is a pain reliever. It is used to treat moderate to severe pain in adults.  This medicine may be used for other purposes; ask your health care provider or pharmacist if you have questions.  COMMON BRAND NAME(S): Ultram  What should I tell my health care provider before I take this medicine?  They need to know if you have any of these conditions:  -brain tumor  -depression  -drug  abuse or addiction  -head injury  -if you frequently drink alcohol containing drinks  -kidney disease or trouble passing urine  -liver disease  -lung disease, asthma, or breathing problems  -seizures or epilepsy  -suicidal thoughts, plans, or attempt; a previous suicide attempt by you or a family member  -an unusual or allergic reaction to tramadol, codeine, other medicines, foods, dyes, or preservatives  -pregnant or trying to get pregnant  -breast-feeding  How should I use this medicine?  Take this medicine by mouth with a full glass of water. Follow the directions on the prescription label. You can take it with or without food. If it upsets your stomach, take it with food. Do not take your medicine more often than directed.  A special MedGuide will be given to you by the pharmacist with each prescription and refill. Be sure to read this information carefully each time.  Talk to your pediatrician regarding the use of this medicine in children. Special care may be needed.  Overdosage: If you think you have taken too much of this medicine contact a poison control center or emergency room at once.  NOTE: This medicine is only for you. Do not share this medicine with others.  What if I miss a dose?  If you miss a dose, take it as soon as you can. If it is almost time for your next dose, take only that dose. Do not take double or extra doses.  What may interact with this medicine?  Do not take this medication with any of the following medicines:  -MAOIs like Carbex, Eldepryl, Marplan, Nardil, and Parnate  This medicine may also interact with the following medications:  -alcohol  -antihistamines for allergy, cough and cold  -certain medicines for anxiety or sleep  -certain medicines for depression like amitriptyline, fluoxetine, sertraline  -certain medicines for migraine headache like almotriptan, eletriptan, frovatriptan, naratriptan, rizatriptan, sumatriptan, zolmitriptan  -certain medicines for seizures like  carbamazepine, oxcarbazepine, phenobarbital, primidone  -certain medicines that treat or prevent blood clots like warfarin  -digoxin  -furazolidone  -general anesthetics like halothane, isoflurane, methoxyflurane, propofol  -linezolid  -local anesthetics like lidocaine, pramoxine, tetracaine  -medicines that relax muscles for surgery  -other narcotic medicines for pain or cough  -phenothiazines like chlorpromazine, mesoridazine, prochlorperazine, thioridazine  -procarbazine  This list may not describe all possible interactions. Give your health care provider a list of all the medicines, herbs, non-prescription drugs, or dietary supplements you use. Also tell them if you smoke, drink alcohol, or use illegal drugs. Some items may interact with your medicine.  What should I watch for while using this medicine?  Tell your doctor or health care professional if your pain does not go away, if it gets worse, or if you have new or a different type of pain. You may develop tolerance to the medicine. Tolerance means that you will need a higher dose of the medicine for pain relief. Tolerance is normal and is expected if you take this medicine for a long time.  Do not suddenly stop taking your medicine because you may develop a severe reaction. Your body becomes used to the medicine. This does NOT mean you are addicted. Addiction is a behavior related to getting and using a drug for a non-medical reason. If you have pain, you have a medical reason to take pain medicine. Your doctor will tell you how much medicine to take. If your doctor wants you to stop the medicine, the dose will be slowly lowered over time to avoid any side effects.  There are different types of narcotic medicines (opiates). If you take more than one type at the same time or if you are taking another medicine that also causes drowsiness, you may have more side effects. Give your health care provider a list of all medicines you use. Your doctor will tell you  how much medicine to take. Do not take more medicine than directed. Call emergency for help if you have problems breathing or unusual sleepiness.  You may get drowsy or dizzy. Do not drive, use machinery, or do anything that needs mental alertness until you know how this medicine affects you. Do not stand or sit up quickly, especially if you are an older patient. This reduces the risk of dizzy or fainting spells. Alcohol can increase or decrease the effects of this medicine. Avoid alcoholic drinks.  You may have constipation. Try to have a bowel movement at least every 2 to 3 days. If you do not have a bowel movement for 3 days, call your doctor or health care professional.  Your mouth may get dry. Chewing sugarless gum or sucking hard candy, and drinking plenty of water may help. Contact your doctor if the problem does not go away or is severe.  What side effects may I notice from receiving this medicine?  Side effects that you should report to your doctor or health care professional as soon as possible:  -allergic reactions like skin rash, itching or hives, swelling of the face, lips, or tongue  -breathing problems  -confusion  -seizures  -signs and symptoms of low blood pressure like dizziness; feeling faint or lightheaded, falls; unusually weak or tired  -trouble passing urine or change in the amount of urine  Side effects that usually do not require medical attention (report to your doctor or health care professional if they continue or are bothersome):  -constipation  -dry mouth  -nausea, vomiting  -tiredness  This list may not describe all possible side effects. Call your doctor for medical advice about side effects. You may report side effects to FDA at 3-180-FDA-0455.  Where should I keep my medicine?  Keep out of the reach of children.  This medicine may cause accidental overdose and death if it taken by other adults, children, or pets. Mix any unused medicine with a substance like cat litter or coffee  grounds. Then throw the medicine away in a sealed container like a sealed bag or a coffee can with a lid. Do not use the medicine after the expiration date.  Store at room temperature between 15 and 30 degrees C (59 and 86 degrees F).  NOTE: This sheet is a summary. It may not cover all possible information. If you have questions about this medicine, talk to your doctor, pharmacist, or health care provider.  © 2018 [employer] Standard (2016-09-11 09:00:04)  Oxycodone tablets or capsules  What is this medicine?  OXYCODONE (ox i KOE done) is a pain reliever. It is used to treat moderate to severe pain.  This medicine may be used for other purposes; ask your health care provider or pharmacist if you have questions.  COMMON BRAND NAME(S): Dazidox, Endocodone, Oxaydo, OXECTA, OxyIR, Percolone, Roxicodone, ROXYBOND  What should I tell my health care provider before I take this medicine?  They need to know if you have any of these conditions:  -Nicollet's disease  -brain tumor  -head injury  -heart disease  -history of drug or alcohol abuse problem  -if you often drink alcohol  -kidney disease  -liver disease  -lung or breathing disease, like asthma  -mental illness  -pancreatic disease  -seizures  -thyroid disease  -an unusual or allergic reaction to oxycodone, codeine, hydrocodone, morphine, other medicines, foods, dyes, or preservatives  -pregnant or trying to get pregnant  -breast-feeding  How should I use this medicine?  Take this medicine by mouth with a glass of water. Follow the directions on the prescription label. You can take it with or without food. If it upsets your stomach, take it with food. Take your medicine at regular intervals. Do not take it more often than directed. Do not stop taking except on your doctor's advice.  Some brands of this medicine, like Oxecta, have special instructions. Ask your doctor or pharmacist if these directions are for you: Do not cut, crush or chew this medicine. Swallow only  one tablet at a time. Do not wet, soak, or lick the tablet before you take it.  A special MedGuide will be given to you by the pharmacist with each prescription and refill. Be sure to read this information carefully each time.  Talk to your pediatrician regarding the use of this medicine in children. Special care may be needed.  Overdosage: If you think you have taken too much of this medicine contact a poison control center or emergency room at once.  NOTE: This medicine is only for you. Do not share this medicine with others.  What if I miss a dose?  If you miss a dose, take it as soon as you can. If it is almost time for your next dose, take only that dose. Do not take double or extra doses.  What may interact with this medicine?  This medicine may interact with the following medications:  -alcohol  -antihistamines for allergy, cough and cold  -antiviral medicines for HIV or AIDS  -atropine  -certain antibiotics like clarithromycin, erythromycin, linezolid, rifampin  -certain medicines for anxiety or sleep  -certain medicines for bladder problems like oxybutynin, tolterodine  -certain medicines for depression like amitriptyline, fluoxetine, sertraline  -certain medicines for fungal infections like ketoconazole, itraconazole, voriconazole  -certain medicines for migraine headache like almotriptan, eletriptan, frovatriptan, naratriptan, rizatriptan, sumatriptan, zolmitriptan  -certain medicines for nausea or vomiting like dolasetron, ondansetron, palonosetron  -certain medicines for Parkinson's disease like benztropine, trihexyphenidyl  -certain medicines for seizures like phenobarbital, phenytoin, primidone  -certain medicines for stomach problems like dicyclomine, hyoscyamine  -certain medicines for travel sickness like scopolamine  -diuretics  -general anesthetics like halothane, isoflurane, methoxyflurane, propofol  -ipratropium  -local anesthetics like lidocaine, pramoxine, tetracaine  -MAOIs like Carbex,  Eldepryl, Marplan, Nardil, and Parnate  -medicines that relax muscles for surgery  -methylene blue  -nilotinib  -other narcotic medicines for pain or cough  -phenothiazines like chlorpromazine, mesoridazine, prochlorperazine, thioridazine  This list may not describe all possible interactions. Give your health care provider a list of all the medicines, herbs, non-prescription drugs, or dietary supplements you use. Also tell them if you smoke, drink alcohol, or use illegal drugs. Some items may interact with your medicine.  What should I watch for while using this medicine?  Tell your doctor or health care professional if your pain does not go away, if it gets worse, or if you have new or a different type of pain. You may develop tolerance to the medicine. Tolerance means that you will need a higher dose of the medicine for pain relief. Tolerance is normal and is expected if you take this medicine for a long time.  Do not suddenly stop taking your medicine because you may develop a severe reaction. Your body becomes used to the medicine. This does NOT mean you are addicted. Addiction is a behavior related to getting and using a drug for a non-medical reason. If you have pain, you have a medical reason to take pain medicine. Your doctor will tell you how much medicine to take. If your doctor wants you to stop the medicine, the dose will be slowly lowered over time to avoid any side effects.  There are different types of narcotic medicines (opiates). If you take more than one type at the same time or if you are taking another medicine that also causes drowsiness, you may have more side effects. Give your health care provider a list of all medicines you use. Your doctor will tell you how much medicine to take. Do not take more medicine than directed. Call emergency for help if you have problems breathing or unusual sleepiness.  You may get drowsy or dizzy. Do not drive, use machinery, or do anything that needs mental  alertness until you know how the medicine affects you. Do not stand or sit up quickly, especially if you are an older patient. This reduces the risk of dizzy or fainting spells. Alcohol may interfere with the effect of this medicine. Avoid alcoholic drinks.  This medicine will cause constipation. Try to have a bowel movement at least every 2 to 3 days. If you do not have a bowel movement for 3 days, call your doctor or health care professional.  Your mouth may get dry. Chewing sugarless gum or sucking hard candy, and drinking plenty of water may help. Contact your doctor if the problem does not go away or is severe.  What side effects may I notice from receiving this medicine?  Side effects that you should report to your doctor or health care professional as soon as possible:  -allergic reactions like skin rash, itching or hives, swelling of the face, lips, or tongue  -breathing problems  -confusion  -signs and symptoms of low blood pressure like dizziness; feeling faint or lightheaded, falls; unusually weak or tired  -trouble passing urine or change in the amount of urine  -trouble swallowing  Side effects that usually do not require medical attention (report to your doctor or health care professional if they continue or are bothersome):  -constipation  -dry mouth  -nausea, vomiting  -tiredness  This list may not describe all possible side effects. Call your doctor for medical advice about side effects. You may report side effects to FDA at 5-442-FDA-8084.  Where should I keep my medicine?  Keep out of the reach of children. This medicine can be abused. Keep your medicine in a safe place to protect it from theft. Do not share this medicine with anyone. Selling or giving away this medicine is dangerous and against the law.  Store at room temperature between 15 and 30 degrees C (59 and 86 degrees F). Protect from light. Keep container tightly closed.  This medicine may cause accidental overdose and death if it is  taken by other adults, children, or pets. Flush any unused medicine down the toilet to reduce the chance of harm. Do not use the medicine after the expiration date.  NOTE: This sheet is a summary. It may not cover all possible information. If you have questions about this medicine, talk to your doctor, pharmacist, or health care provider.  © 2018 [employer]/Gold Standard (2016-11-01 16:55:57)  Ondansetron tablets  What is this medicine?  ONDANSETRON (on DAN se leonid) is used to treat nausea and vomiting caused by chemotherapy. It is also used to prevent or treat nausea and vomiting after surgery.  This medicine may be used for other purposes; ask your health care provider or pharmacist if you have questions.  COMMON BRAND NAME(S): Zofran  What should I tell my health care provider before I take this medicine?  They need to know if you have any of these conditions:  -heart disease  -history of irregular heartbeat  -liver disease  -low levels of magnesium or potassium in the blood  -an unusual or allergic reaction to ondansetron, granisetron, other medicines, foods, dyes, or preservatives  -pregnant or trying to get pregnant  -breast-feeding  How should I use this medicine?  Take this medicine by mouth with a glass of water. Follow the directions on your prescription label. Take your doses at regular intervals. Do not take your medicine more often than directed.  Talk to your pediatrician regarding the use of this medicine in children. Special care may be needed.  Overdosage: If you think you have taken too much of this medicine contact a poison control center or emergency room at once.  NOTE: This medicine is only for you. Do not share this medicine with others.  What if I miss a dose?  If you miss a dose, take it as soon as you can. If it is almost time for your next dose, take only that dose. Do not take double or extra doses.  What may interact with this medicine?  Do not take this medicine with any of the following  medications:  -apomorphine  -certain medicines for fungal infections like fluconazole, itraconazole, ketoconazole, posaconazole, voriconazole  -cisapride  -dofetilide  -dronedarone  -pimozide  -thioridazine  -ziprasidone  This medicine may also interact with the following medications:  -carbamazepine  -certain medicines for depression, anxiety, or psychotic disturbances  -fentanyl  -linezolid  -MAOIs like Carbex, Eldepryl, Marplan, Nardil, and Parnate  -methylene blue (injected into a vein)  -other medicines that prolong the QT interval (cause an abnormal heart rhythm)  -phenytoin  -rifampicin  -tramadol  This list may not describe all possible interactions. Give your health care provider a list of all the medicines, herbs, non-prescription drugs, or dietary supplements you use. Also tell them if you smoke, drink alcohol, or use illegal drugs. Some items may interact with your medicine.  What should I watch for while using this medicine?  Check with your doctor or health care professional right away if you have any sign of an allergic reaction.  What side effects may I notice from receiving this medicine?  Side effects that you should report to your doctor or health care professional as soon as possible:  -allergic reactions like skin rash, itching or hives, swelling of the face, lips or tongue  -breathing problems  -confusion  -dizziness  -fast or irregular heartbeat  -feeling faint or lightheaded, falls  -fever and chills  -loss of balance or coordination  -seizures  -sweating  -swelling of the hands or feet  -tightness in the chest  -tremors  -unusually weak or tired  Side effects that usually do not require medical attention (report to your doctor or health care professional if they continue or are bothersome):  -constipation or diarrhea  -headache  This list may not describe all possible side effects. Call your doctor for medical advice about side effects. You may report side effects to FDA at  1-800-FDA-1088.  Where should I keep my medicine?  Keep out of the reach of children.  Store between 2 and 30 degrees C (36 and 86 degrees F). Throw away any unused medicine after the expiration date.  NOTE: This sheet is a summary. It may not cover all possible information. If you have questions about this medicine, talk to your doctor, pharmacist, or health care provider.  © 2018 [employer] Standard (2014-09-24 16:27:45)  Diazepam tablets  What is this medicine?  DIAZEPAM (dye AZ e gina) is a benzodiazepine. It is used to treat anxiety and nervousness. It also can help treat alcohol withdrawal, relax muscles, and treat certain types of seizures.  This medicine may be used for other purposes; ask your health care provider or pharmacist if you have questions.  COMMON BRAND NAME(S): Valium  What should I tell my health care provider before I take this medicine?  They need to know if you have any of these conditions  -an alcohol or drug abuse problem  -bipolar disorder, depression, psychosis or other mental health condition  -glaucoma  -kidney or liver disease  -lung or breathing disease  -myasthenia gravis  -Parkinson's disease  -seizures or a history of seizures  -suicidal thoughts  -an unusual or allergic reaction to diazepam, other benzodiazepines, foods, dyes, or preservatives  -pregnant or trying to get pregnant  -breast-feeding  How should I use this medicine?  Take this medicine by mouth with a glass of water. Follow the directions on the prescription label. If this medicine upsets your stomach, take it with food or milk. Take your doses at regular intervals. Do not take your medicine more often than directed. If you have been taking this medicine regularly for some time, do not suddenly stop taking it. You must gradually reduce the dose or you may get severe side effects. Ask your doctor or health care professional for advice. Even after you stop taking this medicine it can still affect your body for  several days.  A special MedGuide will be given to you by the pharmacist with each prescription and refill. Be sure to read this information carefully each time.  Talk to your pediatrician regarding the use of this medicine in children. Special care may be needed.  Overdosage: If you think you have taken too much of this medicine contact a poison control center or emergency room at once.  NOTE: This medicine is only for you. Do not share this medicine with others.  What if I miss a dose?  If you miss a dose, take it as soon as you can. If it is almost time for your next dose, take only that dose. Do not take double or extra doses.  What may interact with this medicine?  Do not take this medicine with any of the following medications:  -narcotic medicines for cough  -sodium oxybate  This medicine may also interact with the following medications:  -alcohol  -antacids  -antihistamines for allergy, cough and cold  -certain antibiotics like clarithromycin, erythromycin, rifampin  -certain medicines for anxiety or sleep  -certain medicines for blood pressure, heart disease, irregular heartbeat  -certain medicines for depression, like amitriptyline, fluoxetine, sertraline  -certain medicines for fungal infections like ketoconazole, itraconazole, clotrimazole  -certain medicines for psychotic disturbances  -certain medicines for seizures like carbamazepine, phenobarbital, phenytoin, primidone, valproate  -cimetidine  -cyclosporine  -dexamethasone  -general anesthetics like lidocaine, pramoxine, tetracaine  -MAOIs like Carbex, Eldepryl, Marplan, Nardil, and Parnate  -medicines that relax muscles for surgery  -narcotic medicines for pain  -omeprazole  -paclitaxel  -phenothiazines like chlorpromazine, mesoridazine, prochlorperazine, thioridazine  -theophyline  -warfarin  This list may not describe all possible interactions. Give your health care provider a list of all the medicines, herbs, non-prescription drugs, or dietary  supplements you use. Also tell them if you smoke, drink alcohol, or use illegal drugs. Some items may interact with your medicine.  What should I watch for while using this medicine?  Tell your doctor or health care professional if your symptoms do not start to get better or if they get worse.  Do not stop taking except on your doctor's advice. You may develop a severe reaction. Your doctor will tell you how much medicine to take.  You may get drowsy or dizzy. Do not drive, use machinery, or do anything that needs mental alertness until you know how this medicine affects you. To reduce the risk of dizzy and fainting spells, do not stand or sit up quickly, especially if you are an older patient. Alcohol may increase dizziness and drowsiness. Avoid alcoholic drinks.  If you are taking another medicine that also causes drowsiness, you may have more side effects. Give your health care provider a list of all medicines you use. Your doctor will tell you how much medicine to take. Do not take more medicine than directed. Call emergency for help if you have problems breathing or unusual sleepiness.  What side effects may I notice from receiving this medicine?  Side effects that you should report to your doctor or health care professional as soon as possible:  -allergic reactions like skin rash, itching or hives, swelling of the face, lips, or tongue  -breathing problems  -confusion  -loss of balance or coordination  -signs and symptoms of low blood pressure like dizziness; feeling faint or lightheaded, falls; unusually weak or tired  -suicidal thoughts or other mood changes  -trouble passing urine or change in the amount of urine  Side effects that usually do not require medical attention (report to your doctor or health care professional if they continue or are bothersome):  -dizziness  -headache  -nausea, vomiting  -tiredness  This list may not describe all possible side effects. Call your doctor for medical advice about  side effects. You may report side effects to FDA at 4-946-FDA-5258.  Where should I keep my medicine?  Keep out of the reach of children. This medicine can be abused. Keep your medicine in a safe place to protect it from theft. Do not share this medicine with anyone. Selling or giving away this medicine is dangerous and against the law.  This medicine may cause accidental overdose and death if taken by other adults, children, or pets. Mix any unused medicine with a substance like cat litter or coffee grounds. Then throw the medicine away in a sealed container like a sealed bag or a coffee can with a lid. Do not use the medicine after the expiration date.  Store at room temperature between 15 and 30 degrees C (59 and 86 degrees F). Protect from light. Keep container tightly closed.  NOTE: This sheet is a summary. It may not cover all possible information. If you have questions about this medicine, talk to your doctor, pharmacist, or health care provider.  © 2018 Elsevier/Gold Standard (2016-09-16 10:52:36)    Depression / Suicide Risk    As you are discharged from this [facility] facility, it is important to learn how to keep safe from harming yourself.    Recognize the warning signs:  · Abrupt changes in personality, positive or negative- including increase in energy   · Giving away possessions  · Change in eating patterns- significant weight changes-  positive or negative  · Change in sleeping patterns- unable to sleep or sleeping all the time   · Unwillingness or inability to communicate  · Depression  · Unusual sadness, discouragement and loneliness  · Talk of wanting to die  · Neglect of personal appearance   · Rebelliousness- reckless behavior  · Withdrawal from people/activities they love  · Confusion- inability to concentrate     If you or a loved one observes any of these behaviors or has concerns about self-harm, here's what you can do:  · Talk about it- your feelings and reasons for harming  yourself  · Remove any means that you might use to hurt yourself (examples: pills, rope, extension cords, firearm)  · Get professional help from the community (Mental Health, Substance Abuse, psychological counseling)  · Do not be alone:Call your Safe Contact- someone whom you trust who will be there for you.  · Call your local CRISIS HOTLINE 883-5134 or 854-395-0575  · Call your local Children's Mobile Crisis Response Team [place] (692) 574-1072 or www.[employer]  · Call the toll free National Suicide Prevention Hotlines   · National Suicide Prevention Lifeline 813-017-WZCM (8379)  · National Hope Line Network 800-SUICIDE (971-7914)

## 2018-04-26 NOTE — CARE PLAN
Problem: Infection  Goal: Will remain free from infection  Outcome: PROGRESSING AS EXPECTED  Patient is afebrile. Dressing is c/d/i    Problem: Venous Thromboembolism (VTW)/Deep Vein Thrombosis (DVT) Prevention:  Goal: Patient will participate in Venous Thrombosis (VTE)/Deep Vein Thrombosis (DVT)Prevention Measures  Outcome: PROGRESSING AS EXPECTED   04/25/18 1949   Mechanical/VTE Prophylaxis   Mechanical Prophylaxis  SCDs, Sequential Compression Device   SCDs, Sequential Compression Device On   OTHER   Risk Assessment Score 2   VTE RISK Moderate   Pharmacologic Prophylaxis Used (ASA)

## 2018-04-26 NOTE — PROGRESS NOTES
Total Joint Replacement Program Rounding     Inpatient bedside rounding completed to address quality of care provided by total joint replacement program IDT and overall patient experience at [facility].    Performance Measures addressed: None.  Patient Satisfaction addressed: Pain well controlled - extremely happy and pleased with the whole process. Patient has very good understanding of post-op pain and pain control.   Additional notes: Patient/family updated on POC during hospital stay. Pt just finished working with OT and preparing for DC home w/ spouse. No further questions/concerns. RN updated.

## 2018-04-26 NOTE — PROGRESS NOTES
Received report from Marta JUAREZ Assumed care of the patient. Patient sitting up in bed, awake and alert. No reports of pain or nausea. CMS intact. Dressing c/d/I. Polar ice in place.

## 2018-04-26 NOTE — PROGRESS NOTES
Paged Giorgio Eastman at [facility] to give updates on patient's low blood pressures (78/42, 76/42). Awaiting call back.

## 2018-04-26 NOTE — THERAPY
"Occupational Therapy Evaluation completed.   Functional Status:  A&Ox4. WBAT LLE. Performs self-cares and functional mobility with Sup/Mod Indep. Walks with FWW; steady. Shows good safety. Caregiver training completed.  Plan of Care: Patient with no further skilled OT needs in the acute care setting at this time  Discharge Recommendations:  Equipment: No Equipment Needed. Post-acute therapy Discharge to home with outpatient or home health for additional skilled therapy services. D/C ready from OT.   See \"Rehab Therapy-Acute\" Patient Summary Report for complete documentation.    "

## 2018-05-22 ENCOUNTER — APPOINTMENT (OUTPATIENT)
Dept: MEDICAL GROUP | Facility: MEDICAL CENTER | Age: 78
End: 2018-05-22
Payer: MEDICARE

## 2018-06-02 DIAGNOSIS — I10 ESSENTIAL HYPERTENSION: ICD-10-CM

## 2018-06-03 RX ORDER — METOPROLOL SUCCINATE 50 MG/1
TABLET, EXTENDED RELEASE ORAL
Qty: 90 TAB | Refills: 3 | Status: SHIPPED | OUTPATIENT
Start: 2018-06-03 | End: 2018-06-06

## 2018-06-06 ENCOUNTER — HOSPITAL ENCOUNTER (OUTPATIENT)
Dept: LAB | Facility: MEDICAL CENTER | Age: 78
End: 2018-06-06
Attending: INTERNAL MEDICINE
Payer: MEDICARE

## 2018-06-06 ENCOUNTER — OFFICE VISIT (OUTPATIENT)
Dept: MEDICAL GROUP | Facility: MEDICAL CENTER | Age: 78
End: 2018-06-06
Payer: MEDICARE

## 2018-06-06 VITALS
WEIGHT: 143 LBS | BODY MASS INDEX: 28.07 KG/M2 | HEIGHT: 60 IN | DIASTOLIC BLOOD PRESSURE: 66 MMHG | SYSTOLIC BLOOD PRESSURE: 112 MMHG | OXYGEN SATURATION: 93 % | HEART RATE: 127 BPM | TEMPERATURE: 98.3 F | RESPIRATION RATE: 16 BRPM

## 2018-06-06 DIAGNOSIS — H57.11 PAIN OF RIGHT EYE: ICD-10-CM

## 2018-06-06 DIAGNOSIS — R51.9 NONINTRACTABLE HEADACHE, UNSPECIFIED CHRONICITY PATTERN, UNSPECIFIED HEADACHE TYPE: ICD-10-CM

## 2018-06-06 DIAGNOSIS — F09 MILD COGNITIVE DISORDER: ICD-10-CM

## 2018-06-06 DIAGNOSIS — E78.5 DYSLIPIDEMIA: ICD-10-CM

## 2018-06-06 DIAGNOSIS — E03.9 ACQUIRED HYPOTHYROIDISM: ICD-10-CM

## 2018-06-06 DIAGNOSIS — R00.0 TACHYCARDIA: ICD-10-CM

## 2018-06-06 DIAGNOSIS — I10 ESSENTIAL HYPERTENSION: ICD-10-CM

## 2018-06-06 DIAGNOSIS — Z00.00 MEDICARE ANNUAL WELLNESS VISIT, SUBSEQUENT: ICD-10-CM

## 2018-06-06 PROBLEM — G45.4 TRANSIENT GLOBAL AMNESIA: Status: RESOLVED | Noted: 2017-11-13 | Resolved: 2018-06-06

## 2018-06-06 PROBLEM — S06.0XAA BRAIN CONCUSSION: Status: RESOLVED | Noted: 2017-11-13 | Resolved: 2018-06-06

## 2018-06-06 PROBLEM — G89.18 POST-OP PAIN: Status: RESOLVED | Noted: 2018-04-26 | Resolved: 2018-06-06

## 2018-06-06 LAB
ALBUMIN SERPL BCP-MCNC: 4.3 G/DL (ref 3.2–4.9)
ALBUMIN/GLOB SERPL: 1.5 G/DL
ALP SERPL-CCNC: 52 U/L (ref 30–99)
ALT SERPL-CCNC: 10 U/L (ref 2–50)
ANION GAP SERPL CALC-SCNC: 13 MMOL/L (ref 0–11.9)
AST SERPL-CCNC: 14 U/L (ref 12–45)
BASOPHILS # BLD AUTO: 0.4 % (ref 0–1.8)
BASOPHILS # BLD: 0.03 K/UL (ref 0–0.12)
BILIRUB SERPL-MCNC: 0.4 MG/DL (ref 0.1–1.5)
BUN SERPL-MCNC: 14 MG/DL (ref 8–22)
CALCIUM SERPL-MCNC: 9.8 MG/DL (ref 8.5–10.5)
CHLORIDE SERPL-SCNC: 105 MMOL/L (ref 96–112)
CO2 SERPL-SCNC: 25 MMOL/L (ref 20–33)
CREAT SERPL-MCNC: 1.01 MG/DL (ref 0.5–1.4)
EOSINOPHIL # BLD AUTO: 0.18 K/UL (ref 0–0.51)
EOSINOPHIL NFR BLD: 2.5 % (ref 0–6.9)
ERYTHROCYTE [DISTWIDTH] IN BLOOD BY AUTOMATED COUNT: 50.5 FL (ref 35.9–50)
ERYTHROCYTE [SEDIMENTATION RATE] IN BLOOD BY WESTERGREN METHOD: 11 MM/HOUR (ref 0–30)
GLOBULIN SER CALC-MCNC: 2.9 G/DL (ref 1.9–3.5)
GLUCOSE SERPL-MCNC: 109 MG/DL (ref 65–99)
HCT VFR BLD AUTO: 41.3 % (ref 37–47)
HGB BLD-MCNC: 13.7 G/DL (ref 12–16)
IMM GRANULOCYTES # BLD AUTO: 0.02 K/UL (ref 0–0.11)
IMM GRANULOCYTES NFR BLD AUTO: 0.3 % (ref 0–0.9)
LYMPHOCYTES # BLD AUTO: 1.91 K/UL (ref 1–4.8)
LYMPHOCYTES NFR BLD: 26 % (ref 22–41)
MCH RBC QN AUTO: 34.9 PG (ref 27–33)
MCHC RBC AUTO-ENTMCNC: 33.2 G/DL (ref 33.6–35)
MCV RBC AUTO: 105.4 FL (ref 81.4–97.8)
MONOCYTES # BLD AUTO: 0.6 K/UL (ref 0–0.85)
MONOCYTES NFR BLD AUTO: 8.2 % (ref 0–13.4)
NEUTROPHILS # BLD AUTO: 4.6 K/UL (ref 2–7.15)
NEUTROPHILS NFR BLD: 62.6 % (ref 44–72)
NRBC # BLD AUTO: 0 K/UL
NRBC BLD-RTO: 0 /100 WBC
PLATELET # BLD AUTO: 285 K/UL (ref 164–446)
PMV BLD AUTO: 9.6 FL (ref 9–12.9)
POTASSIUM SERPL-SCNC: 3.6 MMOL/L (ref 3.6–5.5)
PROT SERPL-MCNC: 7.2 G/DL (ref 6–8.2)
RBC # BLD AUTO: 3.92 M/UL (ref 4.2–5.4)
SODIUM SERPL-SCNC: 143 MMOL/L (ref 135–145)
WBC # BLD AUTO: 7.3 K/UL (ref 4.8–10.8)

## 2018-06-06 PROCEDURE — 93000 ELECTROCARDIOGRAM COMPLETE: CPT | Performed by: INTERNAL MEDICINE

## 2018-06-06 PROCEDURE — 85025 COMPLETE CBC W/AUTO DIFF WBC: CPT

## 2018-06-06 PROCEDURE — 80053 COMPREHEN METABOLIC PANEL: CPT

## 2018-06-06 PROCEDURE — G0439 PPPS, SUBSEQ VISIT: HCPCS | Performed by: INTERNAL MEDICINE

## 2018-06-06 PROCEDURE — 99214 OFFICE O/P EST MOD 30 MIN: CPT | Mod: 25 | Performed by: INTERNAL MEDICINE

## 2018-06-06 PROCEDURE — 85652 RBC SED RATE AUTOMATED: CPT

## 2018-06-06 PROCEDURE — 36415 COLL VENOUS BLD VENIPUNCTURE: CPT

## 2018-06-06 RX ORDER — METOPROLOL SUCCINATE 25 MG/1
25 TABLET, EXTENDED RELEASE ORAL DAILY
Qty: 90 TAB | Refills: 3 | Status: SHIPPED | OUTPATIENT
Start: 2018-06-06 | End: 2019-08-29 | Stop reason: SDUPTHER

## 2018-06-06 ASSESSMENT — PAIN SCALES - GENERAL: PAINLEVEL: 2=MINIMAL-SLIGHT

## 2018-06-06 ASSESSMENT — ACTIVITIES OF DAILY LIVING (ADL): BATHING_REQUIRES_ASSISTANCE: 0

## 2018-06-06 ASSESSMENT — PATIENT HEALTH QUESTIONNAIRE - PHQ9: CLINICAL INTERPRETATION OF PHQ2 SCORE: 0

## 2018-06-06 ASSESSMENT — ENCOUNTER SYMPTOMS: GENERAL WELL-BEING: GOOD

## 2018-06-06 NOTE — PROGRESS NOTES
CC: Headache and eye pain tachycardic due for wellness.    HPI:   Jill presents today with the following.      1. Medicare annual wellness visit, subsequent  Screenings performed below.    2. Nonintractable headache, unspecified chronicity pattern, unspecified headache type  Main complaint is headache.  Reports it started approximately 4 days ago.  Fairly severe nature.  Acute pain is completely gone.  She does get an occasional throbbing sensation lasting seconds but now that is resolving as well.  She has no blurred vision or slurred speech but did notice some light touch sensation of pain over her eye.  She reports it was slightly painful in her ear as well.  She denied development of any rash.  No eye problems to speak of other than the pain component.    3. Pain of right eye  Again no changes to vision but reports the eye is somewhat throbbing as well initially.  Nothing seems with made the symptoms better or worse and comparatively is almost 90% improved.    4. Tachycardia  She is noted to be tachycardic.  She was on calcium channel blocker and metoprolol but postop for knee she has no longer on either.  Heart rate has been persistently elevated.  Her blood pressures to get somewhat low is the reason for stopping them.  He was previously on 50 mg.        Information for advance directives given to patient or instructed to bring in advance directives into to office to put in chart.      Depression Screening    Little interest or pleasure in doing things?  0 - not at all  Feeling down, depressed , or hopeless? 0 - not at all  Patient Health Questionnaire Score: 0     If depressive symptoms identified deferred to follow up visit unless specifically addressed in assessment and plan.    Interpretation of PHQ-9 Total Score   Score Severity   1-4 No Depression   5-9 Mild Depression   10-14 Moderate Depression   15-19 Moderately Severe Depression   20-27 Severe Depression    Screening for Cognitive  Impairment    Three Minute Recall (leader, season, table) 2/3    Ck clock face with all 12 numbers and set the hands to show 10 past 11.  Yes    Cognitive concerns identified deferred for follow up unless specifically addressed in assessment and plan.    Fall Risk Assessment    Has the patient had two or more falls in the last year or any fall with injury in the last year?       Safety Assessment    Throw rugs on floor.  No  Handrails on all stairs.  Yes  Good lighting in all hallways.  Yes  Difficulty hearing.  No  Patient counseled about all safety risks that were identified.    Functional Assessment ADLs    Are there any barriers preventing you from cooking for yourself or meeting nutritional needs?  No.    Are there any barriers preventing you from driving safely or obtaining transportation?  No.    Are there any barriers preventing you from using a telephone or calling for help?  No.    Are there any barriers preventing you from shopping?  No.    Are there any barriers preventing you from taking care of your own finances?  No.    Are there any barriers preventing you from managing your medications?  No.    Are there any barriers preventing you from showering, bathing or dressing yourself?  No.    Are you currently engaging in any exercise or physical activity?  No.     What is your perception of your health?  Good.      Health Maintenance Summary                Annual Wellness Visit Next Due 7/19/2018      Done 7/18/2017 Visit Dx: Medicare annual wellness visit, subsequent     Patient has more history with this topic...    BONE DENSITY Next Due 10/17/2019      Previously completed 10/17/2014      Patient has more history with this topic...    COLONOSCOPY Next Due 8/21/2023      Done 8/21/2013 AMB REFERRAL TO GI FOR COLONOSCOPY    IMM DTaP/Tdap/Td Vaccine Next Due 4/18/2025      Done 4/18/2015 Imm Admin: Tdap Vaccine          Patient Care Team:  Hector Neville M.D. as PCP - General (Internal Medicine)  Tereso  BENSON Hearn as Consulting Physician (Cardiology)  Micaela Judd M.D. as Consulting Physician (Orthopaedics)  Bunny Sorto M.D. as Consulting Physician (Neurology)            Health Care Screening: Age-appropriate preventive services that Medicare covers were discussed today and ordered as indicated and agreed upon by the patient, and as recommended by USPTF and ACIP.     Patient Active Problem List    Diagnosis Date Noted   • Acquired hypothyroidism 05/08/2014     Priority: Medium     Class: Chronic   • Glucose intolerance (impaired glucose tolerance) 05/08/2014     Priority: Medium     Class: Chronic   • Essential hypertension 07/05/2011     Priority: Medium   • Dyslipidemia 07/05/2011     Priority: Medium   • Tachycardia 06/06/2018   • Primary osteoarthritis of left knee 04/26/2018   • Mild cognitive disorder 03/27/2018   • Vitamin deficiency 03/27/2018   • Gastroesophageal reflux disease without esophagitis 08/29/2016   • Chronic pain of left knee 06/08/2016       Current Outpatient Prescriptions   Medication Sig Dispense Refill   • metoprolol SR (TOPROL XL) 25 MG TABLET SR 24 HR Take 1 Tab by mouth every day. 90 Tab 3   • acetaminophen (TYLENOL) 500 MG Tab Take 1.5 Tabs by mouth every 6 hours. 30 Tab 0   • aspirin EC 81 MG EC tablet Take 1 Tab by mouth 2 times a day. 60 Tab 0   • ondansetron (ZOFRAN ODT) 4 MG TABLET DISPERSIBLE Take 1 Tab by mouth every four hours as needed for Nausea. 10 Tab 0   • docusate sodium 100 MG Cap Take 100 mg by mouth 2 Times a Day. 60 Cap 0   • Cyanocobalamin (VITAMIN B-12) 1000 MCG Tab Take 1,000 mcg by mouth every day.     • memantine (NAMENDA) 5 MG Tab Take 1 Tab by mouth 2 times a day. (Patient taking differently: Take 5 mg by mouth 2 times a day. Just prescribed and pt plans to start after surgery on 4/25/2018) 60 Tab 3   • omeprazole (PRILOSEC) 20 MG delayed-release capsule Take 1 Cap by mouth every day. 90 Cap 4   • furosemide (LASIX) 20 MG Tab TAKE ONE TABLET BY MOUTH ONCE  DAILY 90 Tab 3   • levothyroxine (SYNTHROID) 88 MCG Tab Take 0.5 Tabs by mouth Every morning on an empty stomach. 45 Tab 11   • simvastatin (ZOCOR) 40 MG Tab TAKE ONE TABLET BY MOUTH IN THE EVENING 90 Tab 3   • potassium chloride ER (KLOR-CON) 10 MEQ tablet TAKE ONE TABLET BY MOUTH TWICE DAILY (Patient taking differently: 10 mEq every day. TAKE ONE TABLET BY MOUTH TWICE DAILY) 180 Tab 3   • VITAMIN D, CHOLECALCIFEROL, PO Take 2,000 Units by mouth every day.       No current facility-administered medications for this visit.        Family History   Problem Relation Age of Onset   • Lung Disease Father    • Heart Disease Father    • Cancer Mother    • Arrythmia Sister        Social History     Social History   • Marital status:      Spouse name: N/A   • Number of children: N/A   • Years of education: N/A     Occupational History   • Not on file.     Social History Main Topics   • Smoking status: Former Smoker     Packs/day: 1.00     Years: 12.00     Quit date: 1/1/1975   • Smokeless tobacco: Never Used   • Alcohol use 0.0 oz/week      Comment: twice a year   • Drug use: No   • Sexual activity: Not Currently     Other Topics Concern   • Not on file     Social History Narrative   • No narrative on file       Past Surgical History:   Procedure Laterality Date   • KNEE ARTHROPLASTY TOTAL Left 4/25/2018    Procedure: KNEE ARTHROPLASTY TOTAL;  Surgeon: Micaela Judd M.D.;  Location: SURGERY [facility];  Service: Orthopedics   • COLPOSACROPEXY ROBOTIC  11/14/2011    Performed by [facility] at SURGERY [facility]   • CYSTOSCOPY  11/14/2011    Performed by SUMMER RAINES at SURGERY [facility]   • HYSTERECTOMY, TOTAL ABDOMINAL  1989   • CHOLECYSTECTOMY  1971   • BLADDER SLING FEMALE  approx 2008 & 2010    bladder slings x2   • CYSTOSCOPY  1990's    bladder stones       Allergies as of 06/06/2018 - Reviewed 06/06/2018   Allergen Reaction Noted   • Nsaids  07/05/2011   • Demerol Vomiting  11/14/2011        ROS: Denies Chest pain, SOB, LE edema.    /66   Pulse (!) 127   Temp 36.8 °C (98.3 °F)   Resp 16   Ht 1.524 m (5')   Wt 64.9 kg (143 lb)   SpO2 93%   BMI 27.93 kg/m²     Physical Exam:  Gen:         Alert and oriented, No apparent distress.  Neck:        No Lymphadenopathy or Bruits.  Lungs:     Clear to auscultation bilaterally  CV:          Regular rate and rhythm. No murmurs, rubs or gallops.  Abd:         Soft non tender, non distended. Normal active bowel sounds.  No  Hepatosplenomegaly, No pulsatile masses.                   Ext:          No clubbing, cyanosis, edema.    EKG: Sinus tachycardia, no acute st-t wave changes.  Assessment and Plan.   77 y.o. female with the following issues.    1. Medicare annual wellness visit, subsequent  Discussed healthy lifestyle habits as well as screening regimens.    2. Nonintractable headache, unspecified chronicity pattern, unspecified headache type  Symptoms resolving have sent for blood work given ER precautions the fact that they are improving significantly less concerning.  She is given symptoms to watch for.  If she develops any rash or report them immediately.  - EKG - Clinic performed  - COMP METABOLIC PANEL; Future  - CBC WITH DIFFERENTIAL; Future  - WESTERGREN SED RATE; Future    3. Pain of right eye  Have gotten to ophthalmology to check pressures.  - REFERRAL TO OPHTHALMOLOGY    4. Tachycardia  Placing back on metoprolol at reduced dose 25 mg.  - EKG - Clinic performed  - metoprolol SR (TOPROL XL) 25 MG TABLET SR 24 HR; Take 1 Tab by mouth every day.  Dispense: 90 Tab; Refill: 3    5. Dyslipidemia  Lipids currently well controlled. Discussed continued diet and exercise recheck 6 months to 1 year.    6. Essential hypertension  Currently well controlled, Discuss diet, exercise and salt restriction.  No change to medication therapy.    7. Mild cognitive disorder  Stable    8. Acquired hypothyroidism  Currently adequately replaced,  recheck 6 months to one year.        Referrals offered: Community-based lifestyle interventions to reduce health risks and promote self-management and wellness, fall prevention, nutrition, physical activity, tobacco-use cessation, weight loss, and mental health services as per orders if indicated.    Discussion today about general wellness and lifestyle habits:    · Prevent falls and reduce trip hazards; Cautioned about securing or removing rugs.  · Have a working fire alarm and carbon monoxide detector;   · Engage in regular physical activity and social activities

## 2018-06-11 ENCOUNTER — TELEPHONE (OUTPATIENT)
Dept: MEDICAL GROUP | Facility: MEDICAL CENTER | Age: 78
End: 2018-06-11

## 2018-06-11 DIAGNOSIS — B02.9 HERPES ZOSTER WITHOUT COMPLICATION: ICD-10-CM

## 2018-06-11 RX ORDER — VALACYCLOVIR HYDROCHLORIDE 1 G/1
1000 TABLET, FILM COATED ORAL 3 TIMES DAILY
Qty: 21 TAB | Refills: 0 | Status: SHIPPED | OUTPATIENT
Start: 2018-06-11 | End: 2018-06-18

## 2018-06-11 NOTE — TELEPHONE ENCOUNTER
Spoke with patient and let them know Hector Neville M.D.'s message. She stated her eye is swollen shut but she has the referral information to [facility] and will call this morning for an appt.

## 2018-06-11 NOTE — TELEPHONE ENCOUNTER
VOICEMAIL  1. Caller Name: Jill                      Call Back Number: [phone]    2. Message: Patient called on Saturday and the pain on her face has developed into a blistery rash on her forehead and around her eye since she was seen last Wednesday. She was wondering about getting medication for this or what she needs to do. Please advise.    3. Patient approves office to leave a detailed voicemail/MyChart message: N\A

## 2018-08-21 ENCOUNTER — OFFICE VISIT (OUTPATIENT)
Dept: NEUROLOGY | Facility: MEDICAL CENTER | Age: 78
End: 2018-08-21
Payer: MEDICARE

## 2018-08-21 VITALS
OXYGEN SATURATION: 96 % | SYSTOLIC BLOOD PRESSURE: 118 MMHG | TEMPERATURE: 97.7 F | HEART RATE: 102 BPM | HEIGHT: 62 IN | DIASTOLIC BLOOD PRESSURE: 70 MMHG | WEIGHT: 135.5 LBS | RESPIRATION RATE: 16 BRPM | BODY MASS INDEX: 24.93 KG/M2

## 2018-08-21 DIAGNOSIS — R41.3 SPELL OF MEMORY LOSS: ICD-10-CM

## 2018-08-21 DIAGNOSIS — F09 MILD COGNITIVE DISORDER: ICD-10-CM

## 2018-08-21 PROCEDURE — 99214 OFFICE O/P EST MOD 30 MIN: CPT | Performed by: PSYCHIATRY & NEUROLOGY

## 2018-08-21 RX ORDER — MEMANTINE HYDROCHLORIDE 10 MG/1
10 TABLET ORAL 2 TIMES DAILY
Qty: 180 TAB | Refills: 1 | Status: SHIPPED | OUTPATIENT
Start: 2018-08-21 | End: 2019-01-02 | Stop reason: SDUPTHER

## 2018-08-21 RX ORDER — LEVETIRACETAM 250 MG/1
125 TABLET ORAL 2 TIMES DAILY
Qty: 30 TAB | Refills: 6 | Status: SHIPPED | OUTPATIENT
Start: 2018-08-21 | End: 2019-01-02

## 2018-08-21 NOTE — PROGRESS NOTES
NEUROLOGY NOTE    Referring Physician  Hector Neville M.D.      CHIEF COMPLAINT:  July 1st 2017--she lost the memory for that day  Her daughter's marriage date ( July 1st 2017)  Chief Complaint   Patient presents with   • Follow-Up     Mild cognitive disorder       PRESENT ILLNESS:   July 1st 2017--she lost the memory for that day  Her daughter's marriage date ( July 1st 2017)      Besides that, the patient noticed a couple of events that she had memory problems  Like she could not recall what she did in one morning-- like watering plants etc  At times , she had trouble findings words-- her  has hearing problem    Memory deteriorating since 2016          PAST MEDICAL HISTORY:  Past Medical History:   Diagnosis Date   • Arrhythmia     PAT, last episode 11/2015; cardiologist, Dr. Hearn   • Arthritis    • Bowel habit changes     constipation/ diarrhea   • Cancer (HCC) 2004    squamous cell skin   • Chronic pain of left knee 6/8/2016   • Dental disorder     implants   • Heart burn    • Hiatus hernia syndrome    • High cholesterol    • Hyperlipidemia 7/5/2011   • Hypertension 7/5/2011   • Indigestion    • Kidney stones last 2014   • Mild cognitive disorder    • Pneumonia 2007   • Psychiatric problem     depression   • Renal disorder 2009    stones   • Unspecified urinary incontinence     mild   • Urinary bladder disorder        PAST SURGICAL HISTORY:  Past Surgical History:   Procedure Laterality Date   • KNEE ARTHROPLASTY TOTAL Left 4/25/2018    Procedure: KNEE ARTHROPLASTY TOTAL;  Surgeon: Micaela Judd M.D.;  Location: [facility];  Service: Orthopedics   • COLPOSACROPEXY ROBOTIC  11/14/2011    Performed by [facility] at SURGERY [facility]   • CYSTOSCOPY  11/14/2011    Performed by SUMMER RAINES at SURGERY [facility]   • HYSTERECTOMY, TOTAL ABDOMINAL  1989   • CHOLECYSTECTOMY  1971   • BLADDER SLING FEMALE  approx 2008 & 2010    bladder slings x2   • CYSTOSCOPY  1990's    bladder stones       FAMILY HISTORY:  Family History   Problem Relation Age of Onset   • Lung Disease Father    • Heart Disease Father    • Cancer Mother    • Arrythmia Sister        SOCIAL HISTORY:  Social History     Social History   • Marital status:      Spouse name: N/A   • Number of children: N/A   • Years of education: N/A     Occupational History   • Not on file.     Social History Main Topics   • Smoking status: Former Smoker     Packs/day: 1.00     Years: 12.00     Quit date: 1/1/1975   • Smokeless tobacco: Never Used   • Alcohol use 0.0 oz/week      Comment: twice a year   • Drug use: No   • Sexual activity: Not Currently     Other Topics Concern   • Not on file     Social History Narrative   • No narrative on file     ALLERGIES:  Allergies   Allergen Reactions   • Nsaids      Ankle swelling and joint pain   • Demerol Vomiting     TOBHX  History   Smoking Status   • Former Smoker   • Packs/day: 1.00   • Years: 12.00   • Quit date: 1/1/1975   Smokeless Tobacco   • Never Used     ALCHX  History   Alcohol Use   • 0.0 oz/week     Comment: twice a year     DRUGHX  History   Drug Use No           MEDICATIONS:  Current Outpatient Prescriptions   Medication   • metoprolol SR (TOPROL XL) 25 MG TABLET SR 24 HR   • acetaminophen (TYLENOL) 500 MG Tab   • aspirin EC 81 MG EC tablet   • Cyanocobalamin (VITAMIN B-12) 1000 MCG Tab   • memantine (NAMENDA) 5 MG Tab   • omeprazole (PRILOSEC) 20 MG delayed-release capsule   • furosemide (LASIX) 20 MG Tab   • levothyroxine (SYNTHROID) 88 MCG Tab   • simvastatin (ZOCOR) 40 MG Tab   • potassium chloride ER (KLOR-CON) 10 MEQ tablet   • VITAMIN D, CHOLECALCIFEROL, PO   • ondansetron (ZOFRAN ODT) 4 MG TABLET DISPERSIBLE   • docusate sodium 100 MG Cap     No current facility-administered medications for this visit.        REVIEW OF SYSTEM:    Constitutional: Denies fevers, Denies weight changes   Eyes: Denies changes in vision, no eye pain   Ears/Nose/Throat/Mouth: Denies  "nasal congestion or sore throat   Cardiovascular: Denies chest pain or palpitations   Respiratory: Denies SOB.   Gastrointestinal/Hepatic: Denies abdominal pain, nausea, vomiting, diarrhea, constipation or GI bleeding   Genitourinary: Denies bladder dysfunction, dysuria or frequency   Musculoskeletal/Rheum: Denies joint pain and swelling   Skin/Breast: Denies rash, denies breast lumps or discharge   Neurological: memory problems on July 1st 2017  Psychiatric: denies mood disorder   Endocrine: denies hx of diabetes or thyroid dysfunction   Heme/Oncology/Lymph Nodes: Denies enlarged lymph nodes, denies brusing or known bleeding disorder   Allergic/Immunologic: Denies hx of allergies         PHYSICAL AND NEUROLOGICAL EXMAINATIONS:  VITAL SIGNS: /70   Pulse (!) 102   Temp 36.5 °C (97.7 °F)   Resp 16   Ht 1.575 m (5' 2\")   Wt 61.5 kg (135 lb 8 oz)   SpO2 96%   BMI 24.78 kg/m²   CURRENT WEIGHT: BMI: Body mass index is 24.78 kg/m².  PREVIOUS WEIGHTS:  Wt Readings from Last 25 Encounters:   08/21/18 61.5 kg (135 lb 8 oz)   06/06/18 64.9 kg (143 lb)   04/26/18 68.5 kg (151 lb 0.2 oz)   03/27/18 68.2 kg (150 lb 6.4 oz)   03/23/18 67.6 kg (149 lb)   11/13/17 67.2 kg (148 lb 1.6 oz)   07/18/17 67.9 kg (149 lb 9.6 oz)   04/18/17 66.2 kg (145 lb 15.1 oz)   03/12/17 67 kg (147 lb 11.3 oz)   01/04/17 66.7 kg (147 lb)   08/29/16 68 kg (150 lb)   06/27/16 68.9 kg (152 lb)   06/08/16 70.8 kg (156 lb)   08/04/15 68.9 kg (152 lb)   04/17/15 68.9 kg (152 lb)   01/22/15 68.5 kg (151 lb)   12/15/14 67.6 kg (149 lb)   11/18/14 68 kg (150 lb)   10/17/14 69.7 kg (153 lb 9.6 oz)   09/11/14 72.1 kg (159 lb)   05/08/14 73.5 kg (162 lb)   10/21/13 68.9 kg (152 lb)   08/08/13 68 kg (150 lb)   07/25/13 70.3 kg (155 lb)   10/18/11 69.9 kg (154 lb)       General appearance of patient: WDWN(+) NAD(+)    EYES  o Fundus : Papilledem(-) Exudates(-) Hemorrhage(-)  Nervous System  Orientation to time, place and person(+)  Memory-- one day of " memory problem on the day of her daughter's     ________________________________________________________________________      We also asked the patient to copy the pentagons, draw clocks, writing  The patient's work will be scanned into the media section    ________________________________________________________________________    Language: aphasia(-)  Knowledge: past(+) Current(+)  Attention(+)  Cranial Nerves  • Nerve 2: intact  • Nerve 3,4,6: intact  • Nerve 5 : intact  • Nerve 7: intact  • Nerve 8: intact  • Nerve 9 & 10: intact  • Nerve 11: intact  • Nerve 12: intact  Muscle Power and muscle tone: symmetric, normal in upper and lower  Sensory System: Pin sensation intact(+)  Reflexes: symmetric throughout  Cerebellar Function FNP normal   Gait : Steady(+) TandemGait steady(+)  Heart and Vascular  Peripheral Vasucular system : Edema (-) Swelling(-)  RHB, Breathing sound clear  abdomen bowel sound normoactive  Extremities freely moveable  Joints no contracture       NEUROIMAGING: I reviewed the MRI/CT of brain     Grandmother had dementia-- started at the age of 80+  Aunt developed dementia at the age of 80+  Mother  at the age of 80+ due to cancer      Registration 3/3  Recall 1/3    Explained to the patient that in order to make the diagnosis of TGA ( transient global amnesia)    We need the following information from the witness    1. People with TGA tends to ask the same questions again and again during that TGA spell  2. The patient should not have shaking and head injury before this TGA event ( the rationale: the exclude the post-ictal amnesia and posttraumatic amnesia)    Please talk to the witness and call us regarding the contents of conversation the patient had with the witness and her family during that wedding ceremony at 2017        IMPRESSION:    1. One episode of  Memory loss at 2017--- she could not recall what happened afterwards  2. Hx of brain concussion   3. Hx of kidney  stones, Hypothyrodism, HTN  4. Mild cognitive impairment- decline since 2016 ( family hx of Dementia)  5. Spells of memory lapse ( superimposed small seizures on 4)    PLAN/RECOMMENDATIONS:      ________________________________________________________________________       This time, we will up the Namenda to 10mg two times per day for memory problems  Please reduce the dosage back to 5mg if any side effects    Also on top of Namenda, we will also add Keppra 125mg two times per day: the rationale is to use keppra to address those memory lapse  If any side effects, please stop and notify us    We may repeat MRI of brain in 2 years, we may repeat EEG if any more memory lapse ( please notify us  ________________________________________________________________________        We will see Yahan in 5 months      Regarding the dementia prevention-- We would advise the following  ________________________________________________________________________        Exercise muscle and brain    Weight watch    Quit alcohol altogether    Could try fasting 12 hours every other day    Fasting is a challenge to your brain, and we think that your brain reacts by activating adaptive stress responses that help it cope with disease.    Reduce anxiety by praying, yoga, meditation and etc    Eat healthy    ________________________________________________________________________    ________________________________________________________________________      Fasting is a challenge to your brain, and we think that your brain reacts by activating adaptive stress responses that help it cope with disease.  .  http://www.[employer].com/issues/spring-summer-2016/articles/are-there-any-proven-benefits-to-fasting    ________________________________________________________________________            SIGNATURE:  Bunny Sorto    CC:  Hector Neville M.D.  ROUTINE ELECTROENCEPHALOGRAM  REPORT          INTERPRETATION:      ________________________________________________________________________    This scalp EEG is consistent with mild focal cortical dysfunction over the left.     This remains nonspecific.     If clinical suspicion of seizure remains high.  Prolonged  EEG   monitoring may be of help.    ________________________________________________________________________                        7/2017  1. MRI OF THE BRAIN WITHOUT CONTRAST WITHIN NORMAL LIMITS FOR AGE WITH MILD ATROPHY AND MILD WHITE MATTER CHANGES.

## 2018-08-21 NOTE — PATIENT INSTRUCTIONS
IMPRESSION:    1. One episode of  Memory loss at July 1st 2017--- she could not recall what happened afterwards  2. Hx of brain concussion   3. Hx of kidney stones, Hypothyrodism, HTN  4. Mild cognitive impairment- decline since 2016 ( family hx of Dementia)  5. Spells of memory lapse ( superimposed small seizures on 4)    PLAN/RECOMMENDATIONS:      ________________________________________________________________________       This time, we will up the Namenda to 10mg two times per day for memory problems  Please reduce the dosage back to 5mg if any side effects    Also on top of Namenda, we will also add Keppra 125mg two times per day: the rationale is to use keppra to address those memory lapse  If any side effects, please stop and notify us    We may repeat MRI of brain in 2 years, we may repeat EEG if any more memory lapse ( please notify us  ________________________________________________________________________        We will see Emily in 5 months      Regarding the dementia prevention-- We would advise the following  ________________________________________________________________________

## 2018-09-26 DIAGNOSIS — E78.5 DYSLIPIDEMIA: ICD-10-CM

## 2018-09-26 RX ORDER — SIMVASTATIN 40 MG
TABLET ORAL
Qty: 90 TAB | Refills: 3 | Status: SHIPPED | OUTPATIENT
Start: 2018-09-26 | End: 2019-03-27 | Stop reason: SDUPTHER

## 2018-09-28 ENCOUNTER — OFFICE VISIT (OUTPATIENT)
Dept: MEDICAL GROUP | Facility: MEDICAL CENTER | Age: 78
End: 2018-09-28
Payer: MEDICARE

## 2018-09-28 VITALS
DIASTOLIC BLOOD PRESSURE: 82 MMHG | RESPIRATION RATE: 16 BRPM | TEMPERATURE: 97.5 F | HEIGHT: 62 IN | SYSTOLIC BLOOD PRESSURE: 126 MMHG | WEIGHT: 132 LBS | BODY MASS INDEX: 24.29 KG/M2 | HEART RATE: 100 BPM | OXYGEN SATURATION: 95 %

## 2018-09-28 DIAGNOSIS — E78.5 DYSLIPIDEMIA: ICD-10-CM

## 2018-09-28 DIAGNOSIS — G89.29 CHRONIC PAIN OF LEFT KNEE: ICD-10-CM

## 2018-09-28 DIAGNOSIS — M25.562 CHRONIC PAIN OF LEFT KNEE: ICD-10-CM

## 2018-09-28 DIAGNOSIS — R73.02 IGT (IMPAIRED GLUCOSE TOLERANCE): ICD-10-CM

## 2018-09-28 DIAGNOSIS — E03.9 ACQUIRED HYPOTHYROIDISM: ICD-10-CM

## 2018-09-28 PROCEDURE — 99214 OFFICE O/P EST MOD 30 MIN: CPT | Performed by: INTERNAL MEDICINE

## 2018-09-28 NOTE — PROGRESS NOTES
CC: Follow-up blood sugars, thyroid, knee pain.    HPI:   Jill presents today with the following.    1. IGT (impaired glucose tolerance)  Presents having been given a lab slip which she did not complete approximately 8 months ago.  She did have an A1c done in the hospital that was at 6.  She has lost a significant amount of weight since February.  She does not have a previous history of diabetes is not been on medications.    2. Acquired hypothyroidism  She is also given a thyroid test maintain on Synthroid.  Given that she is lost a significant amount of weight certainly concerned that she is over replaced potentially.    3. Dyslipidemia  Maintain on statin without myalgia again due for blood work.    4. Chronic pain of left knee  She is status post surgery approximately 3 months ago having persistent knee pain requesting a handicap placard.  She is reporting that symptoms are improving with time.      Patient Active Problem List    Diagnosis Date Noted   • Acquired hypothyroidism 05/08/2014     Priority: Medium     Class: Chronic   • IGT (impaired glucose tolerance) 05/08/2014     Priority: Medium     Class: Chronic   • Essential hypertension 07/05/2011     Priority: Medium   • Dyslipidemia 07/05/2011     Priority: Medium   • Tachycardia 06/06/2018   • Primary osteoarthritis of left knee 04/26/2018   • Mild cognitive disorder 03/27/2018   • Vitamin deficiency 03/27/2018   • Spell of memory loss 11/13/2017   • Gastroesophageal reflux disease without esophagitis 08/29/2016   • Chronic pain of left knee 06/08/2016       Current Outpatient Prescriptions   Medication Sig Dispense Refill   • simvastatin (ZOCOR) 40 MG Tab TAKE ONE TABLET BY MOUTH ONCE DAILY IN THE EVENING 90 Tab 3   • memantine (NAMENDA) 10 MG Tab Take 1 Tab by mouth 2 times a day. 180 Tab 1   • levETIRAcetam (KEPPRA) 250 MG tablet Take 0.5 Tabs by mouth 2 times a day. 30 Tab 6   • metoprolol SR (TOPROL XL) 25 MG TABLET SR 24 HR Take 1 Tab by mouth  "every day. 90 Tab 3   • acetaminophen (TYLENOL) 500 MG Tab Take 1.5 Tabs by mouth every 6 hours. 30 Tab 0   • aspirin EC 81 MG EC tablet Take 1 Tab by mouth 2 times a day. 60 Tab 0   • Cyanocobalamin (VITAMIN B-12) 1000 MCG Tab Take 1,000 mcg by mouth every day.     • omeprazole (PRILOSEC) 20 MG delayed-release capsule Take 1 Cap by mouth every day. 90 Cap 4   • furosemide (LASIX) 20 MG Tab TAKE ONE TABLET BY MOUTH ONCE DAILY 90 Tab 3   • levothyroxine (SYNTHROID) 88 MCG Tab Take 0.5 Tabs by mouth Every morning on an empty stomach. 45 Tab 11   • potassium chloride ER (KLOR-CON) 10 MEQ tablet TAKE ONE TABLET BY MOUTH TWICE DAILY (Patient taking differently: 10 mEq every day. TAKE ONE TABLET BY MOUTH TWICE DAILY) 180 Tab 3   • VITAMIN D, CHOLECALCIFEROL, PO Take 2,000 Units by mouth every day.     • ondansetron (ZOFRAN ODT) 4 MG TABLET DISPERSIBLE Take 1 Tab by mouth every four hours as needed for Nausea. (Patient not taking: Reported on 8/21/2018) 10 Tab 0   • docusate sodium 100 MG Cap Take 100 mg by mouth 2 Times a Day. (Patient not taking: Reported on 8/21/2018) 60 Cap 0     No current facility-administered medications for this visit.          Allergies as of 09/28/2018 - Reviewed 09/28/2018   Allergen Reaction Noted   • Nsaids  07/05/2011   • Demerol Vomiting 11/14/2011        ROS: Denies Chest pain, SOB, LE edema.    /82   Pulse 100   Temp 36.4 °C (97.5 °F)   Resp 16   Ht 1.57 m (5' 1.81\")   Wt 59.9 kg (132 lb)   SpO2 95%   BMI 24.29 kg/m²     Physical Exam:  Gen:         Alert and oriented, No apparent distress.  Neck:        No Lymphadenopathy or Bruits.  Lungs:     Clear to auscultation bilaterally  CV:          Regular rate and rhythm. No murmurs, rubs or gallops.               Ext:          No clubbing, cyanosis, edema.      Assessment and Plan.   78 y.o. female with the following issues.    1. IGT (impaired glucose tolerance)  Rechecking blood work  - HEMOGLOBIN A1C; Future    2. Acquired " hypothyroidism  Again discussed the importance of routine labs checking thyroid  - TSH; Future    3. Dyslipidemia  Recheck cholesterol  - COMP METABOLIC PANEL; Future  - LIPID PROFILE; Future    4. Chronic pain of left knee  Have given a handicap placard.

## 2018-10-01 ENCOUNTER — HOSPITAL ENCOUNTER (OUTPATIENT)
Dept: LAB | Facility: MEDICAL CENTER | Age: 78
End: 2018-10-01
Attending: INTERNAL MEDICINE
Payer: MEDICARE

## 2018-10-01 DIAGNOSIS — E03.9 ACQUIRED HYPOTHYROIDISM: ICD-10-CM

## 2018-10-01 DIAGNOSIS — E78.5 DYSLIPIDEMIA: ICD-10-CM

## 2018-10-01 DIAGNOSIS — R73.02 IGT (IMPAIRED GLUCOSE TOLERANCE): ICD-10-CM

## 2018-10-01 LAB
ALBUMIN SERPL BCP-MCNC: 4.4 G/DL (ref 3.2–4.9)
ALBUMIN/GLOB SERPL: 1.4 G/DL
ALP SERPL-CCNC: 51 U/L (ref 30–99)
ALT SERPL-CCNC: 12 U/L (ref 2–50)
ANION GAP SERPL CALC-SCNC: 6 MMOL/L (ref 0–11.9)
AST SERPL-CCNC: 16 U/L (ref 12–45)
BILIRUB SERPL-MCNC: 0.6 MG/DL (ref 0.1–1.5)
BUN SERPL-MCNC: 23 MG/DL (ref 8–22)
CALCIUM SERPL-MCNC: 10.3 MG/DL (ref 8.5–10.5)
CHLORIDE SERPL-SCNC: 106 MMOL/L (ref 96–112)
CHOLEST SERPL-MCNC: 146 MG/DL (ref 100–199)
CO2 SERPL-SCNC: 30 MMOL/L (ref 20–33)
CREAT SERPL-MCNC: 1.14 MG/DL (ref 0.5–1.4)
EST. AVERAGE GLUCOSE BLD GHB EST-MCNC: 126 MG/DL
FASTING STATUS PATIENT QL REPORTED: NORMAL
GLOBULIN SER CALC-MCNC: 3.1 G/DL (ref 1.9–3.5)
GLUCOSE SERPL-MCNC: 107 MG/DL (ref 65–99)
HBA1C MFR BLD: 6 % (ref 0–5.6)
HDLC SERPL-MCNC: 48 MG/DL
LDLC SERPL CALC-MCNC: 74 MG/DL
POTASSIUM SERPL-SCNC: 4.4 MMOL/L (ref 3.6–5.5)
PROT SERPL-MCNC: 7.5 G/DL (ref 6–8.2)
SODIUM SERPL-SCNC: 142 MMOL/L (ref 135–145)
TRIGL SERPL-MCNC: 121 MG/DL (ref 0–149)
TSH SERPL DL<=0.005 MIU/L-ACNC: 0.95 UIU/ML (ref 0.38–5.33)

## 2018-10-01 PROCEDURE — 80061 LIPID PANEL: CPT

## 2018-10-01 PROCEDURE — 84443 ASSAY THYROID STIM HORMONE: CPT

## 2018-10-01 PROCEDURE — 83036 HEMOGLOBIN GLYCOSYLATED A1C: CPT

## 2018-10-01 PROCEDURE — 36415 COLL VENOUS BLD VENIPUNCTURE: CPT

## 2018-10-01 PROCEDURE — 80053 COMPREHEN METABOLIC PANEL: CPT

## 2018-10-14 DIAGNOSIS — I10 ESSENTIAL HYPERTENSION: ICD-10-CM

## 2018-10-15 DIAGNOSIS — I10 ESSENTIAL HYPERTENSION: ICD-10-CM

## 2018-10-15 RX ORDER — FUROSEMIDE 20 MG/1
TABLET ORAL
Qty: 90 TAB | Refills: 3 | Status: SHIPPED | OUTPATIENT
Start: 2018-10-15 | End: 2018-10-15 | Stop reason: SDUPTHER

## 2018-10-15 RX ORDER — FUROSEMIDE 20 MG/1
20 TABLET ORAL DAILY
Qty: 90 TAB | Refills: 3 | Status: SHIPPED | OUTPATIENT
Start: 2018-10-15 | End: 2019-09-09 | Stop reason: SDUPTHER

## 2018-11-13 NOTE — PATIENT INSTRUCTIONS
IMPRESSION:    1. One episode of  Memory loss at July 1st 2017--- she could not recall what happened afterwards  2. Hx of brain concussion   3. Hx of kidney stones, Hypothyrodism, HTN  4. Mild cognitive decline for one year or so ( family hx of Dementia)    PLAN/RECOMMENDATIONS:      Explained to the patient that in order to make the diagnosis of TGA ( transient global amnesia)    We need the following information from the witness    1. People with TGA tends to ask the same questions again and again during that TGA spell  2. The patient should not have shaking and head injury before this TGA event ( the rationale: the exclude the post-ictal amnesia and posttraumatic amnesia)    Please talk to the witness and call us regarding the contents of conversation the patient had with the witness and her family during that wedding ceremony at July 1st 2017      In the meantime, we will offer EEG       Regarding the dementia prevention-- We would advise the following  ________________________________________________________________________        Exercise muscle and brain    Weight watch    Quit alcohol altogether    Could try fasting 12 hours every other day    Fasting is a challenge to your brain, and we think that your brain reacts by activating adaptive stress responses that help it cope with disease.    Reduce anxiety by praying, yoga, meditation and etc    Eat healthy    If memory continue to deteriorate, please call us again  ________________________________________________________________________    ________________________________________________________________________      Fasting is a challenge to your brain, and we think that your brain reacts by activating adaptive stress responses that help it cope with  disease.  .  http://www.[employer].com/issues/spring-summer-2016/articles/are-there-any-proven-benefits-to-fasting    ________________________________________________________________________              SIGNATURE:  Bunny Sorto    CC:  Hector Neville M.D.      7/2017  1. MRI OF THE BRAIN WITHOUT CONTRAST WITHIN NORMAL LIMITS FOR AGE WITH MILD ATROPHY AND MILD WHITE MATTER CHANGES.         Cryotherapy Text: The wound bed was treated with cryotherapy after the biopsy was performed.

## 2018-11-29 RX ORDER — POTASSIUM CHLORIDE 750 MG/1
TABLET, FILM COATED, EXTENDED RELEASE ORAL
Qty: 180 TAB | Refills: 3 | Status: SHIPPED | OUTPATIENT
Start: 2018-11-29 | End: 2020-02-05

## 2019-01-02 ENCOUNTER — OFFICE VISIT (OUTPATIENT)
Dept: NEUROLOGY | Facility: MEDICAL CENTER | Age: 79
End: 2019-01-02
Payer: MEDICARE

## 2019-01-02 VITALS
HEIGHT: 61 IN | BODY MASS INDEX: 25.86 KG/M2 | TEMPERATURE: 97.1 F | HEART RATE: 81 BPM | RESPIRATION RATE: 16 BRPM | WEIGHT: 137 LBS | OXYGEN SATURATION: 94 % | DIASTOLIC BLOOD PRESSURE: 74 MMHG | SYSTOLIC BLOOD PRESSURE: 124 MMHG

## 2019-01-02 DIAGNOSIS — F09 MILD COGNITIVE DISORDER: ICD-10-CM

## 2019-01-02 DIAGNOSIS — F32.1 CURRENT MODERATE EPISODE OF MAJOR DEPRESSIVE DISORDER, UNSPECIFIED WHETHER RECURRENT (HCC): ICD-10-CM

## 2019-01-02 PROBLEM — F32.A DEPRESSION: Status: ACTIVE | Noted: 2019-01-02

## 2019-01-02 PROCEDURE — 99214 OFFICE O/P EST MOD 30 MIN: CPT | Performed by: PSYCHIATRY & NEUROLOGY

## 2019-01-02 RX ORDER — MEMANTINE HYDROCHLORIDE 10 MG/1
10 TABLET ORAL 2 TIMES DAILY
Qty: 180 TAB | Refills: 1 | Status: SHIPPED | OUTPATIENT
Start: 2019-01-02 | End: 2019-07-31 | Stop reason: SDUPTHER

## 2019-01-02 RX ORDER — ESCITALOPRAM OXALATE 10 MG/1
10 TABLET ORAL DAILY
Qty: 30 TAB | Refills: 6 | Status: SHIPPED | OUTPATIENT
Start: 2019-01-02 | End: 2019-05-05 | Stop reason: SDUPTHER

## 2019-01-02 ASSESSMENT — PATIENT HEALTH QUESTIONNAIRE - PHQ9
CLINICAL INTERPRETATION OF PHQ2 SCORE: 6
SUM OF ALL RESPONSES TO PHQ QUESTIONS 1-9: 13
5. POOR APPETITE OR OVEREATING: 0 - NOT AT ALL

## 2019-01-02 NOTE — PROGRESS NOTES
NEUROLOGY NOTE    Referring Physician  Hector Neville M.D.      CHIEF COMPLAINT:  July 1st 2017--she lost the memory for that day  Her grand daughter's marriage date ( July 1st 2017)  Since last visit, the patient noticed more depression ( after keppra?)  Chief Complaint   Patient presents with   • Follow-Up     mild cognitive disorder       PRESENT ILLNESS:   Since last visit, the patient noticed more depression ( after keppra?)  July 1st 2017--she lost the memory for that day  Her grand daughter's marriage date ( July 1st 2017)      Besides that, the patient noticed a couple of events that she had memory problems  Like she could not recall what she did in one morning-- like watering plants etc  At times , she had trouble findings words-- her  has hearing problem    Memory deteriorating since 2016        RED FLAGS for reversible dementia  Headache X  Fluctuation course V  Tremor X  Rapid Progressive onset ( within 2 years) X  MRI hippocampus not atrophy X          PAST MEDICAL HISTORY:  Past Medical History:   Diagnosis Date   • Arrhythmia     PAT, last episode 11/2015; cardiologist, Dr. Hearn   • Arthritis    • Bowel habit changes     constipation/ diarrhea   • Cancer (HCC) 2004    squamous cell skin   • Chronic pain of left knee 6/8/2016   • Dental disorder     implants   • Heart burn    • Hiatus hernia syndrome    • High cholesterol    • Hyperlipidemia 7/5/2011   • Hypertension 7/5/2011   • Indigestion    • Kidney stones last 2014   • Mild cognitive disorder    • Pneumonia 2007   • Psychiatric problem     depression   • Renal disorder 2009    stones   • Unspecified urinary incontinence     mild   • Urinary bladder disorder        PAST SURGICAL HISTORY:  Past Surgical History:   Procedure Laterality Date   • KNEE ARTHROPLASTY TOTAL Left 4/25/2018    Procedure: KNEE ARTHROPLASTY TOTAL;  Surgeon: Micaela Judd M.D.;  Location: SURGERY [facility];  Service: Orthopedics   • COLPOSACROPEXY ROBOTIC   11/14/2011    Performed by [facility] at SURGERY [facility] ORS   • CYSTOSCOPY  11/14/2011    Performed by [facility] at SURGERY [facility]   • HYSTERECTOMY, TOTAL ABDOMINAL  1989   • CHOLECYSTECTOMY  1971   • BLADDER SLING FEMALE  approx 2008 & 2010    bladder slings x2   • CYSTOSCOPY  1990's    bladder stones       FAMILY HISTORY:  Family History   Problem Relation Age of Onset   • Lung Disease Father    • Heart Disease Father    • Cancer Mother    • Arrythmia Sister        SOCIAL HISTORY:  Social History     Social History   • Marital status:      Spouse name: N/A   • Number of children: N/A   • Years of education: N/A     Occupational History   • Not on file.     Social History Main Topics   • Smoking status: Former Smoker     Packs/day: 1.00     Years: 12.00     Quit date: 1/1/1975   • Smokeless tobacco: Never Used   • Alcohol use 0.0 oz/week      Comment: twice a year   • Drug use: No   • Sexual activity: Not Currently     Other Topics Concern   • Not on file     Social History Narrative   • No narrative on file     ALLERGIES:  Allergies   Allergen Reactions   • Nsaids      Ankle swelling and joint pain   • Demerol Vomiting     TOBHX  History   Smoking Status   • Former Smoker   • Packs/day: 1.00   • Years: 12.00   • Quit date: 1/1/1975   Smokeless Tobacco   • Never Used     ALCHX  History   Alcohol Use   • 0.0 oz/week     Comment: twice a year     DRUGHX  History   Drug Use No           MEDICATIONS:  Current Outpatient Prescriptions   Medication   • potassium chloride ER (KLOR-CON) 10 MEQ tablet   • furosemide (LASIX) 20 MG Tab   • simvastatin (ZOCOR) 40 MG Tab   • memantine (NAMENDA) 10 MG Tab   • levETIRAcetam (KEPPRA) 250 MG tablet   • metoprolol SR (TOPROL XL) 25 MG TABLET SR 24 HR   • aspirin EC 81 MG EC tablet   • Cyanocobalamin (VITAMIN B-12) 1000 MCG Tab   • omeprazole (PRILOSEC) 20 MG delayed-release capsule   • levothyroxine (SYNTHROID) 88 MCG Tab   • VITAMIN D,  "CHOLECALCIFEROL, PO   • acetaminophen (TYLENOL) 500 MG Tab   • ondansetron (ZOFRAN ODT) 4 MG TABLET DISPERSIBLE   • docusate sodium 100 MG Cap     No current facility-administered medications for this visit.        REVIEW OF SYSTEM:    Constitutional: Denies fevers, Denies weight changes   Eyes: Denies changes in vision, no eye pain   Ears/Nose/Throat/Mouth: Denies nasal congestion or sore throat   Cardiovascular: Denies chest pain or palpitations   Respiratory: Denies SOB.   Gastrointestinal/Hepatic: Denies abdominal pain, nausea, vomiting, diarrhea, constipation or GI bleeding   Genitourinary: Denies bladder dysfunction, dysuria or frequency   Musculoskeletal/Rheum: Denies joint pain and swelling   Skin/Breast: Denies rash, denies breast lumps or discharge   Neurological: memory problems on July 1st 2017  Psychiatric: denies mood disorder   Endocrine: denies hx of diabetes or thyroid dysfunction   Heme/Oncology/Lymph Nodes: Denies enlarged lymph nodes, denies brusing or known bleeding disorder   Allergic/Immunologic: Denies hx of allergies         PHYSICAL AND NEUROLOGICAL EXMAINATIONS:  VITAL SIGNS: /74 (BP Location: Right arm, Patient Position: Sitting, BP Cuff Size: Adult)   Pulse 81   Temp 36.2 °C (97.1 °F) (Temporal)   Resp 16   Ht 1.549 m (5' 1\")   Wt 62.1 kg (137 lb)   SpO2 94%   BMI 25.89 kg/m²   CURRENT WEIGHT: BMI: Body mass index is 25.89 kg/m².  PREVIOUS WEIGHTS:  Wt Readings from Last 25 Encounters:   01/02/19 62.1 kg (137 lb)   09/28/18 59.9 kg (132 lb)   08/21/18 61.5 kg (135 lb 8 oz)   06/06/18 64.9 kg (143 lb)   04/26/18 68.5 kg (151 lb 0.2 oz)   03/27/18 68.2 kg (150 lb 6.4 oz)   03/23/18 67.6 kg (149 lb)   11/13/17 67.2 kg (148 lb 1.6 oz)   07/18/17 67.9 kg (149 lb 9.6 oz)   04/18/17 66.2 kg (145 lb 15.1 oz)   03/12/17 67 kg (147 lb 11.3 oz)   01/04/17 66.7 kg (147 lb)   08/29/16 68 kg (150 lb)   06/27/16 68.9 kg (152 lb)   06/08/16 70.8 kg (156 lb)   08/04/15 68.9 kg (152 lb)   "   04/17/15 68.9 kg (152 lb)   01/22/15 68.5 kg (151 lb)   12/15/14 67.6 kg (149 lb)   14 68 kg (150 lb)   10/17/14 69.7 kg (153 lb 9.6 oz)   14 72.1 kg (159 lb)   14 73.5 kg (162 lb)   10/21/13 68.9 kg (152 lb)   13 68 kg (150 lb)       General appearance of patient: WDWN(+) NAD(+)    EYES  o Fundus : Papilledem(-) Exudates(-) Hemorrhage(-)  Nervous System  Orientation to time, place and person(+)  Memory-- one day of memory problem on the day of her daughter's     ________________________________________________________________________      We also asked the patient to copy the pentagons, draw clocks, writing  The patient's work will be scanned into the media section    ________________________________________________________________________    Language: aphasia(-)  Knowledge: past(+) Current(+)  Attention(+)  Cranial Nerves  • Nerve 2: intact  • Nerve 3,4,6: intact  • Nerve 5 : intact  • Nerve 7: intact  • Nerve 8: intact  • Nerve 9 & 10: intact  • Nerve 11: intact  • Nerve 12: intact  Muscle Power and muscle tone: symmetric, normal in upper and lower  Sensory System: Pin sensation intact(+)  Reflexes: symmetric throughout  Cerebellar Function FNP normal   Gait : Steady(+) TandemGait steady(+)  Heart and Vascular  Peripheral Vasucular system : Edema (-) Swelling(-)  RHB, Breathing sound clear  abdomen bowel sound normoactive  Extremities freely moveable  Joints no contracture       NEUROIMAGING: I reviewed the MRI/CT of brain     Grandmother had dementia-- started at the age of 80+  Aunt developed dementia at the age of 80+  Mother  at the age of 80+ due to cancer      Registration 3/3  Recall 1/3    Explained to the patient that in order to make the diagnosis of TGA ( transient global amnesia)    We need the following information from the witness    1. People with TGA tends to ask the same questions again and again during that TGA spell  2. The patient should not have shaking and head  injury before this TGA event ( the rationale: the exclude the post-ictal amnesia and posttraumatic amnesia)    Please talk to the witness and call us regarding the contents of conversation the patient had with the witness and her family during that wedding ceremony at July 1st 2017    If the depression is under good control in the future, we may try keppra again    IMPRESSION:    1. One episode of  Memory loss at July 1st 2017--- she could not recall what happened afterwards  2. Hx of brain concussion   3. Hx of kidney stones, Hypothyrodism, HTN  4. Mild cognitive impairment- decline since 2016 ( family hx of Dementia)  5. Spells of memory lapse ( superimposed small seizures? on 4)  6. Depression    PLAN/RECOMMENDATIONS:      Depression worsened --- we will offer Lexapro 10mg daily   If any side effects, please stop\  In case of any suicidal ideas , please go to ER or contact PCP   ________________________________________________________________________       Continue Namenda to 10mg two times per day for memory problems    We may repeat MRI of brain in July 2019, we may repeat EEG if any more memory lapse ( please notify us)  ________________________________________________________________________        We will see Emily in 6 months      Regarding the dementia prevention-- We would advise the following  ________________________________________________________________________        Exercise muscle and brain    Weight watch    Quit alcohol altogether    Could try fasting 12 hours every other day    Fasting is a challenge to your brain, and we think that your brain reacts by activating adaptive stress responses that help it cope with disease.    Reduce anxiety by praying, yoga, meditation and etc    Eat healthy    ________________________________________________________________________    ________________________________________________________________________      Fasting is a challenge to your brain, and we think  that your brain reacts by activating adaptive stress responses that help it cope with disease.  .  [email or web address]/spring-[email or web address]-[email or web address]/are-there-any-proven-benefits-to-fasting    ________________________________________________________________________            SIGNATURE:  Bunny Sorto    CC:  Hector Neville M.D.  ROUTINE ELECTROENCEPHALOGRAM REPORT          INTERPRETATION:      ________________________________________________________________________    This scalp EEG is consistent with mild focal cortical dysfunction over the left.     This remains nonspecific.     If clinical suspicion of seizure remains high.  Prolonged  EEG   monitoring may be of help.    ________________________________________________________________________                        7/2017  1. MRI OF THE BRAIN WITHOUT CONTRAST WITHIN NORMAL LIMITS FOR AGE WITH MILD ATROPHY AND MILD WHITE MATTER CHANGES.

## 2019-01-02 NOTE — PATIENT INSTRUCTIONS
IMPRESSION:     1. One episode of  Memory loss at July 1st 2017--- she could not recall what happened afterwards  2. Hx of brain concussion   3. Hx of kidney stones, Hypothyrodism, HTN  4. Mild cognitive impairment- decline since 2016 ( family hx of Dementia)  5. Spells of memory lapse ( superimposed small seizures? on 4)  6. Depression     PLAN/RECOMMENDATIONS:        Depression worsened --- we will offer Lexapro 10mg daily   If any side effects, please stop\  In case of any suicidal ideas , please go to ER or contact PCP   ________________________________________________________________________        Continue Namenda to 10mg two times per day for memory problems     We may repeat MRI of brain in July 2019, we may repeat EEG if any more memory lapse ( please notify us)  ________________________________________________________________________           We will see Emily in 6 months

## 2019-03-26 DIAGNOSIS — K21.9 GASTROESOPHAGEAL REFLUX DISEASE WITHOUT ESOPHAGITIS: ICD-10-CM

## 2019-03-26 RX ORDER — OMEPRAZOLE 20 MG/1
CAPSULE, DELAYED RELEASE ORAL
Qty: 90 CAP | Refills: 0 | Status: SHIPPED | OUTPATIENT
Start: 2019-03-26 | End: 2019-07-15 | Stop reason: SDUPTHER

## 2019-03-27 DIAGNOSIS — E78.5 DYSLIPIDEMIA: ICD-10-CM

## 2019-03-27 RX ORDER — SIMVASTATIN 40 MG
TABLET ORAL
Qty: 100 TAB | Refills: 3 | Status: SHIPPED | OUTPATIENT
Start: 2019-03-27 | End: 2020-05-12

## 2019-03-27 NOTE — TELEPHONE ENCOUNTER
Was the patient seen in the last year in this department? Yes    Does patient have an active prescription for medications requested? Yes    Received Request Via: Patient     100 day refill request

## 2019-07-15 DIAGNOSIS — K21.9 GASTROESOPHAGEAL REFLUX DISEASE WITHOUT ESOPHAGITIS: ICD-10-CM

## 2019-07-15 RX ORDER — OMEPRAZOLE 20 MG/1
20 CAPSULE, DELAYED RELEASE ORAL DAILY
Qty: 90 CAP | Refills: 0 | Status: SHIPPED | OUTPATIENT
Start: 2019-07-15 | End: 2019-07-19 | Stop reason: SDUPTHER

## 2019-07-19 DIAGNOSIS — K21.9 GASTROESOPHAGEAL REFLUX DISEASE WITHOUT ESOPHAGITIS: ICD-10-CM

## 2019-07-19 RX ORDER — OMEPRAZOLE 20 MG/1
20 CAPSULE, DELAYED RELEASE ORAL DAILY
Qty: 100 CAP | Refills: 2 | Status: SHIPPED | OUTPATIENT
Start: 2019-07-19 | End: 2020-08-10

## 2019-08-02 RX ORDER — MEMANTINE HYDROCHLORIDE 10 MG/1
TABLET ORAL
Qty: 200 TAB | Refills: 0 | Status: SHIPPED
Start: 2019-08-02 | End: 2020-01-20 | Stop reason: CLARIF

## 2019-08-02 RX ORDER — MEMANTINE HYDROCHLORIDE 10 MG/1
TABLET ORAL
Qty: 180 TAB | Refills: 1 | Status: SHIPPED | OUTPATIENT
Start: 2019-08-02 | End: 2019-08-02 | Stop reason: SDUPTHER

## 2019-08-02 NOTE — TELEPHONE ENCOUNTER
Was the patient seen in the last year in this department? Yes    Does patient have an active prescription for medications requested? Yes    Received Request Via: Pharmacy     Pt has appt to establish care with Dr. Verdin 11/13/19.

## 2019-08-27 ENCOUNTER — PATIENT OUTREACH (OUTPATIENT)
Dept: HEALTH INFORMATION MANAGEMENT | Facility: OTHER | Age: 79
End: 2019-08-27

## 2019-08-27 NOTE — PROGRESS NOTES
1. HealthConnect Verified: yes    2. Verify PCP: yes    3. Review and add  to Care Team: yes    4. WebIZ Checked & Epic Updated: No WebIZ record  WebIZ Recommendations: Patient is up to date on all vaccines  Is patient due for Tdap? NO  Is patient due for Shingles? NO    5. Reviewed/Updated the following with patient:       •   Communication Preference Obtained? YES  • [employer] Activation: already active       •   E-Mail Address Obtained? YES       •   Appointment Day and Time Preferences? YES       •   Preferred Pharmacy? YES       •   Preferred Lab? YES    6. Care Gap Scheduling (Attempt to Schedule EACH Overdue Care Gap!)    Scheduled patient for Annual Wellness Visit

## 2019-08-29 DIAGNOSIS — R00.0 TACHYCARDIA: ICD-10-CM

## 2019-08-29 RX ORDER — METOPROLOL SUCCINATE 25 MG/1
TABLET, EXTENDED RELEASE ORAL
Qty: 90 TAB | Refills: 3 | Status: SHIPPED | OUTPATIENT
Start: 2019-08-29 | End: 2020-08-24

## 2019-09-06 PROBLEM — E56.9 VITAMIN DEFICIENCY: Status: RESOLVED | Noted: 2018-03-27 | Resolved: 2019-09-06

## 2019-09-06 PROBLEM — R41.3 SPELL OF MEMORY LOSS: Status: RESOLVED | Noted: 2017-11-13 | Resolved: 2019-09-06

## 2019-09-06 PROBLEM — R00.0 TACHYCARDIA: Status: RESOLVED | Noted: 2018-06-06 | Resolved: 2019-09-06

## 2019-09-09 ENCOUNTER — OFFICE VISIT (OUTPATIENT)
Dept: MEDICAL GROUP | Facility: MEDICAL CENTER | Age: 79
End: 2019-09-09
Payer: MEDICARE

## 2019-09-09 VITALS
OXYGEN SATURATION: 91 % | TEMPERATURE: 97.9 F | BODY MASS INDEX: 27.94 KG/M2 | DIASTOLIC BLOOD PRESSURE: 70 MMHG | WEIGHT: 148 LBS | HEART RATE: 88 BPM | HEIGHT: 61 IN | SYSTOLIC BLOOD PRESSURE: 114 MMHG

## 2019-09-09 DIAGNOSIS — Z00.00 MEDICARE ANNUAL WELLNESS VISIT, SUBSEQUENT: ICD-10-CM

## 2019-09-09 DIAGNOSIS — F32.1 CURRENT MODERATE EPISODE OF MAJOR DEPRESSIVE DISORDER, UNSPECIFIED WHETHER RECURRENT (HCC): ICD-10-CM

## 2019-09-09 DIAGNOSIS — K21.9 GASTROESOPHAGEAL REFLUX DISEASE WITHOUT ESOPHAGITIS: ICD-10-CM

## 2019-09-09 DIAGNOSIS — E03.9 ACQUIRED HYPOTHYROIDISM: ICD-10-CM

## 2019-09-09 DIAGNOSIS — R73.02 IGT (IMPAIRED GLUCOSE TOLERANCE): ICD-10-CM

## 2019-09-09 DIAGNOSIS — I47.10 SVT (SUPRAVENTRICULAR TACHYCARDIA): ICD-10-CM

## 2019-09-09 DIAGNOSIS — G31.84 MILD COGNITIVE IMPAIRMENT: ICD-10-CM

## 2019-09-09 DIAGNOSIS — F09 MILD COGNITIVE DISORDER: ICD-10-CM

## 2019-09-09 DIAGNOSIS — E78.5 DYSLIPIDEMIA: ICD-10-CM

## 2019-09-09 DIAGNOSIS — I10 ESSENTIAL HYPERTENSION: ICD-10-CM

## 2019-09-09 LAB
HBA1C MFR BLD: 5.7 % (ref 0–5.6)
INT CON NEG: NEGATIVE
INT CON POS: POSITIVE

## 2019-09-09 PROCEDURE — 99214 OFFICE O/P EST MOD 30 MIN: CPT | Mod: 25 | Performed by: INTERNAL MEDICINE

## 2019-09-09 PROCEDURE — G0439 PPPS, SUBSEQ VISIT: HCPCS | Performed by: INTERNAL MEDICINE

## 2019-09-09 PROCEDURE — 8041 PR SCP AHA: Performed by: INTERNAL MEDICINE

## 2019-09-09 PROCEDURE — 83036 HEMOGLOBIN GLYCOSYLATED A1C: CPT | Performed by: INTERNAL MEDICINE

## 2019-09-09 RX ORDER — FUROSEMIDE 20 MG/1
20 TABLET ORAL DAILY
Qty: 100 TAB | Refills: 3 | Status: SHIPPED | OUTPATIENT
Start: 2019-09-09 | End: 2020-07-08

## 2019-09-09 RX ORDER — LEVOTHYROXINE SODIUM 88 UG/1
44 TABLET ORAL
Qty: 45 TAB | Refills: 3 | Status: SHIPPED | OUTPATIENT
Start: 2019-09-09 | End: 2020-09-11 | Stop reason: SDUPTHER

## 2019-09-09 ASSESSMENT — PATIENT HEALTH QUESTIONNAIRE - PHQ9: CLINICAL INTERPRETATION OF PHQ2 SCORE: 0

## 2019-09-09 ASSESSMENT — ACTIVITIES OF DAILY LIVING (ADL): BATHING_REQUIRES_ASSISTANCE: 0

## 2019-09-09 ASSESSMENT — ENCOUNTER SYMPTOMS: GENERAL WELL-BEING: GOOD

## 2019-09-09 NOTE — PROGRESS NOTES
Annual Health Assessment Questions:    1.  Are you currently engaging in any exercise or physical activity? No    2.  How would you describe your mood or emotional well-being today? Tired    3.  Have you had any falls in the last year? No    4.  Have you noticed any problems with your balance or had difficulty walking? Yes    5.  In the last six months have you experienced any leakage of urine? Yes    6. DPA/Advanced Directive: Patient does not have an Advanced Directive.  A packet and workshop information was given on Advanced Directives.          CC: Low blood sugars, hypertension, thyroid   due for wellness examination.                                                                                                                                   HPI:   Jill presents today with the following.    1. Medicare annual wellness visit, subsequent  Screenings performed below information given on advanced directives    2. IGT (impaired glucose tolerance)  Blood sugars elevated in the past checked in office today with A1c less than 5.6.  She is try to work with diet and exercise.    3. Essential hypertension  Well-controlled on current regimen denying any dizziness.    4. Dyslipidemia  Maintain on statin without myalgia overdue for recheck.    5. Acquired hypothyroidism  Obtained on on thyroid medication very low dose denying any changes to hair skin again due for recheck.    Depression Screening    Little interest or pleasure in doing things?  0 - not at all  Feeling down, depressed , or hopeless? 0 - not at all  Patient Health Questionnaire Score: 0     If depressive symptoms identified deferred to follow up visit unless specifically addressed in assessment and plan.    Interpretation of PHQ-9 Total Score   Score Severity   1-4 No Depression   5-9 Mild Depression   10-14 Moderate Depression   15-19 Moderately Severe Depression   20-27 Severe Depression    Screening for Cognitive Impairment    Three Minute Recall  (village, kitchen, baby) 1/3    Ck clock face with all 12 numbers and set the hands to show 10 past 10.  Yes 5/5  Cognitive concerns identified deferred for follow up unless specifically addressed in assessment and plan.    Fall Risk Assessment    Has the patient had two or more falls in the last year or any fall with injury in the last year?  No    Safety Assessment    Throw rugs on floor.  No  Handrails on all stairs.  Yes  Good lighting in all hallways.  Yes  Difficulty hearing.  Yes  Patient counseled about all safety risks that were identified.    Functional Assessment ADLs    Are there any barriers preventing you from cooking for yourself or meeting nutritional needs?  No.    Are there any barriers preventing you from driving safely or obtaining transportation?  No.    Are there any barriers preventing you from using a telephone or calling for help?  No.    Are there any barriers preventing you from shopping?  No.    Are there any barriers preventing you from taking care of your own finances?  No.    Are there any barriers preventing you from managing your medications?  No.    Are there any barriers preventing you from showering, bathing or dressing yourself?  No.    Are you currently engaging in any exercise or physical activity?  No.     What is your perception of your health?  Good.      Health Maintenance Summary                IMM INFLUENZA Overdue 9/1/2019      Done 9/12/2018 Imm Admin: Influenza Vaccine Adult HD     Patient has more history with this topic...    IMM ZOSTER VACCINES Postponed 9/18/2031 Originally 8/20/1990. Insurance/Financial    BONE DENSITY Next Due 10/17/2019      Previously completed 10/17/2014      Patient has more history with this topic...    Annual Wellness Visit Next Due 9/9/2020      Done 9/9/2019 Visit Dx: Medicare annual wellness visit, subsequent     Patient has more history with this topic...    COLONOSCOPY Next Due 8/21/2023      Done 8/21/2013 AMB REFERRAL TO GI FOR  COLONOSCOPY    IMM DTaP/Tdap/Td Vaccine Next Due 4/18/2025      Done 4/18/2015 Imm Admin: Tdap Vaccine          Patient Care Team:  Hector Neville M.D. as PCP - General (Internal Medicine)  Tereso Hearn M.D. as Consulting Physician (Cardiology)  Micaela Judd M.D. as Consulting Physician (Orthopaedics)  Bunny Sorto M.D. as Consulting Physician (Neurology)  Elvira Fu [facility] as      Patient Active Problem List    Diagnosis Date Noted   • Acquired hypothyroidism 05/08/2014     Priority: Medium     Class: Chronic   • IGT (impaired glucose tolerance) 05/08/2014     Priority: Medium     Class: Chronic   • Essential hypertension 07/05/2011     Priority: Medium   • Dyslipidemia 07/05/2011     Priority: Medium   • SVT (supraventricular tachycardia) (HCC) 09/09/2019   • Depression 01/02/2019   • Primary osteoarthritis of left knee 04/26/2018   • Mild cognitive disorder 03/27/2018   • Gastroesophageal reflux disease without esophagitis 08/29/2016       Current Outpatient Medications   Medication Sig Dispense Refill   • levothyroxine (SYNTHROID) 88 MCG Tab Take 0.5 Tabs by mouth Every morning on an empty stomach. 45 Tab 3   • furosemide (LASIX) 20 MG Tab Take 1 Tab by mouth every day. 100 Tab 3   • metoprolol SR (TOPROL XL) 25 MG TABLET SR 24 HR TAKE 1 TABLET BY MOUTH ONCE DAILY 90 Tab 3   • memantine (NAMENDA) 10 MG Tab TAKE 1 TABLET BY MOUTH TWICE DAILY 200 Tab 0   • omeprazole (PRILOSEC) 20 MG delayed-release capsule Take 1 Cap by mouth every day. 100 Cap 2   • escitalopram (LEXAPRO) 10 MG Tab TAKE 1 TABLET BY MOUTH ONCE DAILY 90 Tab 1   • simvastatin (ZOCOR) 40 MG Tab TAKE ONE TABLET BY MOUTH ONCE DAILY IN THE EVENING 100 Tab 3   • potassium chloride ER (KLOR-CON) 10 MEQ tablet TAKE ONE TABLET BY MOUTH TWICE DAILY 180 Tab 3   • acetaminophen (TYLENOL) 500 MG Tab Take 1.5 Tabs by mouth every 6 hours. 30 Tab 0   • aspirin EC 81 MG EC tablet Take 1 Tab by mouth 2 times a day.  "60 Tab 0   • Cyanocobalamin (VITAMIN B-12) 1000 MCG Tab Take 1,000 mcg by mouth every day.     • VITAMIN D, CHOLECALCIFEROL, PO Take 2,000 Units by mouth every day.       No current facility-administered medications for this visit.          Allergies as of 09/09/2019 - Reviewed 09/09/2019   Allergen Reaction Noted   • Nsaids  07/05/2011   • Demerol Vomiting 11/14/2011        ROS: Denies Chest pain, SOB, LE edema.    /70 (BP Location: Right arm, Patient Position: Sitting)   Pulse 88   Temp 36.6 °C (97.9 °F)   Ht 1.549 m (5' 1\")   Wt 67.1 kg (148 lb)   SpO2 91%   BMI 27.96 kg/m²     Physical Exam:  Gen:         Alert and oriented, No apparent distress.  Neck:        No Lymphadenopathy or Bruits.  Lungs:     Clear to auscultation bilaterally  CV:          Regular rate and rhythm. No murmurs, rubs or gallops.               Ext:          No clubbing, cyanosis, edema.      Assessment and Plan.   79 y.o. female with the following issues.    1. Medicare annual wellness visit, subsequent  Discussed healthy lifestyle habits as well as screening regimens.  - Subsequent Annual Wellness Visit - Includes PPPS ()    2. IGT (impaired glucose tolerance)  Discussion diet exercise recheck next lab draw  - POCT Hemoglobin A1C    3. Essential hypertension  Blood pressure well controlled refill medications  - furosemide (LASIX) 20 MG Tab; Take 1 Tab by mouth every day.  Dispense: 100 Tab; Refill: 3    4. Dyslipidemia  Rechecking cholesterol continue statin  - Comp Metabolic Panel; Future  - Lipid Profile; Future    5. Acquired hypothyroidism  Recheck thyroid  - TSH; Future  - levothyroxine (SYNTHROID) 88 MCG Tab; Take 0.5 Tabs by mouth Every morning on an empty stomach.  Dispense: 45 Tab; Refill: 3    6. Current moderate episode of major depressive disorder, unspecified whether recurrent (HCC)  Mood doing well no change to therapy  - Subsequent Annual Wellness Visit - Includes PPPS ()    7. Mild cognitive " disorder  She is followed by neurology for unknown memory loss spells.  She does report that they are sometimes related to headache she does have follow-up with new neurologist.  - Subsequent Annual Wellness Visit - Includes PPPS ()    8. Gastroesophageal reflux disease without esophagitis  Denying any advancing symptoms  - Subsequent Annual Wellness Visit - Includes PPPS ()    9. SVT (supraventricular tachycardia) (HCC)  Denying any palpitations or chest pains.  - Subsequent Annual Wellness Visit - Includes PPPS ()

## 2019-09-14 ENCOUNTER — HOSPITAL ENCOUNTER (OUTPATIENT)
Dept: LAB | Facility: MEDICAL CENTER | Age: 79
End: 2019-09-14
Attending: INTERNAL MEDICINE
Payer: MEDICARE

## 2019-09-14 DIAGNOSIS — E03.9 ACQUIRED HYPOTHYROIDISM: ICD-10-CM

## 2019-09-14 DIAGNOSIS — E78.5 DYSLIPIDEMIA: ICD-10-CM

## 2019-09-14 LAB
ALBUMIN SERPL BCP-MCNC: 4.2 G/DL (ref 3.2–4.9)
ALBUMIN/GLOB SERPL: 1.7 G/DL
ALP SERPL-CCNC: 54 U/L (ref 30–99)
ALT SERPL-CCNC: 13 U/L (ref 2–50)
ANION GAP SERPL CALC-SCNC: 12 MMOL/L (ref 0–11.9)
AST SERPL-CCNC: 21 U/L (ref 12–45)
BILIRUB SERPL-MCNC: 0.4 MG/DL (ref 0.1–1.5)
BUN SERPL-MCNC: 22 MG/DL (ref 8–22)
CALCIUM SERPL-MCNC: 9.5 MG/DL (ref 8.5–10.5)
CHLORIDE SERPL-SCNC: 107 MMOL/L (ref 96–112)
CHOLEST SERPL-MCNC: 150 MG/DL (ref 100–199)
CO2 SERPL-SCNC: 24 MMOL/L (ref 20–33)
CREAT SERPL-MCNC: 1.22 MG/DL (ref 0.5–1.4)
FASTING STATUS PATIENT QL REPORTED: NORMAL
GLOBULIN SER CALC-MCNC: 2.5 G/DL (ref 1.9–3.5)
GLUCOSE SERPL-MCNC: 108 MG/DL (ref 65–99)
HDLC SERPL-MCNC: 50 MG/DL
LDLC SERPL CALC-MCNC: 76 MG/DL
POTASSIUM SERPL-SCNC: 4.2 MMOL/L (ref 3.6–5.5)
PROT SERPL-MCNC: 6.7 G/DL (ref 6–8.2)
SODIUM SERPL-SCNC: 143 MMOL/L (ref 135–145)
TRIGL SERPL-MCNC: 120 MG/DL (ref 0–149)
TSH SERPL DL<=0.005 MIU/L-ACNC: 1.81 UIU/ML (ref 0.38–5.33)

## 2019-09-14 PROCEDURE — 80061 LIPID PANEL: CPT

## 2019-09-14 PROCEDURE — 36415 COLL VENOUS BLD VENIPUNCTURE: CPT

## 2019-09-14 PROCEDURE — 84443 ASSAY THYROID STIM HORMONE: CPT

## 2019-09-14 PROCEDURE — 80053 COMPREHEN METABOLIC PANEL: CPT

## 2019-09-23 ENCOUNTER — HOSPITAL ENCOUNTER (OUTPATIENT)
Dept: LAB | Facility: MEDICAL CENTER | Age: 79
End: 2019-09-23
Attending: INTERNAL MEDICINE
Payer: MEDICARE

## 2019-09-23 ENCOUNTER — OFFICE VISIT (OUTPATIENT)
Dept: MEDICAL GROUP | Facility: MEDICAL CENTER | Age: 79
End: 2019-09-23
Payer: MEDICARE

## 2019-09-23 VITALS
HEART RATE: 79 BPM | SYSTOLIC BLOOD PRESSURE: 121 MMHG | TEMPERATURE: 97 F | WEIGHT: 151 LBS | BODY MASS INDEX: 28.51 KG/M2 | DIASTOLIC BLOOD PRESSURE: 70 MMHG | OXYGEN SATURATION: 93 % | HEIGHT: 61 IN

## 2019-09-23 DIAGNOSIS — R51.9 TEMPORAL PAIN: ICD-10-CM

## 2019-09-23 LAB — ERYTHROCYTE [SEDIMENTATION RATE] IN BLOOD BY WESTERGREN METHOD: 9 MM/HOUR (ref 0–30)

## 2019-09-23 PROCEDURE — 85652 RBC SED RATE AUTOMATED: CPT

## 2019-09-23 PROCEDURE — 99213 OFFICE O/P EST LOW 20 MIN: CPT | Performed by: INTERNAL MEDICINE

## 2019-09-23 PROCEDURE — 36415 COLL VENOUS BLD VENIPUNCTURE: CPT

## 2019-09-23 RX ORDER — INFLUENZA A VIRUS A/MICHIGAN/45/2015 X-275 (H1N1) ANTIGEN (FORMALDEHYDE INACTIVATED), INFLUENZA A VIRUS A/SINGAPORE/INFIMH-16-0019/2016 IVR-186 (H3N2) ANTIGEN (FORMALDEHYDE INACTIVATED), AND INFLUENZA B VIRUS B/MARYLAND/15/2016 BX-69A (A B/COLORADO/6/2017-LIKE VIRUS) ANTIGEN (FORMALDEHYDE INACTIVATED) 60; 60; 60 UG/.5ML; UG/.5ML; UG/.5ML
INJECTION, SUSPENSION INTRAMUSCULAR
Refills: 0 | COMMUNITY
Start: 2019-09-17 | End: 2019-09-23

## 2019-09-23 NOTE — PROGRESS NOTES
CC: Head pain.                                                                                                                                      HPI:   Jill presents today with the following.    1. Temporal pain  Presents complaining of pain over her left temple for the last several weeks.  She does report it transmits across the face she feels crawling on her face slightly.  She is concerned because she has had shingles in the past.  Denies any rashes.  She keeps pointing just above her ear to her left temple is the main source of pain.  Describes a 4 out of 10 intensity intermittently.  Currently not having the pain.  No fevers or chills no blurred vision no muscle aches.      Patient Active Problem List    Diagnosis Date Noted   • Acquired hypothyroidism 05/08/2014     Priority: Medium     Class: Chronic   • IGT (impaired glucose tolerance) 05/08/2014     Priority: Medium     Class: Chronic   • Essential hypertension 07/05/2011     Priority: Medium   • Dyslipidemia 07/05/2011     Priority: Medium   • SVT (supraventricular tachycardia) (Formerly [facility]) 09/09/2019   • Depression 01/02/2019   • Primary osteoarthritis of left knee 04/26/2018   • Mild cognitive disorder 03/27/2018   • Gastroesophageal reflux disease without esophagitis 08/29/2016       Current Outpatient Medications   Medication Sig Dispense Refill   • levothyroxine (SYNTHROID) 88 MCG Tab Take 0.5 Tabs by mouth Every morning on an empty stomach. 45 Tab 3   • furosemide (LASIX) 20 MG Tab Take 1 Tab by mouth every day. 100 Tab 3   • metoprolol SR (TOPROL XL) 25 MG TABLET SR 24 HR TAKE 1 TABLET BY MOUTH ONCE DAILY 90 Tab 3   • memantine (NAMENDA) 10 MG Tab TAKE 1 TABLET BY MOUTH TWICE DAILY 200 Tab 0   • omeprazole (PRILOSEC) 20 MG delayed-release capsule Take 1 Cap by mouth every day. 100 Cap 2   • escitalopram (LEXAPRO) 10 MG Tab TAKE 1 TABLET BY MOUTH ONCE DAILY 90 Tab 1   • simvastatin (ZOCOR) 40 MG Tab TAKE ONE TABLET BY MOUTH ONCE DAILY IN THE  "EVENING 100 Tab 3   • potassium chloride ER (KLOR-CON) 10 MEQ tablet TAKE ONE TABLET BY MOUTH TWICE DAILY 180 Tab 3   • acetaminophen (TYLENOL) 500 MG Tab Take 1.5 Tabs by mouth every 6 hours. 30 Tab 0   • aspirin EC 81 MG EC tablet Take 1 Tab by mouth 2 times a day. 60 Tab 0   • Cyanocobalamin (VITAMIN B-12) 1000 MCG Tab Take 1,000 mcg by mouth every day.     • VITAMIN D, CHOLECALCIFEROL, PO Take 2,000 Units by mouth every day.       No current facility-administered medications for this visit.          Allergies as of 09/23/2019 - Reviewed 09/23/2019   Allergen Reaction Noted   • Nsaids  07/05/2011   • Demerol Vomiting 11/14/2011        ROS: Denies Chest pain, SOB, LE edema.    /70 (BP Location: Left arm, Patient Position: Sitting)   Pulse 79   Temp 36.1 °C (97 °F)   Ht 1.549 m (5' 1\")   Wt 68.5 kg (151 lb)   SpO2 93%   BMI 28.53 kg/m²     Physical Exam:  Gen:         Alert and oriented, No apparent distress.  Neck:        No Lymphadenopathy or Bruits.  Lungs:     Clear to auscultation bilaterally  CV:          Regular rate and rhythm. No murmurs, rubs or gallops.               Ext:          No clubbing, cyanosis, edema.      Assessment and Plan.   79 y.o. female with the following issues.    1. Temporal pain  Recent blood work does not lend itself to any diagnosis have sent for sedimentation rate.  If normal suspect possible trigeminal neuralgia.  She will discuss with her neurologist if still present.  If things are not getting better she will contact office.  - LEROY SED RATE; Future      "

## 2019-10-09 ENCOUNTER — PATIENT OUTREACH (OUTPATIENT)
Dept: HEALTH INFORMATION MANAGEMENT | Facility: OTHER | Age: 79
End: 2019-10-09

## 2019-10-09 NOTE — PROGRESS NOTES
Outcome: Pt called to ask about her vision benefits as she needs to schedule and appointment with Dr. Plaza for follow up after surgery last year. I advised pt that according to the Summary of Benefits:Vision care: $25 for each yearly routine eye exam and $20 for each Medicare-covered eye exam (diagnosis and treatment for disease and conditions of the eye). Pt said she has not had an eye exam this year. Pt  asked for assistance scheduling the appointment on her behalf. She will be out of town until Nov. 5th and she already has an appointment on Nov. 13th. I told pt we would call her back with the appointment details. Pt denies further questions or concerns.     Please transfer to Patient Outreach Team at [phone] when patient returns call.      Attempt # 1

## 2019-11-10 DIAGNOSIS — I10 ESSENTIAL HYPERTENSION: ICD-10-CM

## 2019-11-10 RX ORDER — FUROSEMIDE 20 MG/1
TABLET ORAL
Qty: 90 TAB | Refills: 3 | Status: SHIPPED | OUTPATIENT
Start: 2019-11-10 | End: 2019-12-04 | Stop reason: SDUPTHER

## 2019-11-19 RX ORDER — ESCITALOPRAM OXALATE 10 MG/1
TABLET ORAL
Qty: 100 TAB | Refills: 3 | Status: SHIPPED | OUTPATIENT
Start: 2019-11-19 | End: 2020-09-11 | Stop reason: SDUPTHER

## 2019-11-25 RX ORDER — ESCITALOPRAM OXALATE 10 MG/1
TABLET ORAL
Refills: 1 | OUTPATIENT
Start: 2019-11-25

## 2019-12-04 DIAGNOSIS — I10 ESSENTIAL HYPERTENSION: ICD-10-CM

## 2019-12-04 RX ORDER — FUROSEMIDE 20 MG/1
20 TABLET ORAL DAILY
Qty: 90 TAB | Refills: 3 | Status: SHIPPED | OUTPATIENT
Start: 2019-12-04 | End: 2020-11-09

## 2020-01-20 ENCOUNTER — OFFICE VISIT (OUTPATIENT)
Dept: NEUROLOGY | Facility: MEDICAL CENTER | Age: 80
End: 2020-01-20
Payer: MEDICARE

## 2020-01-20 VITALS
OXYGEN SATURATION: 93 % | TEMPERATURE: 97.8 F | WEIGHT: 149.9 LBS | RESPIRATION RATE: 16 BRPM | HEART RATE: 113 BPM | BODY MASS INDEX: 28.3 KG/M2 | HEIGHT: 61 IN | SYSTOLIC BLOOD PRESSURE: 120 MMHG | DIASTOLIC BLOOD PRESSURE: 70 MMHG

## 2020-01-20 DIAGNOSIS — F09 MILD COGNITIVE DISORDER: ICD-10-CM

## 2020-01-20 DIAGNOSIS — G45.4 TRANSIENT GLOBAL AMNESIA: ICD-10-CM

## 2020-01-20 DIAGNOSIS — R41.3 SPELL OF MEMORY LOSS: ICD-10-CM

## 2020-01-20 PROCEDURE — 99215 OFFICE O/P EST HI 40 MIN: CPT

## 2020-01-20 RX ORDER — MEMANTINE HYDROCHLORIDE 28 MG/1
28 CAPSULE, EXTENDED RELEASE ORAL DAILY
Qty: 90 CAP | Refills: 3 | Status: SHIPPED | OUTPATIENT
Start: 2020-01-20 | End: 2020-09-11 | Stop reason: SDUPTHER

## 2020-01-20 RX ORDER — ZONISAMIDE 100 MG/1
100 CAPSULE ORAL DAILY
Qty: 90 CAP | Refills: 3 | Status: SHIPPED | OUTPATIENT
Start: 2020-01-20 | End: 2020-09-11 | Stop reason: SDUPTHER

## 2020-01-20 NOTE — PROGRESS NOTES
"Cc Memory loss and headaches     HPI  80 yo female presents with memory loss since 2015/2016  and headaches. She is here with her . They used to follow up with Dr Sorto in the past he felt this was mild cognitive impairment and a possible seizure like disorder for which he had prescribed LEV however this did not work for the patient as it did not improve her symptoms and she also had side effects including depression.  Felt as if worms are \" crawling inside of her head\" on the left side of her head.  This had been  More frequently in the past but now getting better.  1 year ago saw DR Sorto but now getting better.  Gradually getting more forgetful pain in head is not problematic anymore.  When they went to their cruise she had two days of being confused she could not read the menu she had seen the words and even when she did they would mean nothing to her.  Could not keep up with conversations.forgets names, words and would need constant reminders regarding things that she needs to do or appointments to keep.  She has good and bad days on good days she feels great and talks well.On bad days she has trouble with speech and memory.   tells me there are episodes of amnesia lasting 3-4 minutes and sometimes 30 minutes occasionally.   She is drving still and her last episdoe September 1 when she had several hours long episode of being \" not there\". There was no shaking or twitches and no bowel or bladder incontinence.   is with her when she drives however she does drive.  She never goes anywhere alone and  goes with her.   pays their bills.  She would have trouble managing computer and they transferred all bills to go paper and she is able to write checks (  is 5 years older and does not hear well)   She still cooks and she has trouble remembering ingredients- takes her longer time to make a meal but she does not leave the stove and and basically is able to manage this task.  She " had EEG with Dr bay and this revealed intermittent left hemispheric slowing with no clinical seizures however no above noted episodes were captured.  Review of Systems   Constitutional: Positive for malaise/fatigue.   HENT: Negative.    Eyes: Negative.    Respiratory: Negative.    Cardiovascular: Negative.    Gastrointestinal: Negative.    Genitourinary: Negative.    Musculoskeletal: Negative.    Skin: Negative.    Neurological: Positive for loss of consciousness and headaches.   Endo/Heme/Allergies: Negative.    Psychiatric/Behavioral: Positive for memory loss.     Past Medical History:   Diagnosis Date   • Arrhythmia     PAT, last episode 11/2015; cardiologist, Dr. Hearn   • Arthritis    • Bowel habit changes     constipation/ diarrhea   • Cancer (HCC) 2004    squamous cell skin   • Chronic pain of left knee 6/8/2016   • Dental disorder     implants   • Heart burn    • Hiatus hernia syndrome    • High cholesterol    • Hyperlipidemia 7/5/2011   • Hypertension 7/5/2011   • Indigestion    • Kidney stones last 2014   • Mild cognitive disorder    • Pneumonia 2007   • Psychiatric problem     depression   • Renal disorder 2009    stones   • Unspecified urinary incontinence     mild   • Urinary bladder disorder      Past Surgical History:   Procedure Laterality Date   • KNEE ARTHROPLASTY TOTAL Left 4/25/2018    Procedure: KNEE ARTHROPLASTY TOTAL;  Surgeon: Micaela Judd M.D.;  Location: SURGERY [facility];  Service: Orthopedics   • COLPOSACROPEXY ROBOTIC  11/14/2011    Performed by [facility] at SURGERY [facility]   • CYSTOSCOPY  11/14/2011    Performed by [facility] RA[facility]ES at SURGERY [facility]   • HYSTERECTOMY, TOTAL ABDOMINAL  1989   • CHOLECYSTECTOMY  1971   • BLADDER SLING FEMALE  approx 2008 & 2010    bladder slings x2   • CYSTOSCOPY  1990's    bladder stones     Family History   Problem Relation Age of Onset   • Lung Disease Father    • Heart Disease Father    • Cancer Mother          transitional caner    • Arrythmia Sister    • Hypertension Brother    • No Known Problems Sister    • No Known Problems Sister    • No Known Problems Sister      Social History     Socioeconomic History   • Marital status:      Spouse name: Not on file   • Number of children: Not on file   • Years of education: Not on file   • Highest education level: Not on file   Occupational History   • Not on file   Social Needs   • Financial resource strain: Not on file   • Food insecurity:     Worry: Not on file     Inability: Not on file   • Transportation needs:     Medical: Not on file     Non-medical: Not on file   Tobacco Use   • Smoking status: Former Smoker     Packs/day: 1.00     Years: 12.00     Pack years: 12.00     Last attempt to quit: 1975     Years since quittin.0   • Smokeless tobacco: Never Used   Substance and Sexual Activity   • Alcohol use: Yes     Alcohol/week: 0.0 oz     Comment: twice a year   • Drug use: No   • Sexual activity: Not Currently   Lifestyle   • Physical activity:     Days per week: Not on file     Minutes per session: Not on file   • Stress: Not on file   Relationships   • Social connections:     Talks on phone: Not on file     Gets together: Not on file     Attends [employer] service: Not on file     Active member of club or organization: Not on file     Attends meetings of clubs or organizations: Not on file     Relationship status: Not on file   • Intimate partner violence:     Fear of current or ex partner: Not on file     Emotionally abused: Not on file     Physically abused: Not on file     Forced sexual activity: Not on file   Other Topics Concern   • Not on file   Social History Narrative   • Not on file     Lab Results   Component Value Date/Time    WBC 7.3 2018 03:55 PM    RBC 3.92 (L) 2018 03:55 PM    HEMOGLOBIN 13.7 2018 03:55 PM    HEMATOCRIT 41.3 2018 03:55 PM    .4 (H) 2018 03:55 PM    MCH 34.9 (H) 2018 03:55 PM    MCHC  "33.2 (L) 06/06/2018 03:55 PM    MPV 9.6 06/06/2018 03:55 PM    NEUTSPOLYS 62.60 06/06/2018 03:55 PM    LYMPHOCYTES 26.00 06/06/2018 03:55 PM    MONOCYTES 8.20 06/06/2018 03:55 PM    EOSINOPHILS 2.50 06/06/2018 03:55 PM    BASOPHILS 0.40 06/06/2018 03:55 PM      Lab Results   Component Value Date/Time    SODIUM 143 09/14/2019 07:46 AM    POTASSIUM 4.2 09/14/2019 07:46 AM    CHLORIDE 107 09/14/2019 07:46 AM    CO2 24 09/14/2019 07:46 AM    GLUCOSE 108 (H) 09/14/2019 07:46 AM    BUN 22 09/14/2019 07:46 AM    CREATININE 1.22 09/14/2019 07:46 AM    CREATININE 1.1 03/25/2008 10:20 AM      Encounter Vitals  Standard Vitals  Vitals  Blood Pressure : 120/70  Temperature: 36.6 °C (97.8 °F)  Temp src: Temporal  Pulse: (!) 113  Respiration: 16  Pulse Oximetry: 93 %  Height: 154.9 cm (5' 1\")  Weight: 68 kg (149 lb 14.4 oz)  Encounter Vitals  Temperature: 36.6 °C (97.8 °F)  Temp src: Temporal  Blood Pressure : 120/70  Pulse: (!) 113  Respiration: 16  Pulse Oximetry: 93 %  Weight: 68 kg (149 lb 14.4 oz)  Height: 154.9 cm (5' 1\")  BMI (Calculated): 28.32  Physical exam   Constitutional: Well-developed, well-nourished, good hygiene. Appears stated age.  Cardiovascular: RRR, with no murmurs, rubs or gallops. No carotid bruits.   Respiratory: Lungs CTA B/L, no W/R/R.   Abdomen: Soft Non-tender to Palpation. Non-distended.  Extremities: No peripheral edema, pedal pulses intact.   Skin: Warm, dry, intact. No rashes observed.  Eyes: Sclera anicteric   Funduscopic: Optic discs flat with no evidence of papilledema or pallor.   Neurologic:   Mental Status: Awake, alert, oriented x 3.   Speech: Fluent with normal prosody.   Memory:see moca     Concentration: Attentive. Able to focus on history and follow multi-step commands.              Fund of Knowledge: Appropriate   Cranial Nerves:    CN II: PERRL. No afferent pupillary defect.    CN III, IV, VI: Good eye closure, EOMI.     CN V: Facial sensation intact and symmetric.     CN VII: No " facial asymmetry.     CN VIII: Hearing intact.     CN IX and X: Palate elevates symmetrically. Normal gag reflex.    CN XI: Symmetric shoulder shrug.     CN XII: Tongue midline.    Sensory: Intact light touch, vibration and temperature.    Coordination: No evidence of past-pointing on finger to nose testing, no dysdiadochokinesia. Heel to shin intact.             DTR's: 2+ throughout without clonus.    Babinski: Toes downgoing bilaterally.   Romberg: Negative.   Movements: No tremors observed.   Musculoskeletal:    Strength: 5/5 in upper and lower extremities bilaterally.   Gait: Steady, narrow based.    Tone: Normal bulk and tone.   Joints: No swelling.   MOCA 25/50   Lost points for delayed recall 3/5  Naming   Orientation     Impression and plan   Cognitive dysfunction   TGA?   MOCA 25/50 with clear temporal compromise      FH grandmother had AD in her 80s  Aunt had some form of dementia around the same age as her grandmother  Mother  at 84 yo due to cancer  Spells of decreased attentiveness and amnesia - TGA vs seizure like episodes  No driving   Imaging   MRI brain 2017 personally reviewed and NAF   IMPRESSION:     1. MRI OF THE BRAIN WITHOUT CONTRAST WITHIN NORMAL LIMITS FOR AGE WITH MILD ATROPHY AND MILD WHITE MATTER CHANGES.  2. NO EVIDENCE OF MASS LESION, HETEROTOPIC GRAY MATTER, GROSS CORTICAL DYSPLASIA, OR MESIAL TEMPORAL SCLEROSIS.    plan  Will start zonisamide 100 mg daily to see if this helps - discussed SE  No driving  EEG prolonged recording   Neuropsychological testing to further delineate extent of cognitive decline   Increase Namenda to 28 mg ER daily   safety discussed    RTC after above     If routine EEG unremarkable she will need admission for LTM

## 2020-01-23 ENCOUNTER — NON-PROVIDER VISIT (OUTPATIENT)
Dept: NEUROLOGY | Facility: MEDICAL CENTER | Age: 80
End: 2020-01-23
Payer: MEDICARE

## 2020-01-23 DIAGNOSIS — R41.3 SPELL OF MEMORY LOSS: ICD-10-CM

## 2020-01-23 DIAGNOSIS — F09 MILD COGNITIVE DISORDER: ICD-10-CM

## 2020-01-23 ASSESSMENT — ENCOUNTER SYMPTOMS
MUSCULOSKELETAL NEGATIVE: 1
HEADACHES: 1
RESPIRATORY NEGATIVE: 1
GASTROINTESTINAL NEGATIVE: 1
EYES NEGATIVE: 1
CARDIOVASCULAR NEGATIVE: 1
MEMORY LOSS: 1
LOSS OF CONSCIOUSNESS: 1

## 2020-01-23 NOTE — PROCEDURES
Referring provider: Dr. Hernandez     DOS: 0:26:14    INDICATION:  80 yo female with memory loss and spells of altered consciousness.    CURRENT ANTIEPILEPTIC REGIMEN: Zonisamide    TECHNIQUE: 30 channel video electroencephalogram (EEG) was performed in accordance with the international 10-20 system. The study was reviewed in bipolar and referential montages. The recording examined the patient during wakeful and drowsy/sleep state(s).     DESCRIPTION OF THE RECORD:  During the wakefulness, the background showed a symmetrical 8 Hz alpha activity posteriorly with amplitude of 70 mV.  There was reactivity to eye closure/opening.  A normal anterior-posterior gradient was noted with faster beta frequencies seen anteriorly.  During drowsiness, theta/delta frequencies were seen.    During the sleep state, background shows diffuse high-amplitude 4-5 Hz delta activity.  Symmetrical high-amplitude sleep spindles and vertex sharps were seen in the leads over the central regions.     ACTIVATION PROCEDURES:   Hyperventilation was performed by the patient for a total of 3 minutes. The technician performing the test noted good effort. This technique produced electroencephalographic changes in keeping with the expected bilaterally synchronous, frontally predominant, high amplitude slow waves build up.     Intermittent Photic stimulation was performed in a stepwise fashion from 1 to 30 Hz and elicited a normal response (photic driving), most noticeable in the posterior leads.      ICTAL AND/OR INTERICTAL FINDINGS:   No focal or generalized epileptiform activity noted. No regional slowing was seen during this routine study.  No clinical events or seizures were reported or recorded during the study.     EKG: sampling of the EKG recording demonstrated sinus rhythm.     EVENTS:      INTERPRETATION:  This is a  normal video EEG recording in the awake, drowsy and asleep states.  Clinical correlation is recommended.    Note: A normal EEG  does not rule out epilepsy.  If the clinical suspicion remains high for seizures, a prolonged recording to capture clinical or subclinical events may be helpful.    Selena Verdin MD PhD  Board Certified Neurologist   Behavioral Neurologist   Clinical  of Neurology [facility].

## 2020-01-27 PROCEDURE — 95819 EEG AWAKE AND ASLEEP: CPT

## 2020-01-29 ENCOUNTER — TELEPHONE (OUTPATIENT)
Dept: NEUROLOGY | Facility: MEDICAL CENTER | Age: 80
End: 2020-01-29

## 2020-01-30 DIAGNOSIS — F09 MILD COGNITIVE DISORDER: ICD-10-CM

## 2020-01-30 NOTE — TELEPHONE ENCOUNTER
Patient having a MRI with contrast needs order for creatine  and bun    Please advise    Thank you

## 2020-01-31 ENCOUNTER — HOSPITAL ENCOUNTER (OUTPATIENT)
Dept: LAB | Facility: MEDICAL CENTER | Age: 80
End: 2020-01-31
Payer: MEDICARE

## 2020-01-31 ENCOUNTER — HOSPITAL ENCOUNTER (OUTPATIENT)
Dept: LAB | Facility: MEDICAL CENTER | Age: 80
End: 2020-01-31
Attending: PSYCHIATRY & NEUROLOGY
Payer: MEDICARE

## 2020-01-31 DIAGNOSIS — F09 MILD COGNITIVE DISORDER: ICD-10-CM

## 2020-01-31 LAB
ANION GAP SERPL CALC-SCNC: 12 MMOL/L (ref 0–11.9)
BUN SERPL-MCNC: 27 MG/DL (ref 8–22)
CALCIUM SERPL-MCNC: 10 MG/DL (ref 8.5–10.5)
CHLORIDE SERPL-SCNC: 106 MMOL/L (ref 96–112)
CO2 SERPL-SCNC: 23 MMOL/L (ref 20–33)
CREAT SERPL-MCNC: 1.4 MG/DL (ref 0.5–1.4)
CREAT SERPL-MCNC: 1.42 MG/DL (ref 0.5–1.4)
GLUCOSE SERPL-MCNC: 118 MG/DL (ref 65–99)
POTASSIUM SERPL-SCNC: 3.8 MMOL/L (ref 3.6–5.5)
SODIUM SERPL-SCNC: 141 MMOL/L (ref 135–145)

## 2020-01-31 PROCEDURE — 80048 BASIC METABOLIC PNL TOTAL CA: CPT

## 2020-01-31 PROCEDURE — 82565 ASSAY OF CREATININE: CPT

## 2020-01-31 PROCEDURE — 36415 COLL VENOUS BLD VENIPUNCTURE: CPT

## 2020-02-04 ENCOUNTER — HOSPITAL ENCOUNTER (OUTPATIENT)
Dept: RADIOLOGY | Facility: MEDICAL CENTER | Age: 80
End: 2020-02-04
Payer: MEDICARE

## 2020-02-04 DIAGNOSIS — F09 MILD COGNITIVE DISORDER: ICD-10-CM

## 2020-02-04 DIAGNOSIS — R41.3 SPELL OF MEMORY LOSS: ICD-10-CM

## 2020-02-04 PROCEDURE — A9576 INJ PROHANCE MULTIPACK: HCPCS

## 2020-02-04 PROCEDURE — 700117 HCHG RX CONTRAST REV CODE 255

## 2020-02-04 PROCEDURE — 70553 MRI BRAIN STEM W/O & W/DYE: CPT

## 2020-02-04 RX ADMIN — GADOTERIDOL 15 ML: 279.3 INJECTION, SOLUTION INTRAVENOUS at 08:50

## 2020-02-05 RX ORDER — POTASSIUM CHLORIDE 750 MG/1
TABLET, FILM COATED, EXTENDED RELEASE ORAL
Qty: 180 TAB | Refills: 0 | Status: SHIPPED | OUTPATIENT
Start: 2020-02-05 | End: 2020-05-02

## 2020-02-05 NOTE — RESULT ENCOUNTER NOTE
She will have to repeat CMP in 1-2 weeks and follow with PCP   She already had MRI brain with contrast

## 2020-02-10 ENCOUNTER — TELEPHONE (OUTPATIENT)
Dept: NEUROLOGY | Facility: MEDICAL CENTER | Age: 80
End: 2020-02-10

## 2020-02-10 NOTE — TELEPHONE ENCOUNTER
Patient requesting MRI,EEG results, and does she need follow up visit?    Please advise    Thank you

## 2020-02-14 ENCOUNTER — OFFICE VISIT (OUTPATIENT)
Dept: NEUROLOGY | Facility: MEDICAL CENTER | Age: 80
End: 2020-02-14
Payer: MEDICARE

## 2020-02-14 VITALS
BODY MASS INDEX: 29.07 KG/M2 | DIASTOLIC BLOOD PRESSURE: 80 MMHG | HEIGHT: 61 IN | WEIGHT: 154 LBS | SYSTOLIC BLOOD PRESSURE: 114 MMHG | RESPIRATION RATE: 16 BRPM | HEART RATE: 85 BPM | OXYGEN SATURATION: 94 % | TEMPERATURE: 97.6 F

## 2020-02-14 DIAGNOSIS — R41.3 SPELL OF MEMORY LOSS: ICD-10-CM

## 2020-02-14 DIAGNOSIS — G45.4 TRANSIENT GLOBAL AMNESIA: ICD-10-CM

## 2020-02-14 DIAGNOSIS — F32.1 CURRENT MODERATE EPISODE OF MAJOR DEPRESSIVE DISORDER, UNSPECIFIED WHETHER RECURRENT (HCC): ICD-10-CM

## 2020-02-14 DIAGNOSIS — F09 MILD COGNITIVE DISORDER: ICD-10-CM

## 2020-02-14 PROCEDURE — 99213 OFFICE O/P EST LOW 20 MIN: CPT

## 2020-02-14 NOTE — PROGRESS NOTES
"CC: follow up after EEG and MRI brain         HPI:  80 yo female with mild cognitive impairment, amnestic episodes suggestive of TGA and abnormal EEG suggestive of left intermittent slowing presents for follow up.  She is doing much better since we had started [employer].  She is much better and has no further episodes of \" lost time\".  Her  is with her.  She does have to walk cautiously.    ROS:   Constitutional: No fevers or chills.  Eyes: No blurry vision or eye pain.  ENT: No dysphagia or hearing loss.  Respiratory: No cough or shortness of breath.  Cardiovascular: No chest pain or palpitations.  GI: No nausea, vomiting, or diarrhea.  : No urinary incontinence or dysuria.  Musculoskeletal: No joint swelling or arthralgias.  Skin: No skin rashes.  Neuro: as above   Endocrine: No heat or cold intolerance. No polydipsia or polyuria.  Psych: No depression or anxiety.  Heme/Lymph: No easy bruising or swollen lymph nodes.      Past Medical History:  Past Medical History:   Diagnosis Date   • Arrhythmia     PAT, last episode 11/2015; cardiologist, Dr. Hearn   • Arthritis    • Bowel habit changes     constipation/ diarrhea   • Cancer (HCC) 2004    squamous cell skin   • Chronic pain of left knee 6/8/2016   • Dental disorder     implants   • Heart burn    • Hiatus hernia syndrome    • High cholesterol    • Hyperlipidemia 7/5/2011   • Hypertension 7/5/2011   • Indigestion    • Kidney stones last 2014   • Mild cognitive disorder    • Pneumonia 2007   • Psychiatric problem     depression   • Renal disorder 2009    stones   • Unspecified urinary incontinence     mild   • Urinary bladder disorder        Past Surgical History:   Past Surgical History:   Procedure Laterality Date   • KNEE ARTHROPLASTY TOTAL Left 4/25/2018    Procedure: KNEE ARTHROPLASTY TOTAL;  Surgeon: Micaela Judd M.D.;  Location: SURGERY [facility] [place];  Service: Orthopedics   • COLPOSACROPEXY ROBOTIC  11/14/2011    Performed by [facility]" E at SURGERY [facility] ORS   • CYSTOSCOPY  2011    Performed by [facility] at SURGERY [facility] ORS   • HYSTERECTOMY, TOTAL ABDOMINAL     • CHOLECYSTECTOMY     • BLADDER SLING FEMALE  approx  &     bladder slings x2   • CYSTOSCOPY      bladder stones       Social History:   Social History     Socioeconomic History   • Marital status:      Spouse name: Not on file   • Number of children: Not on file   • Years of education: Not on file   • Highest education level: Not on file   Occupational History   • Not on file   Social Needs   • Financial resource strain: Not on file   • Food insecurity     Worry: Not on file     Inability: Not on file   • Transportation needs     Medical: Not on file     Non-medical: Not on file   Tobacco Use   • Smoking status: Former Smoker     Packs/day: 1.00     Years: 12.00     Pack years: 12.00     Last attempt to quit: 1975     Years since quittin.1   • Smokeless tobacco: Never Used   Substance and Sexual Activity   • Alcohol use: Yes     Alcohol/week: 0.0 oz     Comment: twice a year   • Drug use: No   • Sexual activity: Not Currently   Lifestyle   • Physical activity     Days per week: Not on file     Minutes per session: Not on file   • Stress: Not on file   Relationships   • Social connections     Talks on phone: Not on file     Gets together: Not on file     Attends [employer] service: Not on file     Active member of club or organization: Not on file     Attends meetings of clubs or organizations: Not on file     Relationship status: Not on file   • Intimate partner violence     Fear of current or ex partner: Not on file     Emotionally abused: Not on file     Physically abused: Not on file     Forced sexual activity: Not on file   Other Topics Concern   • Not on file   Social History Narrative   • Not on file       Family History:   Family History   Problem Relation Age of Onset   • Lung Disease Father    • Heart Disease Father  "   • Cancer Mother         transitional caner    • Arrythmia Sister    • Hypertension Brother    • No Known Problems Sister    • No Known Problems Sister    • No Known Problems Sister        Allergies:   Allergies   Allergen Reactions   • Nsaids      Ankle swelling and joint pain   • Demerol Vomiting       Physical Exam:     Encounter Vitals  Standard Vitals  Vitals  Blood Pressure : 114/80  Temperature: 36.4 °C (97.6 °F)  Temp src: Temporal  Pulse: 85  Respiration: 16  Pulse Oximetry: 94 %  Height: 154.9 cm (5' 1\")  Weight: 69.9 kg (154 lb)  Encounter Vitals  Temperature: 36.4 °C (97.6 °F)  Temp src: Temporal  Blood Pressure : 114/80  Pulse: 85  Respiration: 16  Pulse Oximetry: 94 %  Weight: 69.9 kg (154 lb)  Height: 154.9 cm (5' 1\")  BMI (Calculated): 29.1  Pulmonary-Specific Vitals     Durable Medical Equipment-Specific Vitals       MOCA 25/30       Constitutional: Well-developed, well-nourished, good hygiene. Appears stated age.  Cardiovascular: RRR, with no murmurs, rubs or gallops. No carotid bruits.   Respiratory: Lungs CTA B/L, no W/R/R.   Abdomen: Soft Non-tender to Palpation. Non-distended.  Extremities: No peripheral edema, pedal pulses intact.   Skin: Warm, dry, intact. No rashes observed.  Eyes: Sclera anicteric   Funduscopic: Optic discs flat with no evidence of papilledema or pallor.   Neurologic:   Mental Status: Awake, alert, oriented x 3.   Speech: Fluent with normal prosody.   Memory: Able to recall recent and remote events accurately.    Concentration: Attentive. Able to focus on history and follow multi-step commands.              Fund of Knowledge: Appropriate   Cranial Nerves:    CN II: PERRL. No afferent pupillary defect.    CN III, IV, VI: Good eye closure, EOMI.     CN V: Facial sensation intact and symmetric.     CN VII: No facial asymmetry.     CN VIII: Hearing intact.     CN IX and X: Palate elevates symmetrically. Normal gag reflex.    CN XI: Symmetric shoulder shrug.     CN XII: Tongue " midline.    Sensory: Intact light touch, vibration and temperature.    Coordination: No evidence of past-pointing on finger to nose testing, no dysdiadochokinesia. Heel to shin intact.             DTR's: 2+ throughout without clonus.    Babinski: Toes downgoing bilaterally.   Romberg: Negative.   Movements: No tremors observed.   Musculoskeletal:    Strength: 5/5 in upper and lower extremities bilaterally.   Gait: Steady, narrow based.    Tone: Normal bulk and tone.   Joints: No swelling.     Labs:  Lab Results   Component Value Date/Time    SODIUM 141 01/31/2020 08:54 AM    POTASSIUM 3.8 01/31/2020 08:54 AM    CHLORIDE 106 01/31/2020 08:54 AM    CO2 23 01/31/2020 08:54 AM    GLUCOSE 118 (H) 01/31/2020 08:54 AM    BUN 27 (H) 01/31/2020 08:54 AM    CREATININE 1.40 01/31/2020 08:54 AM    CREATININE 1.1 03/25/2008 10:20 AM      Lab Results   Component Value Date/Time    WBC 7.3 06/06/2018 03:55 PM    RBC 3.92 (L) 06/06/2018 03:55 PM    HEMOGLOBIN 13.7 06/06/2018 03:55 PM    HEMATOCRIT 41.3 06/06/2018 03:55 PM    .4 (H) 06/06/2018 03:55 PM    MCH 34.9 (H) 06/06/2018 03:55 PM    MCHC 33.2 (L) 06/06/2018 03:55 PM    MPV 9.6 06/06/2018 03:55 PM    NEUTSPOLYS 62.60 06/06/2018 03:55 PM    LYMPHOCYTES 26.00 06/06/2018 03:55 PM    MONOCYTES 8.20 06/06/2018 03:55 PM    EOSINOPHILS 2.50 06/06/2018 03:55 PM    BASOPHILS 0.40 06/06/2018 03:55 PM      Current Outpatient Medications on File Prior to Visit   Medication Sig Dispense Refill   • potassium chloride ER (KLOR-CON) 10 MEQ tablet TAKE 1 TABLET BY MOUTH TWICE DAILY 180 Tab 0   • Memantine HCl ER (NAMENDA) 28 MG CAPSULE SR 24 HR Take 1 Cap by mouth every day. 90 Cap 3   • zonisamide (ZONEGRAN) 100 MG Cap Take 1 Cap by mouth every day. 90 Cap 3   • escitalopram (LEXAPRO) 10 MG Tab TAKE 1 TABLET BY MOUTH ONCE DAILY 100 Tab 3   • levothyroxine (SYNTHROID) 88 MCG Tab Take 0.5 Tabs by mouth Every morning on an empty stomach. 45 Tab 3   • furosemide (LASIX) 20 MG Tab Take 1  Tab by mouth every day. (Patient taking differently: Take 40 mg by mouth every day.) 100 Tab 3   • metoprolol SR (TOPROL XL) 25 MG TABLET SR 24 HR TAKE 1 TABLET BY MOUTH ONCE DAILY 90 Tab 3   • omeprazole (PRILOSEC) 20 MG delayed-release capsule Take 1 Cap by mouth every day. 100 Cap 2   • simvastatin (ZOCOR) 40 MG Tab TAKE ONE TABLET BY MOUTH ONCE DAILY IN THE EVENING 100 Tab 3   • acetaminophen (TYLENOL) 500 MG Tab Take 1.5 Tabs by mouth every 6 hours. 30 Tab 0   • aspirin EC 81 MG EC tablet Take 1 Tab by mouth 2 times a day. 60 Tab 0   • Cyanocobalamin (VITAMIN B-12) 1000 MCG Tab Take 1,000 mcg by mouth every day.     • VITAMIN D, CHOLECALCIFEROL, PO Take 2,000 Units by mouth every day.     • furosemide (LASIX) 20 MG Tab Take 1 Tab by mouth every day. 90 Tab 3     No current facility-administered medications on file prior to visit.      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  MRI brain and EEG within normal limits.  Neuropsychological testing pending for September 2020.  Labs reviewed and within normal limits.    Plan  [facility]  Continue namenda 28 mg ER daily   Neuropsychological testing   No driving at this time     Amnestic episodes, confusional spells   C/w Zonisamide 100 mg daily   EEG reviewed and no regional or generalized slowing   No increased risk for seizures  Safety discussed       1. Proper Diet: We recommend the Mediterranean diet   2. Proper Vascular Health: Making sure that your primary care provider (PCP) is screening for and treating all vascular risk factors (diabetes, high cholesterol, high blood pressure, and such)  3. Quit smoking, if you smoke   4. Exercise as tolerated with a goal of at least 30 minutes 5 days a week or a total of 150 minutes per week  5. Focus  on what you enjoy  6. Try learning new hobbies or skills, even if you’re not great at them  7. Regular social interaction: Maintain an active social life as much as possible   8. Keep your brain active with cognitively stimulating activities such as brain games  9. Get 7-8 hours of sleep per night  10. Maintain a predictable daily routine  [You may visit [email or web address] for more information]       EDUCATION AND COUNSELING:  -Education was provided to the patient and/or family regarding diagnosis and prognosis. The chronic and unpredictable nature of the condition were discussed. There is increased risk for additional events, which may carry potential for significant injuries and death. Discussed frequent seizure triggers: sleep deprivation, medication non-compliance, use of illegal drugs/alcohol, stress, and others.   -We reviewed in detail the current antiepileptic regimen. Potential side effects of antiepileptics were discussed at length, including but no limited to: hypersensitivity reactions (rash and others, some of which can be fatal), visual field changes (some of which may be irreversible), glaucoma, diplopia, kidney stones, osteopenia/osteoporosis/bone fractures, hyperthermia/anhydrosis, hyponatremia, tremors/abnormal movements, ataxia, dizziness, fatigue, increased risk for falls, risk for cardiac arrhythmias/syncope, gastrointestinal side effects(hepatitis, pancreatitis, gastritis, ulcers), gingival hypertrophy/bleeding, drowsiness, sedation, anxiety/nervousness, increased risk for suicide, increased risk for depression, and psychosis.   -We also reviewed drug-drug interactions and their potential effect on seizure control and medication side effects.    -We also discussed in detail potential effects of seizures, epilepsy, and medications during pregnancy, including but not limited to fetal malformations, child developmental/intellectual disability, fetal/ risk for hemorrhages, stillbirth,  maternal death, premature birth, and others. The patient/family aware that pregnancy should be avoided, unless desired, in which case we recommend discussing with us at least a year prior to planned conception. To avoid undesired pregnancy while on antiepileptics, we recommend dual contraception.   -Folic acid 2 mg is recommended for all females in childbearing age (12-44 years of age).   -Recommend chronic vitamin D supplementation and regular exercise (if not contraindicated).   -Patient/family educated on risk for SUDEP (Sudden Death in Epilepsy). Counseling was provided on the importance of strict medication and follow up compliance. The patient/family understand the risks associated with non-adherence with the medical plan as outlined, including but not limited to an increased risk for breakthrough seizures, which may contribute to injuries, disability, status epilepticus, and even death.   -Counseling was also provided on potential effects of alcohol and other drugs, which may lower seizure threshold and/or affect the metabolism of antiepileptic drugs. We recommend avoidance of alcohol and illegal drugs.  -Avoid sleep deprivation.   -We extensively discussed the aspects related to safety in drivers who suffer from epilepsy. The patient is encourage to report to the Division of Motor Vehicles of any condition and/or spells related to confusion, disorientation, and/or loss of awareness and/or loss of consciousness; as these may pose a safety issue if they occur while operating a motor vehicle. The patient and/or family are ultimately responsible for exercising caution and abiding to regulations in place.   -Other seizure precautions were discussed at length, including no diving, no skydiving, no climbing or exposure to unprotected heights, no unsupervised swimming, no Jacuzzi or bathing in bathtubs or deep bodies of water. The patient/family have been advised about risks for operating any machinery while  suffering from seizures / syncope / epilepsy and/or while taking antiepileptic drugs.   -The patient understands and agrees that due to the complexity of his/her diagnosis, results of any testing and further recommendations will typically be discussed/made during a face to face encounter in my office. The patient and/or family further understands it is their responsibility to keep proper follow up.     Patient/family agree with plan, as outlined.

## 2020-02-24 ENCOUNTER — PATIENT OUTREACH (OUTPATIENT)
Dept: HEALTH INFORMATION MANAGEMENT | Facility: OTHER | Age: 80
End: 2020-02-24

## 2020-02-24 DIAGNOSIS — E78.5 DYSLIPIDEMIA: ICD-10-CM

## 2020-02-24 DIAGNOSIS — E03.9 ACQUIRED HYPOTHYROIDISM: ICD-10-CM

## 2020-02-24 DIAGNOSIS — R73.02 IGT (IMPAIRED GLUCOSE TOLERANCE): ICD-10-CM

## 2020-05-02 RX ORDER — POTASSIUM CHLORIDE 750 MG/1
TABLET, FILM COATED, EXTENDED RELEASE ORAL
Qty: 180 TAB | Refills: 0 | Status: SHIPPED | OUTPATIENT
Start: 2020-05-02 | End: 2020-07-31

## 2020-05-12 DIAGNOSIS — E78.5 DYSLIPIDEMIA: ICD-10-CM

## 2020-05-12 RX ORDER — SIMVASTATIN 40 MG
TABLET ORAL
Qty: 100 TAB | Refills: 0 | Status: SHIPPED | OUTPATIENT
Start: 2020-05-12 | End: 2020-08-10

## 2020-06-08 ENCOUNTER — APPOINTMENT (OUTPATIENT)
Dept: NEUROLOGY | Facility: MEDICAL CENTER | Age: 80
End: 2020-06-08
Payer: MEDICARE

## 2020-07-31 RX ORDER — POTASSIUM CHLORIDE 750 MG/1
TABLET, FILM COATED, EXTENDED RELEASE ORAL
Qty: 180 TAB | Refills: 0 | Status: SHIPPED | OUTPATIENT
Start: 2020-07-31 | End: 2020-09-11 | Stop reason: SDUPTHER

## 2020-08-08 DIAGNOSIS — K21.9 GASTROESOPHAGEAL REFLUX DISEASE WITHOUT ESOPHAGITIS: ICD-10-CM

## 2020-08-08 DIAGNOSIS — E78.5 DYSLIPIDEMIA: ICD-10-CM

## 2020-08-10 RX ORDER — OMEPRAZOLE 20 MG/1
CAPSULE, DELAYED RELEASE ORAL
Qty: 30 CAP | Refills: 0 | Status: SHIPPED | OUTPATIENT
Start: 2020-08-10 | End: 2020-09-08

## 2020-08-10 RX ORDER — SIMVASTATIN 40 MG
TABLET ORAL
Qty: 100 TAB | Refills: 0 | Status: SHIPPED | OUTPATIENT
Start: 2020-08-10 | End: 2020-09-11 | Stop reason: SDUPTHER

## 2020-08-20 NOTE — PROGRESS NOTES
Outcome:Birthday call completed nothing else needed at this time     Please transfer to Patient Outreach Team at [phone] when patient returns call.    Attempt # 1

## 2020-08-24 DIAGNOSIS — R00.0 TACHYCARDIA: ICD-10-CM

## 2020-08-24 RX ORDER — METOPROLOL SUCCINATE 25 MG/1
TABLET, EXTENDED RELEASE ORAL
Qty: 100 TAB | Refills: 1 | Status: SHIPPED | OUTPATIENT
Start: 2020-08-24 | End: 2020-09-11 | Stop reason: SDUPTHER

## 2020-09-08 ENCOUNTER — HOSPITAL ENCOUNTER (OUTPATIENT)
Dept: LAB | Facility: MEDICAL CENTER | Age: 80
End: 2020-09-08
Attending: INTERNAL MEDICINE
Payer: MEDICARE

## 2020-09-08 DIAGNOSIS — K21.9 GASTROESOPHAGEAL REFLUX DISEASE WITHOUT ESOPHAGITIS: ICD-10-CM

## 2020-09-08 DIAGNOSIS — R73.02 IGT (IMPAIRED GLUCOSE TOLERANCE): ICD-10-CM

## 2020-09-08 DIAGNOSIS — E03.9 ACQUIRED HYPOTHYROIDISM: ICD-10-CM

## 2020-09-08 DIAGNOSIS — E78.5 DYSLIPIDEMIA: ICD-10-CM

## 2020-09-08 LAB
ALBUMIN SERPL BCP-MCNC: 4.4 G/DL (ref 3.2–4.9)
ALBUMIN/GLOB SERPL: 1.8 G/DL
ALP SERPL-CCNC: 62 U/L (ref 30–99)
ALT SERPL-CCNC: 15 U/L (ref 2–50)
ANION GAP SERPL CALC-SCNC: 12 MMOL/L (ref 7–16)
AST SERPL-CCNC: 17 U/L (ref 12–45)
BILIRUB SERPL-MCNC: 0.2 MG/DL (ref 0.1–1.5)
BUN SERPL-MCNC: 22 MG/DL (ref 8–22)
CALCIUM SERPL-MCNC: 9.4 MG/DL (ref 8.5–10.5)
CHLORIDE SERPL-SCNC: 108 MMOL/L (ref 96–112)
CHOLEST SERPL-MCNC: 153 MG/DL (ref 100–199)
CO2 SERPL-SCNC: 25 MMOL/L (ref 20–33)
CREAT SERPL-MCNC: 1.2 MG/DL (ref 0.5–1.4)
EST. AVERAGE GLUCOSE BLD GHB EST-MCNC: 123 MG/DL
FASTING STATUS PATIENT QL REPORTED: NORMAL
GLOBULIN SER CALC-MCNC: 2.5 G/DL (ref 1.9–3.5)
GLUCOSE SERPL-MCNC: 114 MG/DL (ref 65–99)
HBA1C MFR BLD: 5.9 % (ref 0–5.6)
HDLC SERPL-MCNC: 49 MG/DL
LDLC SERPL CALC-MCNC: 70 MG/DL
POTASSIUM SERPL-SCNC: 4.5 MMOL/L (ref 3.6–5.5)
PROT SERPL-MCNC: 6.9 G/DL (ref 6–8.2)
SODIUM SERPL-SCNC: 145 MMOL/L (ref 135–145)
TRIGL SERPL-MCNC: 168 MG/DL (ref 0–149)
TSH SERPL DL<=0.005 MIU/L-ACNC: 1.59 UIU/ML (ref 0.38–5.33)

## 2020-09-08 PROCEDURE — 36415 COLL VENOUS BLD VENIPUNCTURE: CPT

## 2020-09-08 PROCEDURE — 84443 ASSAY THYROID STIM HORMONE: CPT

## 2020-09-08 PROCEDURE — 80061 LIPID PANEL: CPT

## 2020-09-08 PROCEDURE — 83036 HEMOGLOBIN GLYCOSYLATED A1C: CPT

## 2020-09-08 PROCEDURE — 80053 COMPREHEN METABOLIC PANEL: CPT

## 2020-09-08 RX ORDER — OMEPRAZOLE 20 MG/1
CAPSULE, DELAYED RELEASE ORAL
Qty: 30 CAP | Refills: 0 | Status: SHIPPED | OUTPATIENT
Start: 2020-09-08 | End: 2020-10-07

## 2020-09-11 ENCOUNTER — OFFICE VISIT (OUTPATIENT)
Dept: MEDICAL GROUP | Facility: MEDICAL CENTER | Age: 80
End: 2020-09-11
Payer: MEDICARE

## 2020-09-11 VITALS
HEART RATE: 78 BPM | HEIGHT: 61 IN | WEIGHT: 157.96 LBS | SYSTOLIC BLOOD PRESSURE: 112 MMHG | OXYGEN SATURATION: 92 % | DIASTOLIC BLOOD PRESSURE: 62 MMHG | TEMPERATURE: 97 F | BODY MASS INDEX: 29.82 KG/M2

## 2020-09-11 DIAGNOSIS — G30.1 LATE ONSET ALZHEIMER'S DISEASE WITHOUT BEHAVIORAL DISTURBANCE (HCC): ICD-10-CM

## 2020-09-11 DIAGNOSIS — E03.9 ACQUIRED HYPOTHYROIDISM: ICD-10-CM

## 2020-09-11 DIAGNOSIS — E78.5 DYSLIPIDEMIA: ICD-10-CM

## 2020-09-11 DIAGNOSIS — N18.30 STAGE 3 CHRONIC KIDNEY DISEASE: ICD-10-CM

## 2020-09-11 DIAGNOSIS — F02.80 LATE ONSET ALZHEIMER'S DISEASE WITHOUT BEHAVIORAL DISTURBANCE (HCC): ICD-10-CM

## 2020-09-11 DIAGNOSIS — I10 ESSENTIAL HYPERTENSION: ICD-10-CM

## 2020-09-11 DIAGNOSIS — Z00.00 MEDICARE ANNUAL WELLNESS VISIT, SUBSEQUENT: ICD-10-CM

## 2020-09-11 DIAGNOSIS — R73.02 IGT (IMPAIRED GLUCOSE TOLERANCE): ICD-10-CM

## 2020-09-11 DIAGNOSIS — Z91.81 RISK FOR FALLS: ICD-10-CM

## 2020-09-11 DIAGNOSIS — I47.10 SVT (SUPRAVENTRICULAR TACHYCARDIA) (HCC): ICD-10-CM

## 2020-09-11 DIAGNOSIS — F32.1 CURRENT MODERATE EPISODE OF MAJOR DEPRESSIVE DISORDER, UNSPECIFIED WHETHER RECURRENT (HCC): ICD-10-CM

## 2020-09-11 PROCEDURE — 8041 PR SCP AHA: Performed by: INTERNAL MEDICINE

## 2020-09-11 PROCEDURE — 99213 OFFICE O/P EST LOW 20 MIN: CPT | Mod: 25 | Performed by: INTERNAL MEDICINE

## 2020-09-11 PROCEDURE — G0439 PPPS, SUBSEQ VISIT: HCPCS | Performed by: INTERNAL MEDICINE

## 2020-09-11 RX ORDER — SIMVASTATIN 40 MG
40 TABLET ORAL EVERY EVENING
Qty: 100 TAB | Refills: 3 | Status: SHIPPED | OUTPATIENT
Start: 2020-09-11 | End: 2021-11-05 | Stop reason: SDUPTHER

## 2020-09-11 RX ORDER — ZONISAMIDE 100 MG/1
100 CAPSULE ORAL DAILY
Qty: 90 CAP | Refills: 3 | Status: SHIPPED | OUTPATIENT
Start: 2020-09-11 | End: 2021-09-29 | Stop reason: SDUPTHER

## 2020-09-11 RX ORDER — POTASSIUM CHLORIDE 750 MG/1
10 TABLET, FILM COATED, EXTENDED RELEASE ORAL DAILY
Qty: 180 TAB | Refills: 0 | Status: SHIPPED | OUTPATIENT
Start: 2020-09-11 | End: 2020-11-02 | Stop reason: SDUPTHER

## 2020-09-11 RX ORDER — ESCITALOPRAM OXALATE 10 MG/1
TABLET ORAL
Qty: 100 TAB | Refills: 3 | Status: SHIPPED | OUTPATIENT
Start: 2020-09-11 | End: 2021-11-01 | Stop reason: SDUPTHER

## 2020-09-11 RX ORDER — LEVOTHYROXINE SODIUM 88 UG/1
44 TABLET ORAL
Qty: 45 TAB | Refills: 3 | Status: SHIPPED | OUTPATIENT
Start: 2020-09-11 | End: 2021-11-16 | Stop reason: SDUPTHER

## 2020-09-11 RX ORDER — FUROSEMIDE 40 MG/1
40 TABLET ORAL DAILY
Qty: 100 TAB | Refills: 3 | Status: SHIPPED | OUTPATIENT
Start: 2020-09-11 | End: 2021-08-11 | Stop reason: SDUPTHER

## 2020-09-11 RX ORDER — MEMANTINE HYDROCHLORIDE 28 MG/1
28 CAPSULE, EXTENDED RELEASE ORAL DAILY
Qty: 90 CAP | Refills: 3 | Status: SHIPPED | OUTPATIENT
Start: 2020-09-11 | End: 2021-10-04 | Stop reason: SDUPTHER

## 2020-09-11 RX ORDER — METOPROLOL SUCCINATE 25 MG/1
25 TABLET, EXTENDED RELEASE ORAL DAILY
Qty: 100 TAB | Refills: 1 | Status: SHIPPED | OUTPATIENT
Start: 2020-09-11 | End: 2021-02-22 | Stop reason: SDUPTHER

## 2020-09-11 ASSESSMENT — PATIENT HEALTH QUESTIONNAIRE - PHQ9
SUM OF ALL RESPONSES TO PHQ QUESTIONS 1-9: 9
CLINICAL INTERPRETATION OF PHQ2 SCORE: 2
5. POOR APPETITE OR OVEREATING: 2 - MORE THAN HALF THE DAYS

## 2020-09-11 ASSESSMENT — ENCOUNTER SYMPTOMS: GENERAL WELL-BEING: GOOD

## 2020-09-11 ASSESSMENT — ACTIVITIES OF DAILY LIVING (ADL): BATHING_REQUIRES_ASSISTANCE: 0

## 2020-09-11 NOTE — PROGRESS NOTES
Annual Health Assessment Questions:    1.  Are you currently engaging in any exercise or physical activity? No    2.  How would you describe your mood or emotional well-being today? fair    3.  Have you had any falls in the last year? Yes    4.  Have you noticed any problems with your balance or had difficulty walking? Yes    5.  In the last six months have you experienced any leakage of urine? Yes    6. DPA/Advanced Directive: Patient does not have an Advanced Directive.  A packet and workshop information was given on Advanced Directives.

## 2020-09-11 NOTE — PROGRESS NOTES
Annual Health Assessment Questions:    1.  Are you currently engaging in any exercise or physical activity? No    2.  How would you describe your mood or emotional well-being today? fair    3.  Have you had any falls in the last year? Yes    4.  Have you noticed any problems with your balance or had difficulty walking? Yes    5.  In the last six months have you experienced any leakage of urine? Yes    6. DPA/Advanced Directive: Patient does not have an Advanced Directive.  A packet and workshop information was given on Advanced Directives.        CC: Dementia, blood sugars hypertension and thyroid due for wellness examination.    HPI:   Jill presents today with the following.      1. Medicare annual wellness visit, subsequent  Screenings performed below information given on advanced directives    2. Late onset Alzheimer's disease without behavioral disturbance (HCC)  Presents initially diagnosed with mild cognitive impairment there does seem to be advancement Namenda was somewhat helpful.  Although not diagnosed by neurology do believe there is some late onset dementia.  She is stable and tolerating meds.    3. IGT (impaired glucose tolerance)  Recent A1c found to be 5.9 stable from previous.  Weight is trending up slightly.    4. Essential hypertension  Detailed blood pressure medications well-controlled on current regimen.    5. Acquired hypothyroidism  Thyroid adequately replaced on recent blood work needing refills no hair or skin changes.          Information for advance directives given to patient or instructed to bring in advance directives into to office to put in chart.      Depression Screening    Little interest or pleasure in doing things?  0 - not at all  Feeling down, depressed , or hopeless? 2 - more than half the days  Trouble falling or staying asleep, or sleeping too much?  1 - several days  Feeling tired or having little energy?  2 - more than half the days  Poor appetite or overeating?  2 -  more than half the days  Feeling bad about yourself - or that you are a failure or have let yourself or your family down? 1 - several days  Trouble concentrating on things, such as reading the newspaper or watching television? 1 - several days  Moving or speaking so slowly that other people could have noticed.  Or the opposite - being so fidgety or restless that you have been moving around a lot more than usual?  0 - not at all  Thoughts that you would be better off dead, or of hurting yourself?  0 - not at all  Patient Health Questionnaire Score: 9    If depressive symptoms identified deferred to follow up visit unless specifically addressed in assessment and plan.    Interpretation of PHQ-9 Total Score   Score Severity   1-4 No Depression   5-9 Mild Depression   10-14 Moderate Depression   15-19 Moderately Severe Depression   20-27 Severe Depression      Screening for Cognitive Impairment    Three Minute Recall (river, nation, finger) 0/3    Ck clock face with all 12 numbers and set the hands to show 10 past 11.  Yes 5/5  Cognitive concerns identified deferred for follow up unless specifically addressed in assessment and plan.    Fall Risk Assessment    Has the patient had two or more falls in the last year or any fall with injury in the last year?  Yes    Safety Assessment    Throw rugs on floor.  No  Handrails on all stairs.  Yes  Good lighting in all hallways.  Yes  Difficulty hearing.  Yes  Patient counseled about all safety risks that were identified.    Functional Assessment ADLs    Are there any barriers preventing you from cooking for yourself or meeting nutritional needs?  No.    Are there any barriers preventing you from driving safely or obtaining transportation?  Yes.    Are there any barriers preventing you from using a telephone or calling for help?  No.    Are there any barriers preventing you from shopping?  Yes.    Are there any barriers preventing you from taking care of your own finances?   Yes.    Are there any barriers preventing you from managing your medications?  Yes.    Are there any barriers preventing you from showering, bathing or dressing yourself? No.    Are you currently engaging in any exercise or physical activity?  No.     What is your perception of your health?  Good.      Health Maintenance Summary                BONE DENSITY Overdue 10/17/2019      Previously completed 10/17/2014      Patient has more history with this topic...    IMM INFLUENZA Overdue 9/1/2020      Done 9/17/2019 Imm Admin: Influenza Vaccine Adult HD     Patient has more history with this topic...    Annual Wellness Visit Overdue 9/9/2020      Done 9/9/2019 SUBSEQUENT ANNUAL WELLNESS VISIT-INCLUDES PPPS ()     Patient has more history with this topic...    IMM ZOSTER VACCINES Postponed 9/18/2031 Originally 8/20/1990. Insurance/Financial    COLONOSCOPY Next Due 8/21/2023      Done 8/21/2013 AMB REFERRAL TO GI FOR COLONOSCOPY    IMM DTaP/Tdap/Td Vaccine Next Due 4/18/2025      Done 4/18/2015 Imm Admin: Tdap Vaccine          Patient Care Team:  Hector Neville M.D. as PCP - General (Internal Medicine)  Tereso Hearn M.D. as Consulting Physician (Cardiology)  Micaela Judd M.D. as Consulting Physician (Orthopaedics)  Bunny Sorto M.D. as Consulting Physician (Neurology)  Elvira Fu, [facility] as            Health Care Screening: Age-appropriate preventive services that Medicare covers were discussed today and ordered as indicated and agreed upon by the patient, and as recommended by USPTF and ACIP.     Patient Active Problem List    Diagnosis Date Noted   • Acquired hypothyroidism 05/08/2014     Priority: Medium     Class: Chronic   • IGT (impaired glucose tolerance) 05/08/2014     Priority: Medium     Class: Chronic   • Essential hypertension 07/05/2011     Priority: Medium   • Dyslipidemia 07/05/2011     Priority: Medium   • Stage 3 chronic kidney disease (HCC) 09/11/2020    • Late onset Alzheimer's disease without behavioral disturbance ([facility]) 09/11/2020   • Risk for falls 09/11/2020   • SVT (supraventricular tachycardia) ([facility]) 09/09/2019   • Depression 01/02/2019   • Primary osteoarthritis of left knee 04/26/2018   • Mild cognitive disorder 03/27/2018   • Gastroesophageal reflux disease without esophagitis 08/29/2016       Current Outpatient Medications   Medication Sig Dispense Refill   • simvastatin (ZOCOR) 40 MG Tab Take 1 Tab by mouth every evening. 100 Tab 3   • metoprolol SR (TOPROL XL) 25 MG TABLET SR 24 HR Take 1 Tab by mouth every day. 100 Tab 1   • potassium chloride ER (KLOR-CON) 10 MEQ tablet Take 1 Tab by mouth every day. 180 Tab 0   • furosemide (LASIX) 40 MG Tab Take 1 Tab by mouth every day. 100 Tab 3   • Memantine HCl ER (NAMENDA) 28 MG CAPSULE SR 24 HR Take 1 Cap by mouth every day. 90 Cap 3   • zonisamide (ZONEGRAN) 100 MG Cap Take 1 Cap by mouth every day. 90 Cap 3   • escitalopram (LEXAPRO) 10 MG Tab TAKE 1 TABLET BY MOUTH ONCE DAILY 100 Tab 3   • levothyroxine (SYNTHROID) 88 MCG Tab Take 0.5 Tabs by mouth Every morning on an empty stomach. 45 Tab 3   • omeprazole (PRILOSEC) 20 MG delayed-release capsule Take 1 capsule by mouth once daily 30 Cap 0   • acetaminophen (TYLENOL) 500 MG Tab Take 1.5 Tabs by mouth every 6 hours. 30 Tab 0   • aspirin EC 81 MG EC tablet Take 1 Tab by mouth 2 times a day. 60 Tab 0   • Cyanocobalamin (VITAMIN B-12) 1000 MCG Tab Take 1,000 mcg by mouth every day.     • VITAMIN D, CHOLECALCIFEROL, PO Take 2,000 Units by mouth every day.     • furosemide (LASIX) 20 MG Tab Take 1 Tab by mouth every day. (Patient not taking: Reported on 9/11/2020) 90 Tab 3     No current facility-administered medications for this visit.        Family History   Problem Relation Age of Onset   • Lung Disease Father    • Heart Disease Father    • Cancer Mother         transitional caner    • Arrythmia Sister    • Hypertension Brother    • No Known Problems Sister     • No Known Problems Sister    • No Known Problems Sister        Social History     Socioeconomic History   • Marital status:      Spouse name: Not on file   • Number of children: Not on file   • Years of education: Not on file   • Highest education level: Not on file   Occupational History   • Not on file   Social Needs   • Financial resource strain: Not on file   • Food insecurity     Worry: Not on file     Inability: Not on file   • Transportation needs     Medical: Not on file     Non-medical: Not on file   Tobacco Use   • Smoking status: Former Smoker     Packs/day: 1.00     Years: 12.00     Pack years: 12.00     Quit date: 1975     Years since quittin.7   • Smokeless tobacco: Never Used   Substance and Sexual Activity   • Alcohol use: Yes     Alcohol/week: 0.0 oz     Comment: twice a year   • Drug use: No   • Sexual activity: Not Currently   Lifestyle   • Physical activity     Days per week: Not on file     Minutes per session: Not on file   • Stress: Not on file   Relationships   • Social connections     Talks on phone: Not on file     Gets together: Not on file     Attends [employer] service: Not on file     Active member of club or organization: Not on file     Attends meetings of clubs or organizations: Not on file     Relationship status: Not on file   • Intimate partner violence     Fear of current or ex partner: Not on file     Emotionally abused: Not on file     Physically abused: Not on file     Forced sexual activity: Not on file   Other Topics Concern   • Not on file   Social History Narrative   • Not on file       Past Surgical History:   Procedure Laterality Date   • KNEE ARTHROPLASTY TOTAL Left 2018    Procedure: KNEE ARTHROPLASTY TOTAL;  Surgeon: Micaela Judd M.D.;  Location: SURGERY [facility];  Service: Orthopedics   • COLPOSACROPEXY ROBOTIC  2011    Performed by [facility] at SURGERY [facility]   • CYSTOSCOPY  2011    Performed by  "[facility] at SURGERY [facility]   • HYSTERECTOMY, TOTAL ABDOMINAL  1989   • CHOLECYSTECTOMY  1971   • BLADDER SLING FEMALE  approx 2008 & 2010    bladder slings x2   • CYSTOSCOPY  1990's    bladder stones       Allergies as of 09/11/2020 - Reviewed 09/11/2020   Allergen Reaction Noted   • Nsaids  07/05/2011   • Demerol Vomiting 11/14/2011        ROS: Denies Chest pain, SOB, LE edema.    /62 (BP Location: Right arm, Patient Position: Sitting)   Pulse 78   Temp 36.1 °C (97 °F)   Ht 1.549 m (5' 1\")   Wt 71.6 kg (157 lb 15.4 oz)   SpO2 92%   BMI 29.85 kg/m²     Physical Exam:  Gen:         Alert and oriented, No apparent distress.  Neck:        No Lymphadenopathy or Bruits.  Lungs:     Clear to auscultation bilaterally  CV:          Regular rate and rhythm. No murmurs, rubs or gallops.  Abd:         Soft non tender, non distended. Normal active bowel sounds.  No  Hepatosplenomegaly, No pulsatile masses.                   Ext:          No clubbing, cyanosis, edema.      Assessment and Plan.   80 y.o. female with the following issues.    1. Medicare annual wellness visit, subsequent  Discussed healthy lifestyle habits as well as screening regimens.  Discussion about safe lifestyle practices, seatbelts, sunscreen, dietary recommendations.  - Subsequent Annual Wellness Visit - Includes PPPS ()    2. Late onset Alzheimer's disease without behavioral disturbance (HCC)  Stable she will establish with new neurology they are planning on moving out of the area in the future but not sure when.  Will establish if they are going to be here for an extended stay.    3. IGT (impaired glucose tolerance)  Discussed diet exercise.    4. Essential hypertension  Currently well controlled, Discuss diet, exercise and salt restriction.  No change to medication therapy.  - metoprolol SR (TOPROL XL) 25 MG TABLET SR 24 HR; Take 1 Tab by mouth every day.  Dispense: 100 Tab; Refill: 1    5. Acquired " hypothyroidism  Currently adequately replaced, recheck 6 months to one year.  - Subsequent Annual Wellness Visit - Includes PPPS ()  - levothyroxine (SYNTHROID) 88 MCG Tab; Take 0.5 Tabs by mouth Every morning on an empty stomach.  Dispense: 45 Tab; Refill: 3    6. Dyslipidemia  Lipids currently well controlled. Discussed continued diet and exercise recheck 6 months to 1 year.  - Subsequent Annual Wellness Visit - Includes PPPS ()  - simvastatin (ZOCOR) 40 MG Tab; Take 1 Tab by mouth every evening.  Dispense: 100 Tab; Refill: 3    7. Current moderate episode of major depressive disorder, unspecified whether recurrent (HCC)  Stable no major depressive symptoms  - Subsequent Annual Wellness Visit - Includes PPPS ()    8. SVT (supraventricular tachycardia) ([facility])  Denying any breakthrough symptomology  - Subsequent Annual Wellness Visit - Includes PPPS ()    9. Stage 3 chronic kidney disease (HCC)  Dust avoidance of nephrotoxins  - Subsequent Annual Wellness Visit - Includes PPPS ()    10. Risk for falls  Precautions given  - Subsequent Annual Wellness Visit - Includes PPPS ()  - Patient identified as fall risk.  Appropriate orders and counseling given.        Referrals offered: Community-based lifestyle interventions to reduce health risks and promote self-management and wellness, fall prevention, nutrition, physical activity, tobacco-use cessation, weight loss, and mental health services as per orders if indicated.    Discussion today about general wellness and lifestyle habits:    · Prevent falls and reduce trip hazards; Cautioned about securing or removing rugs.  · Have a working fire alarm and carbon monoxide detector;   · Engage in regular physical activity and social activities

## 2020-10-07 DIAGNOSIS — K21.9 GASTROESOPHAGEAL REFLUX DISEASE WITHOUT ESOPHAGITIS: ICD-10-CM

## 2020-10-07 RX ORDER — OMEPRAZOLE 20 MG/1
CAPSULE, DELAYED RELEASE ORAL
Qty: 100 CAP | Refills: 2 | Status: SHIPPED | OUTPATIENT
Start: 2020-10-07 | End: 2021-09-30 | Stop reason: SDUPTHER

## 2020-11-03 RX ORDER — POTASSIUM CHLORIDE 750 MG/1
10 TABLET, FILM COATED, EXTENDED RELEASE ORAL DAILY
Qty: 180 TAB | Refills: 0 | Status: SHIPPED | OUTPATIENT
Start: 2020-11-03 | End: 2021-03-05 | Stop reason: SDUPTHER

## 2020-11-09 ENCOUNTER — OFFICE VISIT (OUTPATIENT)
Dept: MEDICAL GROUP | Facility: MEDICAL CENTER | Age: 80
End: 2020-11-09
Payer: MEDICARE

## 2020-11-09 VITALS
OXYGEN SATURATION: 94 % | TEMPERATURE: 97.6 F | BODY MASS INDEX: 29.45 KG/M2 | WEIGHT: 156 LBS | HEART RATE: 100 BPM | RESPIRATION RATE: 16 BRPM | HEIGHT: 61 IN | SYSTOLIC BLOOD PRESSURE: 120 MMHG | DIASTOLIC BLOOD PRESSURE: 76 MMHG

## 2020-11-09 DIAGNOSIS — K21.9 GASTROESOPHAGEAL REFLUX DISEASE WITHOUT ESOPHAGITIS: ICD-10-CM

## 2020-11-09 DIAGNOSIS — F02.80 LATE ONSET ALZHEIMER'S DISEASE WITHOUT BEHAVIORAL DISTURBANCE (HCC): ICD-10-CM

## 2020-11-09 DIAGNOSIS — I10 ESSENTIAL HYPERTENSION: ICD-10-CM

## 2020-11-09 DIAGNOSIS — F33.42 RECURRENT MAJOR DEPRESSIVE DISORDER, IN FULL REMISSION (HCC): ICD-10-CM

## 2020-11-09 DIAGNOSIS — R73.02 IGT (IMPAIRED GLUCOSE TOLERANCE): ICD-10-CM

## 2020-11-09 DIAGNOSIS — E78.5 DYSLIPIDEMIA: ICD-10-CM

## 2020-11-09 DIAGNOSIS — N18.30 STAGE 3 CHRONIC KIDNEY DISEASE, UNSPECIFIED WHETHER STAGE 3A OR 3B CKD: ICD-10-CM

## 2020-11-09 DIAGNOSIS — I47.10 SVT (SUPRAVENTRICULAR TACHYCARDIA) (HCC): ICD-10-CM

## 2020-11-09 DIAGNOSIS — G30.1 LATE ONSET ALZHEIMER'S DISEASE WITHOUT BEHAVIORAL DISTURBANCE (HCC): ICD-10-CM

## 2020-11-09 DIAGNOSIS — E03.9 ACQUIRED HYPOTHYROIDISM: ICD-10-CM

## 2020-11-09 PROBLEM — F09 MILD COGNITIVE DISORDER: Status: RESOLVED | Noted: 2018-03-27 | Resolved: 2020-11-09

## 2020-11-09 PROBLEM — F32.A DEPRESSION: Status: RESOLVED | Noted: 2019-01-02 | Resolved: 2020-11-09

## 2020-11-09 PROCEDURE — 99214 OFFICE O/P EST MOD 30 MIN: CPT | Performed by: FAMILY MEDICINE

## 2020-11-09 RX ORDER — ASPIRIN 81 MG/1
81 TABLET ORAL NIGHTLY
COMMUNITY

## 2020-11-09 RX ORDER — PYRIDOXINE HCL (VITAMIN B6) 50 MG
100 TABLET ORAL DAILY
COMMUNITY

## 2020-11-09 NOTE — PROGRESS NOTES
cc: Hypothyroidism    Subjective:     Jill Diaz is a 80 y.o. female presenting with her  to establish care:    1.  Hypothyroidism: Has a history of hypothyroidism, has been stable.  Is currently on levothyroxine 44 mcg daily.  Denies any side effects.  Denies any heat/cold intolerance, diarrhea/constipation, abdominal pain, tremors, skin changes, palpitations.    2.  Hypertension, SVT: Has a history of hypertension and SVT, has been stable.  Sees cardiology regularly.  Is currently on a baby aspirin, furosemide 40 mg daily, metoprolol 25 mg daily, potassium 10 mEq twice a day.  Denies any side effects.  Denies any chest pain, shortness of breath, lightheadedness, dizziness, leg swelling.    3.  Dyslipidemia: Has a history of dyslipidemia, has been stable.  Is currently on simvastatin 40 mg daily.  Denies any side effects.    4.  GERD: Has a history of GERD, has been stable on omeprazole.    5.  Depression: Has a history of recurrent depression, is well controlled currently on the Lexapro 10 mg daily.  Denies any side effects.  Denies any suicidal thoughts    6.  Alzheimer's: Has had memory changes, possible Alzheimer's.  She has noted significant improvement in her memory with the memantine.  She also takes zonisamide as her neurologist was concerned for possible seizures.  She denies any significant side effects.  She does feel like she has dreams and talks in her sleep, which she attributes to her medications.  It is not significant for her.    7.  Prediabetes: Has been stable, watches her diet carefully.  Her last A1c was 5.9% 9/2020      Review of systems:  See above.       Current Outpatient Medications:   •  pyridoxine (VITAMIN B-6) 50 MG Tab, Take 100 mg by mouth every day., Disp: , Rfl:   •  aspirin 81 MG EC tablet, Take 81 mg by mouth every evening., Disp: , Rfl:   •  potassium chloride ER (KLOR-CON) 10 MEQ tablet, Take 1 Tab by mouth every day. (Patient taking differently: Take 10 mEq  "by mouth 2 times a day.), Disp: 180 Tab, Rfl: 0  •  omeprazole (PRILOSEC) 20 MG delayed-release capsule, Take 1 capsule by mouth once daily, Disp: 100 Cap, Rfl: 2  •  simvastatin (ZOCOR) 40 MG Tab, Take 1 Tab by mouth every evening., Disp: 100 Tab, Rfl: 3  •  metoprolol SR (TOPROL XL) 25 MG TABLET SR 24 HR, Take 1 Tab by mouth every day., Disp: 100 Tab, Rfl: 1  •  furosemide (LASIX) 40 MG Tab, Take 1 Tab by mouth every day., Disp: 100 Tab, Rfl: 3  •  Memantine HCl ER (NAMENDA) 28 MG CAPSULE SR 24 HR, Take 1 Cap by mouth every day., Disp: 90 Cap, Rfl: 3  •  zonisamide (ZONEGRAN) 100 MG Cap, Take 1 Cap by mouth every day., Disp: 90 Cap, Rfl: 3  •  escitalopram (LEXAPRO) 10 MG Tab, TAKE 1 TABLET BY MOUTH ONCE DAILY, Disp: 100 Tab, Rfl: 3  •  levothyroxine (SYNTHROID) 88 MCG Tab, Take 0.5 Tabs by mouth Every morning on an empty stomach., Disp: 45 Tab, Rfl: 3  •  Cyanocobalamin (VITAMIN B-12) 1000 MCG Tab, Take 1,000 mcg by mouth every day., Disp: , Rfl:   •  VITAMIN D, CHOLECALCIFEROL, PO, Take 2,000 Units by mouth every day., Disp: , Rfl:     Allergies, past medical history, past surgical history, family history, social history reviewed and updated    Objective:     Vitals: /76   Pulse 100   Temp 36.4 °C (97.6 °F)   Resp 16   Ht 1.549 m (5' 1\")   Wt 70.8 kg (156 lb)   SpO2 94%   BMI 29.48 kg/m²   General: Alert, pleasant, NAD  HEENT: Normocephalic.   Heart: Regular rate and rhythm.  S1 and S2 normal.  No murmurs appreciated.  Respiratory: Normal respiratory effort.  Clear to auscultation bilaterally.  Abdomen: Non-distended, soft  Skin: Warm, dry, no rashes.  Musculoskeletal: Gait is normal.  Moves all extremities well.  Extremities: No leg edema.  Psych:  Affect/mood is normal, judgement is good, memory is intact, grooming is appropriate.    Assessment/Plan:     Jill was seen today for establish care.    Diagnoses and all orders for this visit:    Acquired hypothyroidism  New problem, stable, " continue current medications.    Essential hypertension  SVT (supraventricular tachycardia) (HCC)  New problem, stable, continue current medications.    Dyslipidemia  New problem, stable, continue current medications.    Gastroesophageal reflux disease without esophagitis  New problem, stable, continue current medications.    Stage 3 chronic kidney disease, unspecified whether stage 3a or 3b CKD  New problem, stable, continue to monitor  Recurrent major depressive disorder, in full remission (HCC)  New problem, stable, continue current medications.    Late onset Alzheimer's disease without behavioral disturbance (HCC)  New problem, stable, continue current medications.    IGT (impaired glucose tolerance)  New problem, stable, continue to monitor        Return in about 6 months (around 5/9/2021) for routine follow up.

## 2021-01-11 DIAGNOSIS — Z23 NEED FOR VACCINATION: ICD-10-CM

## 2021-02-22 DIAGNOSIS — I10 ESSENTIAL HYPERTENSION: ICD-10-CM

## 2021-02-22 RX ORDER — METOPROLOL SUCCINATE 25 MG/1
25 TABLET, EXTENDED RELEASE ORAL DAILY
Qty: 100 TABLET | Refills: 3 | Status: SHIPPED | OUTPATIENT
Start: 2021-02-22 | End: 2022-02-07 | Stop reason: SDUPTHER

## 2021-02-22 NOTE — TELEPHONE ENCOUNTER
Received request via: Pharmacy    Was the patient seen in the last year in this department? Yes    Does the patient have an active prescription (recently filled or refills available) for medication(s) requested? No     Requested Prescriptions     Pending Prescriptions Disp Refills   • metoprolol SR (TOPROL XL) 25 MG TABLET SR 24  tablet 1     Sig: Take 1 tablet by mouth every day.

## 2021-03-05 ENCOUNTER — PATIENT MESSAGE (OUTPATIENT)
Dept: MEDICAL GROUP | Facility: MEDICAL CENTER | Age: 81
End: 2021-03-05

## 2021-03-05 DIAGNOSIS — I10 ESSENTIAL HYPERTENSION: ICD-10-CM

## 2021-03-05 DIAGNOSIS — N18.30 STAGE 3 CHRONIC KIDNEY DISEASE, UNSPECIFIED WHETHER STAGE 3A OR 3B CKD: ICD-10-CM

## 2021-03-05 DIAGNOSIS — I47.10 SVT (SUPRAVENTRICULAR TACHYCARDIA) (HCC): ICD-10-CM

## 2021-03-05 RX ORDER — POTASSIUM CHLORIDE 750 MG/1
10 TABLET, FILM COATED, EXTENDED RELEASE ORAL 2 TIMES DAILY
Qty: 180 TABLET | Refills: 1 | Status: SHIPPED | OUTPATIENT
Start: 2021-03-05 | End: 2021-09-01

## 2021-04-26 ENCOUNTER — HOSPITAL ENCOUNTER (OUTPATIENT)
Dept: LAB | Facility: MEDICAL CENTER | Age: 81
End: 2021-04-26
Attending: FAMILY MEDICINE
Payer: MEDICARE

## 2021-04-26 DIAGNOSIS — I47.10 SVT (SUPRAVENTRICULAR TACHYCARDIA) (HCC): ICD-10-CM

## 2021-04-26 DIAGNOSIS — N18.30 STAGE 3 CHRONIC KIDNEY DISEASE, UNSPECIFIED WHETHER STAGE 3A OR 3B CKD: ICD-10-CM

## 2021-04-26 DIAGNOSIS — I10 ESSENTIAL HYPERTENSION: ICD-10-CM

## 2021-04-26 LAB
ANION GAP SERPL CALC-SCNC: 9 MMOL/L (ref 7–16)
BUN SERPL-MCNC: 15 MG/DL (ref 8–22)
CALCIUM SERPL-MCNC: 9.7 MG/DL (ref 8.5–10.5)
CHLORIDE SERPL-SCNC: 107 MMOL/L (ref 96–112)
CO2 SERPL-SCNC: 26 MMOL/L (ref 20–33)
CREAT SERPL-MCNC: 1.14 MG/DL (ref 0.5–1.4)
GLUCOSE SERPL-MCNC: 95 MG/DL (ref 65–99)
POTASSIUM SERPL-SCNC: 4.3 MMOL/L (ref 3.6–5.5)
SODIUM SERPL-SCNC: 142 MMOL/L (ref 135–145)

## 2021-04-26 PROCEDURE — 80048 BASIC METABOLIC PNL TOTAL CA: CPT

## 2021-04-26 PROCEDURE — 36415 COLL VENOUS BLD VENIPUNCTURE: CPT

## 2021-05-19 ENCOUNTER — PATIENT MESSAGE (OUTPATIENT)
Dept: HEALTH INFORMATION MANAGEMENT | Facility: OTHER | Age: 81
End: 2021-05-19

## 2021-06-03 ENCOUNTER — PATIENT OUTREACH (OUTPATIENT)
Dept: HEALTH INFORMATION MANAGEMENT | Facility: OTHER | Age: 81
End: 2021-06-03

## 2021-06-03 NOTE — PROGRESS NOTES
Outcome: Left Messaget for Comprehensive Health Assessment    Please transfer to Patient Outreach Team at [phone] when patient returns call.      Attempt # 2

## 2021-08-11 RX ORDER — FUROSEMIDE 40 MG/1
40 TABLET ORAL DAILY
Qty: 100 TABLET | Refills: 0 | Status: SHIPPED | OUTPATIENT
Start: 2021-08-11 | End: 2022-02-07 | Stop reason: SDUPTHER

## 2021-09-01 RX ORDER — POTASSIUM CHLORIDE 750 MG/1
10 TABLET, FILM COATED, EXTENDED RELEASE ORAL 2 TIMES DAILY
Qty: 180 TABLET | Refills: 0 | Status: SHIPPED | OUTPATIENT
Start: 2021-09-01 | End: 2021-11-29 | Stop reason: SDUPTHER

## 2021-09-29 RX ORDER — ZONISAMIDE 100 MG/1
100 CAPSULE ORAL DAILY
Qty: 90 CAPSULE | Refills: 0 | Status: SHIPPED | OUTPATIENT
Start: 2021-09-29 | End: 2021-12-27 | Stop reason: SDUPTHER

## 2021-09-30 DIAGNOSIS — K21.9 GASTROESOPHAGEAL REFLUX DISEASE WITHOUT ESOPHAGITIS: ICD-10-CM

## 2021-09-30 RX ORDER — OMEPRAZOLE 20 MG/1
20 CAPSULE, DELAYED RELEASE ORAL DAILY
Qty: 100 CAPSULE | Refills: 0 | Status: SHIPPED | OUTPATIENT
Start: 2021-09-30 | End: 2021-10-07 | Stop reason: SDUPTHER

## 2021-10-04 RX ORDER — MEMANTINE HYDROCHLORIDE 28 MG/1
28 CAPSULE, EXTENDED RELEASE ORAL DAILY
Qty: 90 CAPSULE | Refills: 0 | Status: SHIPPED | OUTPATIENT
Start: 2021-10-04 | End: 2021-12-31 | Stop reason: SDUPTHER

## 2021-10-07 DIAGNOSIS — K21.9 GASTROESOPHAGEAL REFLUX DISEASE WITHOUT ESOPHAGITIS: ICD-10-CM

## 2021-10-08 RX ORDER — OMEPRAZOLE 20 MG/1
20 CAPSULE, DELAYED RELEASE ORAL DAILY
Qty: 100 CAPSULE | Refills: 0 | Status: SHIPPED | OUTPATIENT
Start: 2021-10-08 | End: 2021-12-27 | Stop reason: SDUPTHER

## 2021-11-01 RX ORDER — ESCITALOPRAM OXALATE 10 MG/1
10 TABLET ORAL DAILY
Qty: 100 TABLET | Refills: 0 | Status: SHIPPED | OUTPATIENT
Start: 2021-11-01 | End: 2022-01-28 | Stop reason: SDUPTHER

## 2021-11-05 ENCOUNTER — PATIENT MESSAGE (OUTPATIENT)
Dept: MEDICAL GROUP | Facility: MEDICAL CENTER | Age: 81
End: 2021-11-05

## 2021-11-05 DIAGNOSIS — E78.5 DYSLIPIDEMIA: ICD-10-CM

## 2021-11-05 NOTE — TELEPHONE ENCOUNTER
----- Message from Jill Diaz sent at 2021 10:11 AM PDT -----  Regarding: Need medication  My prescription for Semvitatin 40mg , and the pharmacy has been unable to renew it.  Can you please take care of this for me?u

## 2021-11-06 RX ORDER — SIMVASTATIN 40 MG
40 TABLET ORAL EVERY EVENING
Qty: 100 TABLET | Refills: 0 | Status: SHIPPED | OUTPATIENT
Start: 2021-11-06 | End: 2021-11-08 | Stop reason: SDUPTHER

## 2021-11-08 ENCOUNTER — TELEPHONE (OUTPATIENT)
Dept: MEDICAL GROUP | Facility: MEDICAL CENTER | Age: 81
End: 2021-11-08

## 2021-11-08 DIAGNOSIS — E78.5 DYSLIPIDEMIA: ICD-10-CM

## 2021-11-08 RX ORDER — SIMVASTATIN 40 MG
40 TABLET ORAL EVERY EVENING
Qty: 100 TABLET | Refills: 0 | Status: SHIPPED | OUTPATIENT
Start: 2021-11-08 | End: 2022-02-03 | Stop reason: SDUPTHER

## 2021-11-08 NOTE — TELEPHONE ENCOUNTER
1. Caller Name: Jill Diaz                        Call Back Number: [phone]      How would the patient prefer to be contacted with a response: [facility] [employer] message    2. SPECIFIC Action To Be Taken: Pt would like to know if she needs to complete labs, before of her appt on 12/13th.    3. Diagnosis/Clinical Reason for Request: Labs   orders.    4. Specialty & Provider Name/Lab/Imaging Location: [facility]    5. Is appointment scheduled for requested order/referral: no    Patient was informed they will receive a return phone call from the office ONLY if there are any questions before processing their request. Advised to call back if they haven't received a call from the referral department in 5 days.

## 2021-11-29 RX ORDER — POTASSIUM CHLORIDE 750 MG/1
10 TABLET, FILM COATED, EXTENDED RELEASE ORAL 2 TIMES DAILY
Qty: 180 TABLET | Refills: 0 | Status: SHIPPED | OUTPATIENT
Start: 2021-11-29 | End: 2022-02-26 | Stop reason: SDUPTHER

## 2021-12-27 DIAGNOSIS — K21.9 GASTROESOPHAGEAL REFLUX DISEASE WITHOUT ESOPHAGITIS: ICD-10-CM

## 2021-12-27 RX ORDER — ZONISAMIDE 100 MG/1
100 CAPSULE ORAL DAILY
Qty: 90 CAPSULE | Refills: 0 | Status: SHIPPED | OUTPATIENT
Start: 2021-12-27 | End: 2022-04-01 | Stop reason: SDUPTHER

## 2021-12-27 RX ORDER — OMEPRAZOLE 20 MG/1
20 CAPSULE, DELAYED RELEASE ORAL DAILY
Qty: 100 CAPSULE | Refills: 0 | Status: SHIPPED | OUTPATIENT
Start: 2021-12-27 | End: 2022-01-10 | Stop reason: SDUPTHER

## 2022-01-02 RX ORDER — MEMANTINE HYDROCHLORIDE 28 MG/1
28 CAPSULE, EXTENDED RELEASE ORAL DAILY
Qty: 90 CAPSULE | Refills: 1 | Status: SHIPPED | OUTPATIENT
Start: 2022-01-02 | End: 2022-03-31 | Stop reason: SDUPTHER

## 2022-01-10 ENCOUNTER — TELEPHONE (OUTPATIENT)
Dept: MEDICAL GROUP | Facility: MEDICAL CENTER | Age: 82
End: 2022-01-10

## 2022-01-10 ENCOUNTER — OFFICE VISIT (OUTPATIENT)
Dept: MEDICAL GROUP | Facility: MEDICAL CENTER | Age: 82
End: 2022-01-10
Payer: MEDICARE

## 2022-01-10 ENCOUNTER — HOSPITAL ENCOUNTER (OUTPATIENT)
Dept: LAB | Facility: MEDICAL CENTER | Age: 82
End: 2022-01-10
Attending: FAMILY MEDICINE
Payer: MEDICARE

## 2022-01-10 VITALS
DIASTOLIC BLOOD PRESSURE: 70 MMHG | HEART RATE: 64 BPM | RESPIRATION RATE: 16 BRPM | SYSTOLIC BLOOD PRESSURE: 120 MMHG | OXYGEN SATURATION: 96 % | HEIGHT: 61 IN | TEMPERATURE: 97.6 F | BODY MASS INDEX: 30.21 KG/M2 | WEIGHT: 160 LBS

## 2022-01-10 DIAGNOSIS — I47.10 SVT (SUPRAVENTRICULAR TACHYCARDIA) (HCC): ICD-10-CM

## 2022-01-10 DIAGNOSIS — E03.9 ACQUIRED HYPOTHYROIDISM: ICD-10-CM

## 2022-01-10 DIAGNOSIS — G31.9 CEREBRAL ATROPHY (HCC): ICD-10-CM

## 2022-01-10 DIAGNOSIS — N18.30 STAGE 3 CHRONIC KIDNEY DISEASE, UNSPECIFIED WHETHER STAGE 3A OR 3B CKD: ICD-10-CM

## 2022-01-10 DIAGNOSIS — R73.02 IGT (IMPAIRED GLUCOSE TOLERANCE): ICD-10-CM

## 2022-01-10 DIAGNOSIS — K21.9 GASTROESOPHAGEAL REFLUX DISEASE WITHOUT ESOPHAGITIS: ICD-10-CM

## 2022-01-10 DIAGNOSIS — I70.0 ATHEROSCLEROSIS OF AORTA (HCC): ICD-10-CM

## 2022-01-10 DIAGNOSIS — L57.0 ACTINIC KERATOSIS: ICD-10-CM

## 2022-01-10 DIAGNOSIS — I10 ESSENTIAL HYPERTENSION: ICD-10-CM

## 2022-01-10 DIAGNOSIS — E78.5 DYSLIPIDEMIA: ICD-10-CM

## 2022-01-10 DIAGNOSIS — F33.42 RECURRENT MAJOR DEPRESSIVE DISORDER, IN FULL REMISSION (HCC): ICD-10-CM

## 2022-01-10 DIAGNOSIS — G30.1 LATE ONSET ALZHEIMER'S DISEASE WITHOUT BEHAVIORAL DISTURBANCE (HCC): ICD-10-CM

## 2022-01-10 DIAGNOSIS — F02.80 LATE ONSET ALZHEIMER'S DISEASE WITHOUT BEHAVIORAL DISTURBANCE (HCC): ICD-10-CM

## 2022-01-10 DIAGNOSIS — E66.9 OBESITY (BMI 30-39.9): ICD-10-CM

## 2022-01-10 LAB
ALBUMIN SERPL BCP-MCNC: 4.8 G/DL (ref 3.2–4.9)
ALBUMIN/GLOB SERPL: 1.8 G/DL
ALP SERPL-CCNC: 66 U/L (ref 30–99)
ALT SERPL-CCNC: 18 U/L (ref 2–50)
ANION GAP SERPL CALC-SCNC: 15 MMOL/L (ref 7–16)
AST SERPL-CCNC: 26 U/L (ref 12–45)
BILIRUB SERPL-MCNC: 0.3 MG/DL (ref 0.1–1.5)
BUN SERPL-MCNC: 21 MG/DL (ref 8–22)
CALCIUM SERPL-MCNC: 9.8 MG/DL (ref 8.5–10.5)
CHLORIDE SERPL-SCNC: 106 MMOL/L (ref 96–112)
CO2 SERPL-SCNC: 22 MMOL/L (ref 20–33)
CREAT SERPL-MCNC: 1.33 MG/DL (ref 0.5–1.4)
ERYTHROCYTE [DISTWIDTH] IN BLOOD BY AUTOMATED COUNT: 50.5 FL (ref 35.9–50)
EST. AVERAGE GLUCOSE BLD GHB EST-MCNC: 140 MG/DL
FASTING STATUS PATIENT QL REPORTED: NORMAL
GLOBULIN SER CALC-MCNC: 2.6 G/DL (ref 1.9–3.5)
GLUCOSE SERPL-MCNC: 101 MG/DL (ref 65–99)
HBA1C MFR BLD: 6.5 % (ref 4–5.6)
HCT VFR BLD AUTO: 44.1 % (ref 37–47)
HGB BLD-MCNC: 14.8 G/DL (ref 12–16)
MCH RBC QN AUTO: 36.5 PG (ref 27–33)
MCHC RBC AUTO-ENTMCNC: 33.6 G/DL (ref 33.6–35)
MCV RBC AUTO: 108.6 FL (ref 81.4–97.8)
PLATELET # BLD AUTO: 256 K/UL (ref 164–446)
PMV BLD AUTO: 9.9 FL (ref 9–12.9)
POTASSIUM SERPL-SCNC: 3.9 MMOL/L (ref 3.6–5.5)
PROT SERPL-MCNC: 7.4 G/DL (ref 6–8.2)
RBC # BLD AUTO: 4.06 M/UL (ref 4.2–5.4)
SODIUM SERPL-SCNC: 143 MMOL/L (ref 135–145)
TSH SERPL DL<=0.005 MIU/L-ACNC: 1.79 UIU/ML (ref 0.38–5.33)
WBC # BLD AUTO: 8.4 K/UL (ref 4.8–10.8)

## 2022-01-10 PROCEDURE — 80053 COMPREHEN METABOLIC PANEL: CPT

## 2022-01-10 PROCEDURE — 17000 DESTRUCT PREMALG LESION: CPT | Performed by: FAMILY MEDICINE

## 2022-01-10 PROCEDURE — 36415 COLL VENOUS BLD VENIPUNCTURE: CPT

## 2022-01-10 PROCEDURE — 83036 HEMOGLOBIN GLYCOSYLATED A1C: CPT

## 2022-01-10 PROCEDURE — 99214 OFFICE O/P EST MOD 30 MIN: CPT | Mod: 25 | Performed by: FAMILY MEDICINE

## 2022-01-10 PROCEDURE — 84443 ASSAY THYROID STIM HORMONE: CPT

## 2022-01-10 PROCEDURE — 85027 COMPLETE CBC AUTOMATED: CPT

## 2022-01-10 RX ORDER — OMEPRAZOLE 20 MG/1
20 CAPSULE, DELAYED RELEASE ORAL 2 TIMES DAILY
Qty: 180 CAPSULE | Refills: 1 | Status: SHIPPED | OUTPATIENT
Start: 2022-01-10 | End: 2022-07-24 | Stop reason: SDUPTHER

## 2022-01-10 ASSESSMENT — PATIENT HEALTH QUESTIONNAIRE - PHQ9
1. LITTLE INTEREST OR PLEASURE IN DOING THINGS: NOT AT ALL
7. TROUBLE CONCENTRATING ON THINGS, SUCH AS READING THE NEWSPAPER OR WATCHING TELEVISION: NOT AT ALL
3. TROUBLE FALLING OR STAYING ASLEEP OR SLEEPING TOO MUCH: NOT AT ALL
SUM OF ALL RESPONSES TO PHQ9 QUESTIONS 1 AND 2: 0
9. THOUGHTS THAT YOU WOULD BE BETTER OFF DEAD, OR OF HURTING YOURSELF: NOT AT ALL
8. MOVING OR SPEAKING SO SLOWLY THAT OTHER PEOPLE COULD HAVE NOTICED. OR THE OPPOSITE, BEING SO FIGETY OR RESTLESS THAT YOU HAVE BEEN MOVING AROUND A LOT MORE THAN USUAL: NOT AT ALL
2. FEELING DOWN, DEPRESSED, IRRITABLE, OR HOPELESS: NOT AT ALL
6. FEELING BAD ABOUT YOURSELF - OR THAT YOU ARE A FAILURE OR HAVE LET YOURSELF OR YOUR FAMILY DOWN: NOT AL ALL
4. FEELING TIRED OR HAVING LITTLE ENERGY: NOT AT ALL
SUM OF ALL RESPONSES TO PHQ QUESTIONS 1-9: 0
5. POOR APPETITE OR OVEREATING: NOT AT ALL

## 2022-01-10 NOTE — PROGRESS NOTES
Subjective:     Jill Diaz is a 81 y.o. female presenting with her  for a follow up:    1.  Skin lesion: She noticed a skin lesion on her right cheek for the past several months.  Seems to be growing, somewhat red, dry, flaky and crusty.  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  She has not seen her dermatologist in years.    2.  Hypothyroidism: has been stable.  Is currently on levothyroxine 44 mcg daily.  Denies any side effects.      3.  Hypertension, SVT: has been stable.  Sees cardiology regularly.  Is currently on a baby aspirin, furosemide 40 mg daily, metoprolol 25 mg daily, potassium 10 mEq twice a day.  Denies any side effects.  Denies any chest pain, shortness of breath, lightheadedness, dizziness, leg swelling.     4.  Dyslipidemia: has been stable.  Is currently on simvastatin 40 mg daily.  Denies any side effects.     5.  GERD:  has been stable on omeprazole BID.     6.  Depression: Has a history of recurrent depression, is well controlled currently on the Lexapro 10 mg daily.  Denies any side effects.  Denies any suicidal thoughts     7.  Alzheimer's: Has had memory changes, possible Alzheimer's.  She has noted significant improvement in her memory with the memantine.  She also takes zonisamide as her neurologist was concerned for possible seizures.       8.  Prediabetes: Has been stable, watches her diet carefully.  Her last A1c was 5.9% 9/2020        Current Outpatient Medications:   •  omeprazole (PRILOSEC) 20 MG delayed-release capsule, Take 1 Capsule by mouth 2 times a day., Disp: 180 Capsule, Rfl: 1  •  Memantine HCl ER (NAMENDA) 28 MG CAPSULE SR 24 HR, Take 1 Capsule by mouth every day., Disp: 90 Capsule, Rfl: 1  •  zonisamide (ZONEGRAN) 100 MG Cap, Take 1 Capsule by mouth every day., Disp: 90 Capsule, Rfl: 0  •  potassium chloride ER (KLOR-CON) 10 MEQ tablet, Take 1 Tablet by mouth 2 times a day., Disp: 180 Tablet, Rfl: 0  •  levothyroxine  "(SYNTHROID) 88 MCG Tab, Take 0.5 Tablets by mouth every morning on an empty stomach., Disp: 45 Tablet, Rfl: 0  •  simvastatin (ZOCOR) 40 MG Tab, Take 1 Tablet by mouth every evening. Due for appointment, Disp: 100 Tablet, Rfl: 0  •  escitalopram (LEXAPRO) 10 MG Tab, Take 1 Tablet by mouth every day., Disp: 100 Tablet, Rfl: 0  •  furosemide (LASIX) 40 MG Tab, Take 1 Tablet by mouth every day., Disp: 100 Tablet, Rfl: 0  •  metoprolol SR (TOPROL XL) 25 MG TABLET SR 24 HR, Take 1 tablet by mouth every day., Disp: 100 tablet, Rfl: 3  •  pyridoxine (VITAMIN B-6) 50 MG Tab, Take 100 mg by mouth every day., Disp: , Rfl:   •  aspirin 81 MG EC tablet, Take 81 mg by mouth every evening., Disp: , Rfl:   •  Cyanocobalamin (VITAMIN B-12) 1000 MCG Tab, Take 1,000 mcg by mouth every day., Disp: , Rfl:   •  VITAMIN D, CHOLECALCIFEROL, PO, Take 2,000 Units by mouth every day., Disp: , Rfl:     Objective:     Vitals: /70   Pulse 64   Temp 36.4 °C (97.6 °F)   Resp 16   Ht 1.549 m (5' 1\")   Wt 72.6 kg (160 lb)   SpO2 96%   BMI 30.23 kg/m²   General: Alert  HEENT: Normocephalic.   Skin: Slightly erythematous, dry, flaky macule measuring approx 1 cm on her right cheek  Heart: Regular rate and rhythm.  S1 and S2 normal.  No murmurs appreciated.  Respiratory: Normal respiratory effort.  Clear to auscultation bilaterally.  Abdomen: Non-distended, soft  Extremities: No leg edema.    CRYOTHERAPY:  Discussed risks and benefits of cryotherapy. Patient verbally agreed. 3 applications of cryotherapy were applied to the right cheek lesion. Patient tolerated procedure well. Aftercare instructions given.      Assessment/Plan:     Jill was seen today for follow-up.    Diagnoses and all orders for this visit:    Actinic keratosis  Cryotherapy applied today.  She will let us know if the lesion returns, will refer to dermatology next    Acquired hypothyroidism  Chronic, stable, continue levothyroxine  -     TSH; Future    SVT " (supraventricular tachycardia) (HCC)  Essential hypertension  Chronic, stable, continue current medications  -     CBC WITHOUT DIFFERENTIAL; Future  -     Comp Metabolic Panel; Future    Dyslipidemia  Chronic, stable, continue simvastatin  -     Comp Metabolic Panel; Future    Atherosclerosis of aorta (HCC)  Chronic, stable, continue to monitor    Gastroesophageal reflux disease without esophagitis  Chronic, stable, continue omeprazole  -     omeprazole (PRILOSEC) 20 MG delayed-release capsule; Take 1 Capsule by mouth 2 times a day.    Recurrent major depressive disorder, in full remission (HCC)  Chronic, stable, continue Lexapro    Late onset Alzheimer's disease without behavioral disturbance (HCC)  Cerebral atrophy (HCC)  Chronic, stable, continue current medications  -     CBC WITHOUT DIFFERENTIAL; Future  -     Comp Metabolic Panel; Future    Stage 3 chronic kidney disease, unspecified whether stage 3a or 3b CKD (HCC)  Chronic, stable, continue to monitor  -     Comp Metabolic Panel; Future    IGT (impaired glucose tolerance)  Chronic, stable, continue to monitor  -     HEMOGLOBIN A1C; Future    Obesity (BMI 30-39.9)  -     Patient identified as having weight management issue.  Appropriate orders and counseling given.        Return in about 3 months (around 4/10/2022) for routine follow up.        Annual Health Assessment Questions:     1.  Are you currently engaging in any exercise or physical activity? No     2.  How would you describe your mood or emotional well-being today? fair     3.  Have you had any falls in the last year? No     4.  Have you noticed any problems with your balance or had difficulty walking? No     5.  In the last six months have you experienced any leakage of urine? No     6. DPA/Advanced Directive: Patient does not have an Advanced Directive.  A packet and workshop information was given on Advanced Directives.

## 2022-01-10 NOTE — TELEPHONE ENCOUNTER
ESTABLISHED PATIENT PRE-VISIT PLANNING     Annual Wellness Visit Over Due      Patient was NOT contacted to complete PVP.     Note: Patient will not be contacted if there is no indication to call.     1.  Reviewed notes from the last few office visits within the medical group: Yes    2.  If any orders were placed at last visit or intended to be done for this visit (i.e. 6 mos follow-up), do we have Results/Consult Notes?         •  Labs - Labs ordered, completed on 04/26/2021 and results are in chart. Last office visit with PCP Provider , was on 11/09/2020.    Note: If patient appointment is for lab review and patient did not complete labs, check with provider if OK to reschedule patient until labs completed.       •  Imaging - Imaging was not ordered at last office visit.       •  Referrals - No referrals were ordered at last office visit.    3. Is this appointment scheduled as a Hospital Follow-Up? No    4.  Immunizations were updated in Epic using Reconcile Outside Information activity? Yes    5.  Patient is due for the following Health Maintenance Topics:   Health Maintenance Due   Topic Date Due   • IMM INFLUENZA (1) 09/01/2021   • COVID-19 Vaccine (2 - Pfizer 3-dose series) 09/03/2021   • Annual Wellness Visit  09/12/2021       6.  AHA (Pulse8) form printed for Provider? Yes

## 2022-01-10 NOTE — PROGRESS NOTES
Annual Health Assessment Questions:    1.  Are you currently engaging in any exercise or physical activity? No    2.  How would you describe your mood or emotional well-being today? fair    3.  Have you had any falls in the last year? No    4.  Have you noticed any problems with your balance or had difficulty walking? No    5.  In the last six months have you experienced any leakage of urine? No    6. DPA/Advanced Directive: Patient does not have an Advanced Directive.  A packet and workshop information was given on Advanced Directives.

## 2022-01-19 ENCOUNTER — PATIENT MESSAGE (OUTPATIENT)
Dept: HEALTH INFORMATION MANAGEMENT | Facility: OTHER | Age: 82
End: 2022-01-19
Payer: MEDICARE

## 2022-01-28 RX ORDER — ESCITALOPRAM OXALATE 10 MG/1
10 TABLET ORAL DAILY
Qty: 100 TABLET | Refills: 1 | Status: SHIPPED | OUTPATIENT
Start: 2022-01-28 | End: 2022-07-24 | Stop reason: SDUPTHER

## 2022-02-03 DIAGNOSIS — E78.5 DYSLIPIDEMIA: ICD-10-CM

## 2022-02-03 RX ORDER — SIMVASTATIN 40 MG
40 TABLET ORAL EVERY EVENING
Qty: 100 TABLET | Refills: 3 | Status: SHIPPED | OUTPATIENT
Start: 2022-02-03 | End: 2023-02-06

## 2022-02-07 DIAGNOSIS — I10 ESSENTIAL HYPERTENSION: ICD-10-CM

## 2022-02-08 RX ORDER — FUROSEMIDE 40 MG/1
40 TABLET ORAL DAILY
Qty: 100 TABLET | Refills: 1 | Status: SHIPPED | OUTPATIENT
Start: 2022-02-08 | End: 2022-08-10

## 2022-02-08 RX ORDER — METOPROLOL SUCCINATE 25 MG/1
25 TABLET, EXTENDED RELEASE ORAL DAILY
Qty: 100 TABLET | Refills: 1 | Status: SHIPPED | OUTPATIENT
Start: 2022-02-08 | End: 2022-08-10

## 2022-02-17 DIAGNOSIS — E03.9 ACQUIRED HYPOTHYROIDISM: ICD-10-CM

## 2022-02-18 RX ORDER — LEVOTHYROXINE SODIUM 88 UG/1
44 TABLET ORAL
Qty: 45 TABLET | Refills: 3 | Status: SHIPPED | OUTPATIENT
Start: 2022-02-18 | End: 2023-02-06

## 2022-02-28 RX ORDER — POTASSIUM CHLORIDE 750 MG/1
10 TABLET, FILM COATED, EXTENDED RELEASE ORAL 2 TIMES DAILY
Qty: 180 TABLET | Refills: 3 | Status: SHIPPED | OUTPATIENT
Start: 2022-02-28 | End: 2023-02-24

## 2022-03-31 RX ORDER — MEMANTINE HYDROCHLORIDE 28 MG/1
28 CAPSULE, EXTENDED RELEASE ORAL DAILY
Qty: 90 CAPSULE | Refills: 2 | Status: SHIPPED | OUTPATIENT
Start: 2022-03-31 | End: 2023-01-03

## 2022-03-31 RX ORDER — ZONISAMIDE 100 MG/1
100 CAPSULE ORAL DAILY
Qty: 90 CAPSULE | Refills: 2 | OUTPATIENT
Start: 2022-03-31

## 2022-04-06 ENCOUNTER — TELEPHONE (OUTPATIENT)
Dept: MEDICAL GROUP | Facility: MEDICAL CENTER | Age: 82
End: 2022-04-06
Payer: MEDICARE

## 2022-04-06 NOTE — TELEPHONE ENCOUNTER
ESTABLISHED PATIENT PRE-VISIT PLANNING     Annual Wellness Visit Post-poned till 01/10/2027 per Health Maintenance    Patient was NOT contacted to complete PVP.     Note: Patient will not be contacted if there is no indication to call.     1.  Reviewed notes from the last few office visits within the medical group: Yes    2.  If any orders were placed at last visit or intended to be done for this visit (i.e. 6 mos follow-up), do we have Results/Consult Notes?         •  Labs - Labs ordered, completed on 01/10/2022 and results are in chart.  Note: If patient appointment is for lab review and patient did not complete labs, check with provider if OK to reschedule patient until labs completed.       •  Imaging - Imaging was not ordered at last office visit.          •  Referrals - No referrals were ordered at last office visit.    3. Is this appointment scheduled as a Hospital Follow-Up? No    4.  Immunizations were updated in Epic using Reconcile Outside Information activity? Yes    5.  Patient is due for the following Health Maintenance Topics:   Health Maintenance Due   Topic Date Due   • COVID-19 Vaccine (2 - Pfizer 3-dose series) 09/03/2021       6.  AHA (Pulse8) form printed for Provider? N/A, Compliant

## 2022-04-07 ENCOUNTER — OFFICE VISIT (OUTPATIENT)
Dept: MEDICAL GROUP | Facility: MEDICAL CENTER | Age: 82
End: 2022-04-07
Payer: MEDICARE

## 2022-04-07 VITALS
RESPIRATION RATE: 16 BRPM | DIASTOLIC BLOOD PRESSURE: 80 MMHG | HEART RATE: 100 BPM | OXYGEN SATURATION: 91 % | TEMPERATURE: 97.7 F | WEIGHT: 155.42 LBS | BODY MASS INDEX: 29.34 KG/M2 | HEIGHT: 61 IN | SYSTOLIC BLOOD PRESSURE: 132 MMHG

## 2022-04-07 DIAGNOSIS — F02.80 LATE ONSET ALZHEIMER'S DISEASE WITHOUT BEHAVIORAL DISTURBANCE (HCC): ICD-10-CM

## 2022-04-07 DIAGNOSIS — I10 ESSENTIAL HYPERTENSION: ICD-10-CM

## 2022-04-07 DIAGNOSIS — N18.30 STAGE 3 CHRONIC KIDNEY DISEASE, UNSPECIFIED WHETHER STAGE 3A OR 3B CKD: ICD-10-CM

## 2022-04-07 DIAGNOSIS — R41.3 SPELL OF MEMORY LOSS: ICD-10-CM

## 2022-04-07 DIAGNOSIS — E78.5 DYSLIPIDEMIA: ICD-10-CM

## 2022-04-07 DIAGNOSIS — G30.1 LATE ONSET ALZHEIMER'S DISEASE WITHOUT BEHAVIORAL DISTURBANCE (HCC): ICD-10-CM

## 2022-04-07 DIAGNOSIS — R73.02 IGT (IMPAIRED GLUCOSE TOLERANCE): ICD-10-CM

## 2022-04-07 DIAGNOSIS — G31.9 CEREBRAL ATROPHY (HCC): ICD-10-CM

## 2022-04-07 PROBLEM — E66.9 OBESITY (BMI 30-39.9): Status: RESOLVED | Noted: 2022-01-10 | Resolved: 2022-04-07

## 2022-04-07 PROCEDURE — 99214 OFFICE O/P EST MOD 30 MIN: CPT | Performed by: FAMILY MEDICINE

## 2022-04-07 RX ORDER — ZONISAMIDE 100 MG/1
100 CAPSULE ORAL DAILY
Qty: 90 CAPSULE | Refills: 0 | Status: SHIPPED | OUTPATIENT
Start: 2022-04-07 | End: 2022-04-07 | Stop reason: SDUPTHER

## 2022-04-07 RX ORDER — ZONISAMIDE 100 MG/1
100 CAPSULE ORAL DAILY
Qty: 90 CAPSULE | Refills: 3 | Status: SHIPPED | OUTPATIENT
Start: 2022-04-07 | End: 2022-05-24

## 2022-04-07 ASSESSMENT — FIBROSIS 4 INDEX: FIB4 SCORE: 1.94

## 2022-04-07 NOTE — PROGRESS NOTES
"  Subjective:     Jill Diaz is a 81 y.o. female presenting for a follow-up.  She has been eating healthier since her last visit.  Has been able to lose about 5 pounds.  Her A1c had increased to 6.5%.        Current Outpatient Medications:   •  zonisamide (ZONEGRAN) 100 MG Cap, Take 1 Capsule by mouth every day., Disp: 90 Capsule, Rfl: 3  •  Memantine HCl ER (NAMENDA) 28 MG CAPSULE SR 24 HR, Take 1 Capsule by mouth every day., Disp: 90 Capsule, Rfl: 2  •  potassium chloride ER (KLOR-CON) 10 MEQ tablet, Take 1 Tablet by mouth 2 times a day., Disp: 180 Tablet, Rfl: 3  •  levothyroxine (SYNTHROID) 88 MCG Tab, Take 0.5 Tablets by mouth every morning on an empty stomach., Disp: 45 Tablet, Rfl: 3  •  metoprolol SR (TOPROL XL) 25 MG TABLET SR 24 HR, Take 1 Tablet by mouth every day., Disp: 100 Tablet, Rfl: 1  •  furosemide (LASIX) 40 MG Tab, Take 1 Tablet by mouth every day., Disp: 100 Tablet, Rfl: 1  •  simvastatin (ZOCOR) 40 MG Tab, Take 1 Tablet by mouth every evening., Disp: 100 Tablet, Rfl: 3  •  escitalopram (LEXAPRO) 10 MG Tab, Take 1 Tablet by mouth every day., Disp: 100 Tablet, Rfl: 1  •  omeprazole (PRILOSEC) 20 MG delayed-release capsule, Take 1 Capsule by mouth 2 times a day., Disp: 180 Capsule, Rfl: 1  •  pyridoxine (VITAMIN B-6) 50 MG Tab, Take 100 mg by mouth every day., Disp: , Rfl:   •  aspirin 81 MG EC tablet, Take 81 mg by mouth every evening., Disp: , Rfl:   •  Cyanocobalamin (VITAMIN B-12) 1000 MCG Tab, Take 1,000 mcg by mouth every day., Disp: , Rfl:   •  VITAMIN D, CHOLECALCIFEROL, PO, Take 2,000 Units by mouth every day., Disp: , Rfl:     Objective:     Vitals: /80 (BP Location: Right arm, Patient Position: Sitting, BP Cuff Size: Adult)   Pulse 100   Temp 36.5 °C (97.7 °F) (Temporal)   Resp 16   Ht 1.549 m (5' 1\")   Wt 70.5 kg (155 lb 6.8 oz)   SpO2 91%   BMI 29.37 kg/m²   General: Alert  HEENT: Normocephalic.   Heart: Regular rate and rhythm.  S1 and S2 normal.  No murmurs " appreciated.  Respiratory: Normal respiratory effort.  Clear to auscultation bilaterally.  Abdomen: Non-distended, soft  Extremities: No leg edema.    Assessment/Plan:     Diagnoses and all orders for this visit:    IGT (impaired glucose tolerance)  Chronic, possibly stable.  We will recheck in a couple weeks.  We discussed a healthy diet, regular exercise.  -     Basic Metabolic Panel; Future  -     HEMOGLOBIN A1C; Future    Essential hypertension  Chronic, stable, continue current medications  -     Basic Metabolic Panel; Future    Dyslipidemia  Chronic, stable, continue simvastatin  -     Basic Metabolic Panel; Future    Stage 3 chronic kidney disease, unspecified whether stage 3a or 3b CKD (HCC)  Chronic, stable, continue to monitor  -     Basic Metabolic Panel; Future    Late onset Alzheimer's disease without behavioral disturbance (HCC)  Cerebral atrophy (HCC)  Spell of memory loss  Chronic, stable.  She declines to go back to neurology.  We will continue to prescribe her medications  -     zonisamide (ZONEGRAN) 100 MG Cap; Take 1 Capsule by mouth every day.          Return in about 4 months (around 8/7/2022) for routine follow up.

## 2022-05-23 ENCOUNTER — TELEPHONE (OUTPATIENT)
Dept: HEALTH INFORMATION MANAGEMENT | Facility: OTHER | Age: 82
End: 2022-05-23
Payer: MEDICARE

## 2022-05-23 NOTE — TELEPHONE ENCOUNTER
Called to schedule COMPREHENSIVE HEALTH ASSESSMENT  Member answered and I stated that I was calling regarding a Preventive Screening. Member stated that she is moving to [place] and will call back to update her address w [facility] & will also update [employer]. Advise she will be auto disenrolled when she signs up for a new  advantage plan   closing encounter as Declined out of area

## 2022-05-24 RX ORDER — ZONISAMIDE 100 MG/1
100 CAPSULE ORAL DAILY
Qty: 90 CAPSULE | Refills: 3 | Status: SHIPPED | OUTPATIENT
Start: 2022-05-24 | End: 2022-08-25 | Stop reason: SDUPTHER

## 2022-07-24 DIAGNOSIS — K21.9 GASTROESOPHAGEAL REFLUX DISEASE WITHOUT ESOPHAGITIS: ICD-10-CM

## 2022-07-24 RX ORDER — ESCITALOPRAM OXALATE 10 MG/1
10 TABLET ORAL DAILY
Qty: 100 TABLET | Refills: 3 | Status: SHIPPED | OUTPATIENT
Start: 2022-07-24

## 2022-07-24 RX ORDER — OMEPRAZOLE 20 MG/1
20 CAPSULE, DELAYED RELEASE ORAL 2 TIMES DAILY
Qty: 180 CAPSULE | Refills: 3 | Status: SHIPPED | OUTPATIENT
Start: 2022-07-24

## 2022-08-10 DIAGNOSIS — I10 ESSENTIAL HYPERTENSION: ICD-10-CM

## 2022-08-10 RX ORDER — FUROSEMIDE 40 MG/1
40 TABLET ORAL DAILY
Qty: 100 TABLET | Refills: 1 | Status: SHIPPED | OUTPATIENT
Start: 2022-08-10 | End: 2023-02-06

## 2022-08-10 RX ORDER — METOPROLOL SUCCINATE 25 MG/1
25 TABLET, EXTENDED RELEASE ORAL DAILY
Qty: 100 TABLET | Refills: 1 | Status: SHIPPED | OUTPATIENT
Start: 2022-08-10 | End: 2023-02-06

## 2023-01-03 RX ORDER — MEMANTINE HYDROCHLORIDE 28 MG/1
28 CAPSULE, EXTENDED RELEASE ORAL DAILY
Qty: 90 CAPSULE | Refills: 0 | Status: SHIPPED | OUTPATIENT
Start: 2023-01-03

## 2023-02-05 DIAGNOSIS — E78.5 DYSLIPIDEMIA: ICD-10-CM

## 2023-02-05 DIAGNOSIS — I10 ESSENTIAL HYPERTENSION: ICD-10-CM

## 2023-02-05 DIAGNOSIS — E03.9 ACQUIRED HYPOTHYROIDISM: ICD-10-CM

## 2023-02-06 RX ORDER — SIMVASTATIN 40 MG
40 TABLET ORAL EVERY EVENING
Qty: 90 TABLET | Refills: 0 | Status: SHIPPED | OUTPATIENT
Start: 2023-02-06

## 2023-02-06 RX ORDER — METOPROLOL SUCCINATE 25 MG/1
25 TABLET, EXTENDED RELEASE ORAL DAILY
Qty: 90 TABLET | Refills: 0 | Status: SHIPPED | OUTPATIENT
Start: 2023-02-06

## 2023-02-06 RX ORDER — LEVOTHYROXINE SODIUM 88 UG/1
44 TABLET ORAL
Qty: 45 TABLET | Refills: 0 | Status: SHIPPED | OUTPATIENT
Start: 2023-02-06

## 2023-02-06 RX ORDER — FUROSEMIDE 40 MG/1
40 TABLET ORAL DAILY
Qty: 90 TABLET | Refills: 0 | Status: SHIPPED | OUTPATIENT
Start: 2023-02-06

## 2023-02-24 RX ORDER — POTASSIUM CHLORIDE 750 MG/1
10 TABLET, FILM COATED, EXTENDED RELEASE ORAL 2 TIMES DAILY
Qty: 180 TABLET | Refills: 0 | Status: SHIPPED | OUTPATIENT
Start: 2023-02-24

## 2023-10-12 ENCOUNTER — DOCUMENTATION (OUTPATIENT)
Dept: HEALTH INFORMATION MANAGEMENT | Facility: OTHER | Age: 83
End: 2023-10-12

## (undated) DEVICE — ELECTRODE 850 FOAM ADHESIVE - HYDROGEL RADIOTRNSPRNT (50/PK)

## (undated) DEVICE — Device

## (undated) DEVICE — CHLORAPREP 26 ML APPLICATOR - ORANGE TINT(25/CA)

## (undated) DEVICE — DRESSING POST OP BORDER 4 X 10 (5EA/BX)

## (undated) DEVICE — TIP INTPLS HFLO ML ORFC BTRY - (12/CS)  FOR SURGILAV

## (undated) DEVICE — GLOVE BIOGEL INDICATOR SZ 8 SURGICAL PF LTX - (50/BX 4BX/CA)

## (undated) DEVICE — CANISTER SUCTION RIGID RED 1500CC (40EA/CA)

## (undated) DEVICE — SODIUM CHL. IRRIGATION 0.9% 3000ML (4EA/CA 65CA/PF)

## (undated) DEVICE — TOURNIQUET, STERILE 24 (YELLOW)

## (undated) DEVICE — SENSOR SPO2 NEO LNCS ADHESIVE (20/BX) SEE USER NOTES

## (undated) DEVICE — SUTURE 2-0 VICRYL PLUS CT-1 - 8 X 18 INCH(12/BX)

## (undated) DEVICE — GOWN WARMING STANDARD FLEX - (30/CA)

## (undated) DEVICE — MASK, LARYNGEAL AIRWAY #4

## (undated) DEVICE — GLOVE BIOGEL INDICATOR SZ 7.5 SURGICAL PF LTX - (50PR/BX 4BX/CA)

## (undated) DEVICE — HEAD HOLDER JUNIOR/ADULT

## (undated) DEVICE — GLOVE BIOGEL SZ 8 SURGICAL PF LTX - (50PR/BX 4BX/CA)

## (undated) DEVICE — BLOCK

## (undated) DEVICE — BLADE SAGITTAL 34MM

## (undated) DEVICE — KIT ANESTHESIA W/CIRCUIT & 3/LT BAG W/FILTER (20EA/CA)

## (undated) DEVICE — STOCKINETTE IMPERVIOUS 12X48 - STERILELF (10/CA)"

## (undated) DEVICE — PAD UNIVERSAL MULTI USE (1/EA)

## (undated) DEVICE — GLOVE BIOGEL SZ 7.5 SURGICAL PF LTX - (50PR/BX 4BX/CA)

## (undated) DEVICE — SODIUM CHL IRRIGATION 0.9% 1000ML (12EA/CA)

## (undated) DEVICE — HUMID-VENT HEAT AND MOISTURE EXCHANGE- (50/BX)

## (undated) DEVICE — BLADE SAGITTAL SYSTEM 18MM

## (undated) DEVICE — PACK TOTAL KNEE  (1/CA)

## (undated) DEVICE — KIT ROOM DECONTAMINATION

## (undated) DEVICE — NEPTUNE 4 PORT MANIFOLD - (20/PK)

## (undated) DEVICE — ELECTRODE DUAL RETURN W/ CORD - (50/PK)

## (undated) DEVICE — DRESSING AQUACEL AG ADVANTAGE 3.5 X 10" (10EA/BX)"

## (undated) DEVICE — PADDING CAST 6 IN STERILE - 6 X 4 YDS (24/CA)

## (undated) DEVICE — MASK ANESTHESIA ADULT  - (100/CA)

## (undated) DEVICE — LENS/HOOD FOR SPACESUIT - (32/PK) PEEL AWAY FACE

## (undated) DEVICE — TOURNIQUET CUFF 34 X 4 ONE PORT DISP - STERILE (10/BX)

## (undated) DEVICE — GLOVE, LITE (PAIR)

## (undated) DEVICE — SUTURE GENERAL

## (undated) DEVICE — TUBE CONNECTING SUCTION - CLEAR PLASTIC STERILE 72 IN (50EA/CA)

## (undated) DEVICE — MIXER BONE CEMENT REVOLUTION - W/FEMORAL PRESSURIZER (6/CA)

## (undated) DEVICE — SUTURE 3-0 MONOCRYL PLUS PS-1 - 27 INCH (36/BX)

## (undated) DEVICE — BAG, SPONGE COUNT 50600

## (undated) DEVICE — SUCTION INSTRUMENT YANKAUER BULBOUS TIP W/O VENT (50EA/CA)

## (undated) DEVICE — HANDPIECE 10FT INTPLS SCT PLS IRRIGATION HAND CONTROL SET (6/PK)

## (undated) DEVICE — PROTECTOR ULNA NERVE - (36PR/CA)

## (undated) DEVICE — SUTURE 1 VICRYL PLUS CTX - 8 X 18 INCH (12/BX)

## (undated) DEVICE — BANDAGE ELASTIC 6 IN X 5 YDS - LATEX FREE (10/BX)